# Patient Record
Sex: MALE | Race: BLACK OR AFRICAN AMERICAN | NOT HISPANIC OR LATINO | Employment: OTHER | URBAN - METROPOLITAN AREA
[De-identification: names, ages, dates, MRNs, and addresses within clinical notes are randomized per-mention and may not be internally consistent; named-entity substitution may affect disease eponyms.]

---

## 2018-10-29 ENCOUNTER — HISTORICAL (OUTPATIENT)
Dept: ADMINISTRATIVE | Facility: HOSPITAL | Age: 63
End: 2018-10-29

## 2021-09-19 PROBLEM — I10 ESSENTIAL HYPERTENSION: Status: ACTIVE | Noted: 2021-09-19

## 2021-09-19 PROBLEM — E87.6 HYPOKALEMIA: Status: ACTIVE | Noted: 2021-09-19

## 2021-09-19 PROBLEM — A49.8 CLOSTRIDIUM DIFFICILE INFECTION: Status: ACTIVE | Noted: 2021-09-19

## 2021-09-19 PROBLEM — E78.5 HYPERLIPIDEMIA: Status: ACTIVE | Noted: 2021-09-19

## 2021-09-19 PROBLEM — D64.9 NORMOCYTIC ANEMIA: Status: ACTIVE | Noted: 2021-09-19

## 2021-09-19 PROBLEM — Z86.73 HISTORY OF CVA (CEREBROVASCULAR ACCIDENT): Status: ACTIVE | Noted: 2021-09-19

## 2021-09-20 PROBLEM — N40.0 BENIGN PROSTATIC HYPERPLASIA WITHOUT LOWER URINARY TRACT SYMPTOMS: Status: ACTIVE | Noted: 2021-09-20

## 2024-01-01 ENCOUNTER — HOSPITAL ENCOUNTER (INPATIENT)
Facility: HOSPITAL | Age: 69
LOS: 10 days | DRG: 326 | End: 2024-12-03
Attending: INTERNAL MEDICINE | Admitting: INTERNAL MEDICINE
Payer: MEDICARE

## 2024-01-01 ENCOUNTER — TELEPHONE (OUTPATIENT)
Dept: ENDOSCOPY | Facility: HOSPITAL | Age: 69
End: 2024-01-01
Payer: MEDICARE

## 2024-01-01 VITALS
RESPIRATION RATE: 19 BRPM | HEIGHT: 66 IN | BODY MASS INDEX: 27.49 KG/M2 | WEIGHT: 171.06 LBS | OXYGEN SATURATION: 44 % | DIASTOLIC BLOOD PRESSURE: 65 MMHG | SYSTOLIC BLOOD PRESSURE: 117 MMHG | TEMPERATURE: 98 F

## 2024-01-01 DIAGNOSIS — R00.1 BRADYCARDIA: ICD-10-CM

## 2024-01-01 DIAGNOSIS — R52 PAIN: ICD-10-CM

## 2024-01-01 DIAGNOSIS — Z71.89 ADVANCE CARE PLANNING: ICD-10-CM

## 2024-01-01 DIAGNOSIS — I50.9 HEART FAILURE: ICD-10-CM

## 2024-01-01 DIAGNOSIS — R07.9 CHEST PAIN: ICD-10-CM

## 2024-01-01 DIAGNOSIS — Z51.5 PALLIATIVE CARE ENCOUNTER: ICD-10-CM

## 2024-01-01 DIAGNOSIS — R19.5 LOOSE STOOLS: ICD-10-CM

## 2024-01-01 DIAGNOSIS — Z71.89 GOALS OF CARE, COUNSELING/DISCUSSION: ICD-10-CM

## 2024-01-01 DIAGNOSIS — K22.3 ESOPHAGEAL PERFORATION: Primary | ICD-10-CM

## 2024-01-01 DIAGNOSIS — J96.01 ACUTE HYPOXEMIC RESPIRATORY FAILURE: ICD-10-CM

## 2024-01-01 DIAGNOSIS — R79.89 ELEVATED TROPONIN: ICD-10-CM

## 2024-01-01 LAB
ABO + RH BLD: NORMAL
ALBUMIN SERPL BCP-MCNC: 1 G/DL (ref 3.5–5.2)
ALBUMIN SERPL BCP-MCNC: 1.1 G/DL (ref 3.5–5.2)
ALBUMIN SERPL BCP-MCNC: 1.3 G/DL (ref 3.5–5.2)
ALBUMIN SERPL BCP-MCNC: 1.5 G/DL (ref 3.5–5.2)
ALBUMIN SERPL BCP-MCNC: 1.5 G/DL (ref 3.5–5.2)
ALBUMIN SERPL BCP-MCNC: 1.6 G/DL (ref 3.5–5.2)
ALBUMIN SERPL BCP-MCNC: 1.6 G/DL (ref 3.5–5.2)
ALBUMIN SERPL BCP-MCNC: 1.7 G/DL (ref 3.5–5.2)
ALBUMIN SERPL BCP-MCNC: 1.9 G/DL (ref 3.5–5.2)
ALBUMIN SERPL BCP-MCNC: 2.1 G/DL (ref 3.5–5.2)
ALLENS TEST: ABNORMAL
ALP SERPL-CCNC: 137 U/L (ref 40–150)
ALP SERPL-CCNC: 145 U/L (ref 40–150)
ALP SERPL-CCNC: 146 U/L (ref 40–150)
ALP SERPL-CCNC: 148 U/L (ref 40–150)
ALP SERPL-CCNC: 149 U/L (ref 40–150)
ALP SERPL-CCNC: 153 U/L (ref 40–150)
ALP SERPL-CCNC: 156 U/L (ref 40–150)
ALP SERPL-CCNC: 162 U/L (ref 40–150)
ALP SERPL-CCNC: 169 U/L (ref 40–150)
ALP SERPL-CCNC: 204 U/L (ref 40–150)
ALT SERPL W/O P-5'-P-CCNC: 20 U/L (ref 10–44)
ALT SERPL W/O P-5'-P-CCNC: 23 U/L (ref 10–44)
ALT SERPL W/O P-5'-P-CCNC: 26 U/L (ref 10–44)
ALT SERPL W/O P-5'-P-CCNC: 28 U/L (ref 10–44)
ALT SERPL W/O P-5'-P-CCNC: 28 U/L (ref 10–44)
ALT SERPL W/O P-5'-P-CCNC: 29 U/L (ref 10–44)
ALT SERPL W/O P-5'-P-CCNC: 29 U/L (ref 10–44)
ALT SERPL W/O P-5'-P-CCNC: 37 U/L (ref 10–44)
ALT SERPL W/O P-5'-P-CCNC: 46 U/L (ref 10–44)
ALT SERPL W/O P-5'-P-CCNC: 55 U/L (ref 10–44)
ALT SERPL W/O P-5'-P-CCNC: 66 U/L (ref 10–44)
ANION GAP SERPL CALC-SCNC: 10 MMOL/L (ref 8–16)
ANION GAP SERPL CALC-SCNC: 11 MMOL/L (ref 8–16)
ANION GAP SERPL CALC-SCNC: 12 MMOL/L (ref 8–16)
ANION GAP SERPL CALC-SCNC: 13 MMOL/L (ref 8–16)
ANION GAP SERPL CALC-SCNC: 13 MMOL/L (ref 8–16)
ANION GAP SERPL CALC-SCNC: 14 MMOL/L (ref 8–16)
ANION GAP SERPL CALC-SCNC: 15 MMOL/L (ref 8–16)
ANION GAP SERPL CALC-SCNC: 18 MMOL/L (ref 8–16)
ANION GAP SERPL CALC-SCNC: 5 MMOL/L (ref 8–16)
ANION GAP SERPL CALC-SCNC: 7 MMOL/L (ref 8–16)
ANION GAP SERPL CALC-SCNC: 8 MMOL/L (ref 8–16)
ANION GAP SERPL CALC-SCNC: 9 MMOL/L (ref 8–16)
ANION GAP SERPL CALC-SCNC: 9 MMOL/L (ref 8–16)
ANISOCYTOSIS BLD QL SMEAR: ABNORMAL
ANISOCYTOSIS BLD QL SMEAR: SLIGHT
APPEARANCE FLD: NORMAL
APTT PPP: 51.5 SEC (ref 21–32)
ASCENDING AORTA: 3.31 CM
AST SERPL-CCNC: 106 U/L (ref 10–40)
AST SERPL-CCNC: 146 U/L (ref 10–40)
AST SERPL-CCNC: 171 U/L (ref 10–40)
AST SERPL-CCNC: 60 U/L (ref 10–40)
AST SERPL-CCNC: 61 U/L (ref 10–40)
AST SERPL-CCNC: 61 U/L (ref 10–40)
AST SERPL-CCNC: 62 U/L (ref 10–40)
AST SERPL-CCNC: 68 U/L (ref 10–40)
AST SERPL-CCNC: 71 U/L (ref 10–40)
AST SERPL-CCNC: 71 U/L (ref 10–40)
AST SERPL-CCNC: 79 U/L (ref 10–40)
AST SERPL-CCNC: 80 U/L (ref 10–40)
AST SERPL-CCNC: 82 U/L (ref 10–40)
AV AREA BY CONTINUOUS VTI: 2 CM2
AV INDEX (PROSTH): 0.63
AV LVOT MEAN GRADIENT: 2 MMHG
AV LVOT PEAK GRADIENT: 3 MMHG
AV MEAN GRADIENT: 5.9 MMHG
AV PEAK GRADIENT: 7.8 MMHG
AV VALVE AREA BY VELOCITY RATIO: 1.8 CM²
AV VALVE AREA: 2 CM2
AV VELOCITY RATIO: 0.57
BACTERIA BLD CULT: NORMAL
BACTERIA BLD CULT: NORMAL
BACTERIA SPEC ANAEROBE CULT: NORMAL
BASO STIPL BLD QL SMEAR: ABNORMAL
BASOPHILS # BLD AUTO: 0.02 K/UL (ref 0–0.2)
BASOPHILS # BLD AUTO: 0.03 K/UL (ref 0–0.2)
BASOPHILS # BLD AUTO: 0.05 K/UL (ref 0–0.2)
BASOPHILS # BLD AUTO: 0.05 K/UL (ref 0–0.2)
BASOPHILS # BLD AUTO: 0.06 K/UL (ref 0–0.2)
BASOPHILS # BLD AUTO: 0.06 K/UL (ref 0–0.2)
BASOPHILS # BLD AUTO: 0.07 K/UL (ref 0–0.2)
BASOPHILS # BLD AUTO: 0.08 K/UL (ref 0–0.2)
BASOPHILS # BLD AUTO: 0.08 K/UL (ref 0–0.2)
BASOPHILS # BLD AUTO: 0.09 K/UL (ref 0–0.2)
BASOPHILS # BLD AUTO: 0.13 K/UL (ref 0–0.2)
BASOPHILS # BLD AUTO: 0.13 K/UL (ref 0–0.2)
BASOPHILS # BLD AUTO: 0.15 K/UL (ref 0–0.2)
BASOPHILS # BLD AUTO: ABNORMAL K/UL (ref 0–0.2)
BASOPHILS NFR BLD: 0 % (ref 0–1.9)
BASOPHILS NFR BLD: 0.1 % (ref 0–1.9)
BASOPHILS NFR BLD: 0.1 % (ref 0–1.9)
BASOPHILS NFR BLD: 0.2 % (ref 0–1.9)
BASOPHILS NFR BLD: 0.3 % (ref 0–1.9)
BASOPHILS NFR BLD: 0.4 % (ref 0–1.9)
BASOPHILS NFR BLD: 0.4 % (ref 0–1.9)
BASOPHILS NFR BLD: 0.5 % (ref 0–1.9)
BASOPHILS NFR BLD: 0.6 % (ref 0–1.9)
BILIRUB SERPL-MCNC: 1.8 MG/DL (ref 0.1–1)
BILIRUB SERPL-MCNC: 1.9 MG/DL (ref 0.1–1)
BILIRUB SERPL-MCNC: 1.9 MG/DL (ref 0.1–1)
BILIRUB SERPL-MCNC: 2.2 MG/DL (ref 0.1–1)
BILIRUB SERPL-MCNC: 2.3 MG/DL (ref 0.1–1)
BILIRUB SERPL-MCNC: 2.5 MG/DL (ref 0.1–1)
BILIRUB SERPL-MCNC: 2.6 MG/DL (ref 0.1–1)
BILIRUB SERPL-MCNC: 2.6 MG/DL (ref 0.1–1)
BILIRUB SERPL-MCNC: 2.7 MG/DL (ref 0.1–1)
BILIRUB SERPL-MCNC: 2.8 MG/DL (ref 0.1–1)
BILIRUB SERPL-MCNC: 3.3 MG/DL (ref 0.1–1)
BLD GP AB SCN CELLS X3 SERPL QL: NORMAL
BLD PROD TYP BPU: NORMAL
BLD PROD TYP BPU: NORMAL
BLOOD UNIT EXPIRATION DATE: NORMAL
BLOOD UNIT EXPIRATION DATE: NORMAL
BLOOD UNIT TYPE CODE: 6200
BLOOD UNIT TYPE CODE: 6200
BLOOD UNIT TYPE: NORMAL
BLOOD UNIT TYPE: NORMAL
BNP SERPL-MCNC: 1484 PG/ML (ref 0–99)
BODY FLD TYPE: NORMAL
BODY FLUID COMMENTS: NORMAL
BSA FOR ECHO PROCEDURE: 1.9 M2
BUN SERPL-MCNC: 21 MG/DL (ref 8–23)
BUN SERPL-MCNC: 26 MG/DL (ref 8–23)
BUN SERPL-MCNC: 27 MG/DL (ref 8–23)
BUN SERPL-MCNC: 31 MG/DL (ref 8–23)
BUN SERPL-MCNC: 32 MG/DL (ref 8–23)
BUN SERPL-MCNC: 39 MG/DL (ref 8–23)
BUN SERPL-MCNC: 46 MG/DL (ref 8–23)
BUN SERPL-MCNC: 59 MG/DL (ref 8–23)
BUN SERPL-MCNC: 61 MG/DL (ref 8–23)
BUN SERPL-MCNC: 61 MG/DL (ref 8–23)
BUN SERPL-MCNC: 62 MG/DL (ref 8–23)
BUN SERPL-MCNC: 63 MG/DL (ref 8–23)
BUN SERPL-MCNC: 64 MG/DL (ref 8–23)
BUN SERPL-MCNC: 65 MG/DL (ref 8–23)
BUN SERPL-MCNC: 66 MG/DL (ref 8–23)
BUN SERPL-MCNC: 70 MG/DL (ref 8–23)
BUN SERPL-MCNC: 76 MG/DL (ref 8–23)
BUN SERPL-MCNC: 78 MG/DL (ref 8–23)
BUN SERPL-MCNC: 79 MG/DL (ref 8–23)
BUN SERPL-MCNC: 80 MG/DL (ref 8–23)
BUN SERPL-MCNC: 80 MG/DL (ref 8–23)
BUN SERPL-MCNC: 82 MG/DL (ref 8–23)
BUN SERPL-MCNC: 83 MG/DL (ref 8–23)
BURR CELLS BLD QL SMEAR: ABNORMAL
CA-I BLDV-SCNC: 1.15 MMOL/L (ref 1.06–1.42)
CALCIUM SERPL-MCNC: 7.7 MG/DL (ref 8.7–10.5)
CALCIUM SERPL-MCNC: 7.8 MG/DL (ref 8.7–10.5)
CALCIUM SERPL-MCNC: 7.9 MG/DL (ref 8.7–10.5)
CALCIUM SERPL-MCNC: 8.1 MG/DL (ref 8.7–10.5)
CALCIUM SERPL-MCNC: 8.4 MG/DL (ref 8.7–10.5)
CALCIUM SERPL-MCNC: 8.5 MG/DL (ref 8.7–10.5)
CALCIUM SERPL-MCNC: 8.5 MG/DL (ref 8.7–10.5)
CALCIUM SERPL-MCNC: 8.6 MG/DL (ref 8.7–10.5)
CALCIUM SERPL-MCNC: 8.7 MG/DL (ref 8.7–10.5)
CALCIUM SERPL-MCNC: 8.7 MG/DL (ref 8.7–10.5)
CALCIUM SERPL-MCNC: 8.8 MG/DL (ref 8.7–10.5)
CALCIUM SERPL-MCNC: 8.9 MG/DL (ref 8.7–10.5)
CALCIUM SERPL-MCNC: 9.1 MG/DL (ref 8.7–10.5)
CALCIUM SERPL-MCNC: 9.2 MG/DL (ref 8.7–10.5)
CHLORIDE SERPL-SCNC: 101 MMOL/L (ref 95–110)
CHLORIDE SERPL-SCNC: 102 MMOL/L (ref 95–110)
CHLORIDE SERPL-SCNC: 102 MMOL/L (ref 95–110)
CHLORIDE SERPL-SCNC: 103 MMOL/L (ref 95–110)
CHLORIDE SERPL-SCNC: 104 MMOL/L (ref 95–110)
CHLORIDE SERPL-SCNC: 104 MMOL/L (ref 95–110)
CHLORIDE SERPL-SCNC: 105 MMOL/L (ref 95–110)
CHLORIDE SERPL-SCNC: 106 MMOL/L (ref 95–110)
CHLORIDE SERPL-SCNC: 108 MMOL/L (ref 95–110)
CHLORIDE SERPL-SCNC: 113 MMOL/L (ref 95–110)
CHLORIDE SERPL-SCNC: 113 MMOL/L (ref 95–110)
CHLORIDE SERPL-SCNC: 115 MMOL/L (ref 95–110)
CHLORIDE SERPL-SCNC: 115 MMOL/L (ref 95–110)
CK SERPL-CCNC: 44 U/L (ref 20–200)
CO2 SERPL-SCNC: 20 MMOL/L (ref 23–29)
CO2 SERPL-SCNC: 20 MMOL/L (ref 23–29)
CO2 SERPL-SCNC: 21 MMOL/L (ref 23–29)
CO2 SERPL-SCNC: 21 MMOL/L (ref 23–29)
CO2 SERPL-SCNC: 22 MMOL/L (ref 23–29)
CO2 SERPL-SCNC: 23 MMOL/L (ref 23–29)
CO2 SERPL-SCNC: 23 MMOL/L (ref 23–29)
CO2 SERPL-SCNC: 24 MMOL/L (ref 23–29)
CO2 SERPL-SCNC: 25 MMOL/L (ref 23–29)
CO2 SERPL-SCNC: 26 MMOL/L (ref 23–29)
CO2 SERPL-SCNC: 29 MMOL/L (ref 23–29)
CO2 SERPL-SCNC: 30 MMOL/L (ref 23–29)
CO2 SERPL-SCNC: 30 MMOL/L (ref 23–29)
CO2 SERPL-SCNC: 34 MMOL/L (ref 23–29)
CODING SYSTEM: NORMAL
CODING SYSTEM: NORMAL
COLOR FLD: NORMAL
CREAT SERPL-MCNC: 1.5 MG/DL (ref 0.5–1.4)
CREAT SERPL-MCNC: 1.6 MG/DL (ref 0.5–1.4)
CREAT SERPL-MCNC: 1.7 MG/DL (ref 0.5–1.4)
CREAT SERPL-MCNC: 1.7 MG/DL (ref 0.5–1.4)
CREAT SERPL-MCNC: 1.8 MG/DL (ref 0.5–1.4)
CREAT SERPL-MCNC: 1.9 MG/DL (ref 0.5–1.4)
CREAT SERPL-MCNC: 2 MG/DL (ref 0.5–1.4)
CREAT SERPL-MCNC: 2 MG/DL (ref 0.5–1.4)
CREAT SERPL-MCNC: 2.1 MG/DL (ref 0.5–1.4)
CREAT SERPL-MCNC: 2.2 MG/DL (ref 0.5–1.4)
CREAT SERPL-MCNC: 2.3 MG/DL (ref 0.5–1.4)
CREAT SERPL-MCNC: 2.4 MG/DL (ref 0.5–1.4)
CREAT SERPL-MCNC: 2.4 MG/DL (ref 0.5–1.4)
CREAT SERPL-MCNC: 2.5 MG/DL (ref 0.5–1.4)
CROSSMATCH INTERPRETATION: NORMAL
CROSSMATCH INTERPRETATION: NORMAL
CV ECHO LV RWT: 0.48 CM
DACRYOCYTES BLD QL SMEAR: ABNORMAL
DELSYS: ABNORMAL
DIFFERENTIAL METHOD BLD: ABNORMAL
DISPENSE STATUS: NORMAL
DISPENSE STATUS: NORMAL
DOHLE BOD BLD QL SMEAR: PRESENT
DOP CALC AO PEAK VEL: 1.4 M/S
DOP CALC AO VTI: 21 CM
DOP CALC LVOT AREA: 3.1 CM2
DOP CALC LVOT DIAMETER: 2 CM
DOP CALC LVOT PEAK VEL: 0.8 M/S
DOP CALC LVOT STROKE VOLUME: 41.8 CM3
DOP CALCLVOT PEAK VEL VTI: 13.3 CM
E WAVE DECELERATION TIME: 190.68 MS
E WAVE DECELERATION TIME: 190.83 MS
E/A RATIO: 0.87
E/E' RATIO: 11.29 M/S
ECHO EF ESTIMATED: 51 %
ECHO LV POSTERIOR WALL: 1 CM (ref 0.6–1.1)
EJECTION FRACTION: 53 %
EOSINOPHIL # BLD AUTO: 0 K/UL (ref 0–0.5)
EOSINOPHIL # BLD AUTO: 0.1 K/UL (ref 0–0.5)
EOSINOPHIL # BLD AUTO: 0.2 K/UL (ref 0–0.5)
EOSINOPHIL # BLD AUTO: 0.2 K/UL (ref 0–0.5)
EOSINOPHIL # BLD AUTO: 0.3 K/UL (ref 0–0.5)
EOSINOPHIL # BLD AUTO: 0.3 K/UL (ref 0–0.5)
EOSINOPHIL # BLD AUTO: 0.4 K/UL (ref 0–0.5)
EOSINOPHIL # BLD AUTO: 0.4 K/UL (ref 0–0.5)
EOSINOPHIL # BLD AUTO: ABNORMAL K/UL (ref 0–0.5)
EOSINOPHIL NFR BLD: 0 % (ref 0–8)
EOSINOPHIL NFR BLD: 0.1 % (ref 0–8)
EOSINOPHIL NFR BLD: 0.2 % (ref 0–8)
EOSINOPHIL NFR BLD: 0.4 % (ref 0–8)
EOSINOPHIL NFR BLD: 0.5 % (ref 0–8)
EOSINOPHIL NFR BLD: 0.7 % (ref 0–8)
EOSINOPHIL NFR BLD: 0.9 % (ref 0–8)
EOSINOPHIL NFR BLD: 0.9 % (ref 0–8)
EOSINOPHIL NFR BLD: 1 % (ref 0–8)
EOSINOPHIL NFR BLD: 1.3 % (ref 0–8)
EOSINOPHIL NFR BLD: 1.4 % (ref 0–8)
EOSINOPHIL NFR BLD: 2 % (ref 0–8)
ERYTHROCYTE [DISTWIDTH] IN BLOOD BY AUTOMATED COUNT: 19.9 % (ref 11.5–14.5)
ERYTHROCYTE [DISTWIDTH] IN BLOOD BY AUTOMATED COUNT: 20.9 % (ref 11.5–14.5)
ERYTHROCYTE [DISTWIDTH] IN BLOOD BY AUTOMATED COUNT: 21 % (ref 11.5–14.5)
ERYTHROCYTE [DISTWIDTH] IN BLOOD BY AUTOMATED COUNT: 21.2 % (ref 11.5–14.5)
ERYTHROCYTE [DISTWIDTH] IN BLOOD BY AUTOMATED COUNT: 21.3 % (ref 11.5–14.5)
ERYTHROCYTE [DISTWIDTH] IN BLOOD BY AUTOMATED COUNT: 21.5 % (ref 11.5–14.5)
ERYTHROCYTE [DISTWIDTH] IN BLOOD BY AUTOMATED COUNT: 21.6 % (ref 11.5–14.5)
ERYTHROCYTE [DISTWIDTH] IN BLOOD BY AUTOMATED COUNT: 21.7 % (ref 11.5–14.5)
ERYTHROCYTE [DISTWIDTH] IN BLOOD BY AUTOMATED COUNT: 21.9 % (ref 11.5–14.5)
ERYTHROCYTE [DISTWIDTH] IN BLOOD BY AUTOMATED COUNT: 22 % (ref 11.5–14.5)
ERYTHROCYTE [DISTWIDTH] IN BLOOD BY AUTOMATED COUNT: 22.3 % (ref 11.5–14.5)
ERYTHROCYTE [DISTWIDTH] IN BLOOD BY AUTOMATED COUNT: 22.3 % (ref 11.5–14.5)
ERYTHROCYTE [DISTWIDTH] IN BLOOD BY AUTOMATED COUNT: 22.7 % (ref 11.5–14.5)
ERYTHROCYTE [DISTWIDTH] IN BLOOD BY AUTOMATED COUNT: 22.8 % (ref 11.5–14.5)
ERYTHROCYTE [SEDIMENTATION RATE] IN BLOOD BY WESTERGREN METHOD: 18 MM/H
ERYTHROCYTE [SEDIMENTATION RATE] IN BLOOD BY WESTERGREN METHOD: 18 MM/H
ERYTHROCYTE [SEDIMENTATION RATE] IN BLOOD BY WESTERGREN METHOD: 22 MM/H
ERYTHROCYTE [SEDIMENTATION RATE] IN BLOOD BY WESTERGREN METHOD: 32 MM/H
EST. GFR  (NO RACE VARIABLE): 27.1 ML/MIN/1.73 M^2
EST. GFR  (NO RACE VARIABLE): 28.5 ML/MIN/1.73 M^2
EST. GFR  (NO RACE VARIABLE): 28.5 ML/MIN/1.73 M^2
EST. GFR  (NO RACE VARIABLE): 30 ML/MIN/1.73 M^2
EST. GFR  (NO RACE VARIABLE): 31.6 ML/MIN/1.73 M^2
EST. GFR  (NO RACE VARIABLE): 33.4 ML/MIN/1.73 M^2
EST. GFR  (NO RACE VARIABLE): 35.5 ML/MIN/1.73 M^2
EST. GFR  (NO RACE VARIABLE): 35.5 ML/MIN/1.73 M^2
EST. GFR  (NO RACE VARIABLE): 37.7 ML/MIN/1.73 M^2
EST. GFR  (NO RACE VARIABLE): 40.2 ML/MIN/1.73 M^2
EST. GFR  (NO RACE VARIABLE): 43.1 ML/MIN/1.73 M^2
EST. GFR  (NO RACE VARIABLE): 43.1 ML/MIN/1.73 M^2
EST. GFR  (NO RACE VARIABLE): 46.4 ML/MIN/1.73 M^2
EST. GFR  (NO RACE VARIABLE): 50.1 ML/MIN/1.73 M^2
FIO2: 30
FIO2: 40
FIO2: 70
FIO2: 70
FIO2: 80
FRACTIONAL SHORTENING: 26.2 % (ref 28–44)
FUNGUS SPEC CULT: ABNORMAL
GIANT PLATELETS BLD QL SMEAR: PRESENT
GLUCOSE SERPL-MCNC: 100 MG/DL (ref 70–110)
GLUCOSE SERPL-MCNC: 108 MG/DL (ref 70–110)
GLUCOSE SERPL-MCNC: 109 MG/DL (ref 70–110)
GLUCOSE SERPL-MCNC: 110 MG/DL (ref 70–110)
GLUCOSE SERPL-MCNC: 111 MG/DL (ref 70–110)
GLUCOSE SERPL-MCNC: 112 MG/DL (ref 70–110)
GLUCOSE SERPL-MCNC: 119 MG/DL (ref 70–110)
GLUCOSE SERPL-MCNC: 119 MG/DL (ref 70–110)
GLUCOSE SERPL-MCNC: 126 MG/DL (ref 70–110)
GLUCOSE SERPL-MCNC: 127 MG/DL (ref 70–110)
GLUCOSE SERPL-MCNC: 128 MG/DL (ref 70–110)
GLUCOSE SERPL-MCNC: 128 MG/DL (ref 70–110)
GLUCOSE SERPL-MCNC: 130 MG/DL (ref 70–110)
GLUCOSE SERPL-MCNC: 134 MG/DL (ref 70–110)
GLUCOSE SERPL-MCNC: 135 MG/DL (ref 70–110)
GLUCOSE SERPL-MCNC: 148 MG/DL (ref 70–110)
GLUCOSE SERPL-MCNC: 148 MG/DL (ref 70–110)
GLUCOSE SERPL-MCNC: 149 MG/DL (ref 70–110)
GLUCOSE SERPL-MCNC: 149 MG/DL (ref 70–110)
GLUCOSE SERPL-MCNC: 156 MG/DL (ref 70–110)
GLUCOSE SERPL-MCNC: 174 MG/DL (ref 70–110)
GLUCOSE SERPL-MCNC: 181 MG/DL (ref 70–110)
GLUCOSE SERPL-MCNC: 187 MG/DL (ref 70–110)
GLUCOSE SERPL-MCNC: 192 MG/DL (ref 70–110)
GLUCOSE SERPL-MCNC: 81 MG/DL (ref 70–110)
GLUCOSE SERPL-MCNC: 83 MG/DL (ref 70–110)
GLUCOSE SERPL-MCNC: 84 MG/DL (ref 70–110)
GLUCOSE SERPL-MCNC: 84 MG/DL (ref 70–110)
GRAM STN SPEC: NORMAL
GRAM STN SPEC: NORMAL
HCO3 UR-SCNC: 25.3 MMOL/L (ref 24–28)
HCO3 UR-SCNC: 26.3 MMOL/L (ref 24–28)
HCO3 UR-SCNC: 27 MMOL/L (ref 24–28)
HCO3 UR-SCNC: 27.1 MMOL/L (ref 24–28)
HCO3 UR-SCNC: 27.5 MMOL/L (ref 24–28)
HCO3 UR-SCNC: 27.7 MMOL/L (ref 24–28)
HCO3 UR-SCNC: 28.4 MMOL/L (ref 24–28)
HCO3 UR-SCNC: 30.4 MMOL/L (ref 24–28)
HCO3 UR-SCNC: 35.5 MMOL/L (ref 24–28)
HCO3 UR-SCNC: 35.9 MMOL/L (ref 24–28)
HCO3 UR-SCNC: 36.3 MMOL/L (ref 24–28)
HCO3 UR-SCNC: 38.2 MMOL/L (ref 24–28)
HCT VFR BLD AUTO: 19.7 % (ref 40–54)
HCT VFR BLD AUTO: 20.4 % (ref 40–54)
HCT VFR BLD AUTO: 21.4 % (ref 40–54)
HCT VFR BLD AUTO: 23 % (ref 40–54)
HCT VFR BLD AUTO: 23.6 % (ref 40–54)
HCT VFR BLD AUTO: 23.9 % (ref 40–54)
HCT VFR BLD AUTO: 24 % (ref 40–54)
HCT VFR BLD AUTO: 24.3 % (ref 40–54)
HCT VFR BLD AUTO: 24.4 % (ref 40–54)
HCT VFR BLD AUTO: 24.7 % (ref 40–54)
HCT VFR BLD AUTO: 25.5 % (ref 40–54)
HCT VFR BLD AUTO: 25.6 % (ref 40–54)
HCT VFR BLD AUTO: 26.4 % (ref 40–54)
HCT VFR BLD AUTO: 26.5 % (ref 40–54)
HCT VFR BLD CALC: 20 %PCV (ref 36–54)
HCT VFR BLD CALC: 23 %PCV (ref 36–54)
HCT VFR BLD CALC: 24 %PCV (ref 36–54)
HCT VFR BLD CALC: 25 %PCV (ref 36–54)
HGB BLD-MCNC: 6.3 G/DL (ref 14–18)
HGB BLD-MCNC: 6.8 G/DL (ref 14–18)
HGB BLD-MCNC: 7 G/DL (ref 14–18)
HGB BLD-MCNC: 7.4 G/DL (ref 14–18)
HGB BLD-MCNC: 7.5 G/DL (ref 14–18)
HGB BLD-MCNC: 7.7 G/DL (ref 14–18)
HGB BLD-MCNC: 7.7 G/DL (ref 14–18)
HGB BLD-MCNC: 7.8 G/DL (ref 14–18)
HGB BLD-MCNC: 7.8 G/DL (ref 14–18)
HGB BLD-MCNC: 8 G/DL (ref 14–18)
HGB BLD-MCNC: 8.2 G/DL (ref 14–18)
HGB BLD-MCNC: 8.3 G/DL (ref 14–18)
HGB BLD-MCNC: 8.3 G/DL (ref 14–18)
HGB BLD-MCNC: 8.4 G/DL (ref 14–18)
HYPOCHROMIA BLD QL SMEAR: ABNORMAL
IMM GRANULOCYTES # BLD AUTO: 0.18 K/UL (ref 0–0.04)
IMM GRANULOCYTES # BLD AUTO: 0.19 K/UL (ref 0–0.04)
IMM GRANULOCYTES # BLD AUTO: 0.19 K/UL (ref 0–0.04)
IMM GRANULOCYTES # BLD AUTO: 0.21 K/UL (ref 0–0.04)
IMM GRANULOCYTES # BLD AUTO: 0.23 K/UL (ref 0–0.04)
IMM GRANULOCYTES # BLD AUTO: 0.31 K/UL (ref 0–0.04)
IMM GRANULOCYTES # BLD AUTO: 0.31 K/UL (ref 0–0.04)
IMM GRANULOCYTES # BLD AUTO: 0.37 K/UL (ref 0–0.04)
IMM GRANULOCYTES # BLD AUTO: 0.42 K/UL (ref 0–0.04)
IMM GRANULOCYTES # BLD AUTO: 0.46 K/UL (ref 0–0.04)
IMM GRANULOCYTES # BLD AUTO: 0.49 K/UL (ref 0–0.04)
IMM GRANULOCYTES # BLD AUTO: 0.88 K/UL (ref 0–0.04)
IMM GRANULOCYTES # BLD AUTO: 0.95 K/UL (ref 0–0.04)
IMM GRANULOCYTES # BLD AUTO: ABNORMAL K/UL (ref 0–0.04)
IMM GRANULOCYTES NFR BLD AUTO: 0.9 % (ref 0–0.5)
IMM GRANULOCYTES NFR BLD AUTO: 1 % (ref 0–0.5)
IMM GRANULOCYTES NFR BLD AUTO: 1 % (ref 0–0.5)
IMM GRANULOCYTES NFR BLD AUTO: 1.1 % (ref 0–0.5)
IMM GRANULOCYTES NFR BLD AUTO: 1.2 % (ref 0–0.5)
IMM GRANULOCYTES NFR BLD AUTO: 1.4 % (ref 0–0.5)
IMM GRANULOCYTES NFR BLD AUTO: 1.4 % (ref 0–0.5)
IMM GRANULOCYTES NFR BLD AUTO: 1.8 % (ref 0–0.5)
IMM GRANULOCYTES NFR BLD AUTO: 1.9 % (ref 0–0.5)
IMM GRANULOCYTES NFR BLD AUTO: 2.1 % (ref 0–0.5)
IMM GRANULOCYTES NFR BLD AUTO: 2.3 % (ref 0–0.5)
IMM GRANULOCYTES NFR BLD AUTO: 2.7 % (ref 0–0.5)
IMM GRANULOCYTES NFR BLD AUTO: 3.5 % (ref 0–0.5)
IMM GRANULOCYTES NFR BLD AUTO: ABNORMAL % (ref 0–0.5)
INR PPP: 1.2 (ref 0.8–1.2)
INR PPP: 1.4 (ref 0.8–1.2)
INTERVENTRICULAR SEPTUM: 1 CM (ref 0.6–1.1)
IP: 20
IP: 25
IP: 26
IP: 30
IT: 0.7
IVC DIAMETER: 1.1 CM
LA MAJOR: 5.16 CM
LA MINOR: 4.79 CM
LA WIDTH: 4.26 CM
LACTATE SERPL-SCNC: 0.8 MMOL/L (ref 0.5–2.2)
LACTATE SERPL-SCNC: 0.9 MMOL/L (ref 0.5–2.2)
LACTATE SERPL-SCNC: 1 MMOL/L (ref 0.5–2.2)
LEFT ATRIUM SIZE: 2.75 CM
LEFT ATRIUM VOLUME INDEX MOD: 17.6 ML/M2
LEFT ATRIUM VOLUME INDEX: 26.5 ML/M2
LEFT ATRIUM VOLUME MOD: 33 ML
LEFT ATRIUM VOLUME: 49.47 CM3
LEFT INTERNAL DIMENSION IN SYSTOLE: 3.1 CM (ref 2.1–4)
LEFT VENTRICLE DIASTOLIC VOLUME INDEX: 41.98 ML/M2
LEFT VENTRICLE DIASTOLIC VOLUME: 78.5 ML
LEFT VENTRICLE MASS INDEX: 73.4 G/M2
LEFT VENTRICLE SYSTOLIC VOLUME INDEX: 20.6 ML/M2
LEFT VENTRICLE SYSTOLIC VOLUME: 38.56 ML
LEFT VENTRICULAR INTERNAL DIMENSION IN DIASTOLE: 4.2 CM (ref 3.5–6)
LEFT VENTRICULAR MASS: 137.2 G
LIPASE SERPL-CCNC: 44 U/L (ref 4–60)
LV LATERAL E/E' RATIO: 8.78 M/S
LV SEPTAL E/E' RATIO: 15.8 M/S
LYMPHOCYTES # BLD AUTO: 0.7 K/UL (ref 1–4.8)
LYMPHOCYTES # BLD AUTO: 0.7 K/UL (ref 1–4.8)
LYMPHOCYTES # BLD AUTO: 0.8 K/UL (ref 1–4.8)
LYMPHOCYTES # BLD AUTO: 0.9 K/UL (ref 1–4.8)
LYMPHOCYTES # BLD AUTO: 1 K/UL (ref 1–4.8)
LYMPHOCYTES # BLD AUTO: 1.1 K/UL (ref 1–4.8)
LYMPHOCYTES # BLD AUTO: 1.1 K/UL (ref 1–4.8)
LYMPHOCYTES # BLD AUTO: ABNORMAL K/UL (ref 1–4.8)
LYMPHOCYTES NFR BLD: 3 % (ref 18–48)
LYMPHOCYTES NFR BLD: 3 % (ref 18–48)
LYMPHOCYTES NFR BLD: 3.2 % (ref 18–48)
LYMPHOCYTES NFR BLD: 3.2 % (ref 18–48)
LYMPHOCYTES NFR BLD: 3.3 % (ref 18–48)
LYMPHOCYTES NFR BLD: 3.6 % (ref 18–48)
LYMPHOCYTES NFR BLD: 3.6 % (ref 18–48)
LYMPHOCYTES NFR BLD: 3.7 % (ref 18–48)
LYMPHOCYTES NFR BLD: 4.3 % (ref 18–48)
LYMPHOCYTES NFR BLD: 4.5 % (ref 18–48)
LYMPHOCYTES NFR BLD: 4.6 % (ref 18–48)
LYMPHOCYTES NFR BLD: 4.8 % (ref 18–48)
LYMPHOCYTES NFR BLD: 5.2 % (ref 18–48)
LYMPHOCYTES NFR BLD: 5.5 % (ref 18–48)
LYMPHOCYTES NFR FLD MANUAL: 1 %
MAGNESIUM SERPL-MCNC: 2 MG/DL (ref 1.6–2.6)
MAGNESIUM SERPL-MCNC: 2.1 MG/DL (ref 1.6–2.6)
MAGNESIUM SERPL-MCNC: 2.2 MG/DL (ref 1.6–2.6)
MAGNESIUM SERPL-MCNC: 2.3 MG/DL (ref 1.6–2.6)
MAP: 16
MAP: 18
MAP: 20
MAP: 20
MCH RBC QN AUTO: 26.1 PG (ref 27–31)
MCH RBC QN AUTO: 26.2 PG (ref 27–31)
MCH RBC QN AUTO: 26.3 PG (ref 27–31)
MCH RBC QN AUTO: 26.4 PG (ref 27–31)
MCH RBC QN AUTO: 26.5 PG (ref 27–31)
MCH RBC QN AUTO: 26.6 PG (ref 27–31)
MCH RBC QN AUTO: 26.6 PG (ref 27–31)
MCH RBC QN AUTO: 26.7 PG (ref 27–31)
MCH RBC QN AUTO: 26.7 PG (ref 27–31)
MCH RBC QN AUTO: 27 PG (ref 27–31)
MCH RBC QN AUTO: 27 PG (ref 27–31)
MCHC RBC AUTO-ENTMCNC: 31.3 G/DL (ref 32–36)
MCHC RBC AUTO-ENTMCNC: 31.4 G/DL (ref 32–36)
MCHC RBC AUTO-ENTMCNC: 31.4 G/DL (ref 32–36)
MCHC RBC AUTO-ENTMCNC: 31.6 G/DL (ref 32–36)
MCHC RBC AUTO-ENTMCNC: 31.7 G/DL (ref 32–36)
MCHC RBC AUTO-ENTMCNC: 32 G/DL (ref 32–36)
MCHC RBC AUTO-ENTMCNC: 32.1 G/DL (ref 32–36)
MCHC RBC AUTO-ENTMCNC: 32.2 G/DL (ref 32–36)
MCHC RBC AUTO-ENTMCNC: 32.2 G/DL (ref 32–36)
MCHC RBC AUTO-ENTMCNC: 32.4 G/DL (ref 32–36)
MCHC RBC AUTO-ENTMCNC: 32.6 G/DL (ref 32–36)
MCHC RBC AUTO-ENTMCNC: 32.7 G/DL (ref 32–36)
MCHC RBC AUTO-ENTMCNC: 33.3 G/DL (ref 32–36)
MCHC RBC AUTO-ENTMCNC: 33.6 G/DL (ref 32–36)
MCV RBC AUTO: 79 FL (ref 82–98)
MCV RBC AUTO: 80 FL (ref 82–98)
MCV RBC AUTO: 81 FL (ref 82–98)
MCV RBC AUTO: 81 FL (ref 82–98)
MCV RBC AUTO: 82 FL (ref 82–98)
MCV RBC AUTO: 83 FL (ref 82–98)
MCV RBC AUTO: 84 FL (ref 82–98)
MCV RBC AUTO: 84 FL (ref 82–98)
MCV RBC AUTO: 85 FL (ref 82–98)
METAMYELOCYTES NFR BLD MANUAL: 1 %
MODE: ABNORMAL
MONOCYTES # BLD AUTO: 0.7 K/UL (ref 0.3–1)
MONOCYTES # BLD AUTO: 0.9 K/UL (ref 0.3–1)
MONOCYTES # BLD AUTO: 1.1 K/UL (ref 0.3–1)
MONOCYTES # BLD AUTO: 1.3 K/UL (ref 0.3–1)
MONOCYTES # BLD AUTO: 1.7 K/UL (ref 0.3–1)
MONOCYTES # BLD AUTO: 1.8 K/UL (ref 0.3–1)
MONOCYTES # BLD AUTO: 1.9 K/UL (ref 0.3–1)
MONOCYTES # BLD AUTO: 2 K/UL (ref 0.3–1)
MONOCYTES # BLD AUTO: 2.1 K/UL (ref 0.3–1)
MONOCYTES # BLD AUTO: 2.1 K/UL (ref 0.3–1)
MONOCYTES # BLD AUTO: 2.5 K/UL (ref 0.3–1)
MONOCYTES # BLD AUTO: ABNORMAL K/UL (ref 0.3–1)
MONOCYTES NFR BLD: 10.5 % (ref 4–15)
MONOCYTES NFR BLD: 11.1 % (ref 4–15)
MONOCYTES NFR BLD: 11.9 % (ref 4–15)
MONOCYTES NFR BLD: 3.1 % (ref 4–15)
MONOCYTES NFR BLD: 4.6 % (ref 4–15)
MONOCYTES NFR BLD: 6.3 % (ref 4–15)
MONOCYTES NFR BLD: 6.4 % (ref 4–15)
MONOCYTES NFR BLD: 6.7 % (ref 4–15)
MONOCYTES NFR BLD: 7 % (ref 4–15)
MONOCYTES NFR BLD: 7 % (ref 4–15)
MONOCYTES NFR BLD: 7.6 % (ref 4–15)
MONOCYTES NFR BLD: 8.4 % (ref 4–15)
MONOCYTES NFR BLD: 8.5 % (ref 4–15)
MONOCYTES NFR BLD: 8.6 % (ref 4–15)
MONOS+MACROS NFR FLD MANUAL: 5 %
MV PEAK A VEL: 0.91 M/S
MV PEAK E VEL: 0.79 M/S
MYELOCYTES NFR BLD MANUAL: 1 %
NEUTROPHILS # BLD AUTO: 12.5 K/UL (ref 1.8–7.7)
NEUTROPHILS # BLD AUTO: 14.2 K/UL (ref 1.8–7.7)
NEUTROPHILS # BLD AUTO: 14.9 K/UL (ref 1.8–7.7)
NEUTROPHILS # BLD AUTO: 15.2 K/UL (ref 1.8–7.7)
NEUTROPHILS # BLD AUTO: 17 K/UL (ref 1.8–7.7)
NEUTROPHILS # BLD AUTO: 17.4 K/UL (ref 1.8–7.7)
NEUTROPHILS # BLD AUTO: 18.1 K/UL (ref 1.8–7.7)
NEUTROPHILS # BLD AUTO: 19.6 K/UL (ref 1.8–7.7)
NEUTROPHILS # BLD AUTO: 20 K/UL (ref 1.8–7.7)
NEUTROPHILS # BLD AUTO: 22.6 K/UL (ref 1.8–7.7)
NEUTROPHILS # BLD AUTO: 22.9 K/UL (ref 1.8–7.7)
NEUTROPHILS # BLD AUTO: 24.5 K/UL (ref 1.8–7.7)
NEUTROPHILS # BLD AUTO: 28.5 K/UL (ref 1.8–7.7)
NEUTROPHILS # BLD AUTO: ABNORMAL K/UL (ref 1.8–7.7)
NEUTROPHILS NFR BLD: 81.9 % (ref 38–73)
NEUTROPHILS NFR BLD: 82.1 % (ref 38–73)
NEUTROPHILS NFR BLD: 82.4 % (ref 38–73)
NEUTROPHILS NFR BLD: 82.7 % (ref 38–73)
NEUTROPHILS NFR BLD: 83.3 % (ref 38–73)
NEUTROPHILS NFR BLD: 83.4 % (ref 38–73)
NEUTROPHILS NFR BLD: 85 % (ref 38–73)
NEUTROPHILS NFR BLD: 86.3 % (ref 38–73)
NEUTROPHILS NFR BLD: 87 % (ref 38–73)
NEUTROPHILS NFR BLD: 87.2 % (ref 38–73)
NEUTROPHILS NFR BLD: 87.5 % (ref 38–73)
NEUTROPHILS NFR BLD: 88.2 % (ref 38–73)
NEUTROPHILS NFR BLD: 89.3 % (ref 38–73)
NEUTROPHILS NFR BLD: 91.3 % (ref 38–73)
NEUTROPHILS NFR FLD MANUAL: 94 %
NEUTS BAND NFR BLD MANUAL: 1 %
NRBC BLD-RTO: 0 /100 WBC
OHS CV RV/LV RATIO: 0.76 CM
OHS LV EJECTION FRACTION SIMPSONS BIPLANE MOD: 54 %
OHS QRS DURATION: 70 MS
OHS QRS DURATION: 80 MS
OHS QRS DURATION: 96 MS
OHS QRS DURATION: 98 MS
OHS QRS DURATION: 98 MS
OHS QTC CALCULATION: 460 MS
OHS QTC CALCULATION: 469 MS
OHS QTC CALCULATION: 469 MS
OHS QTC CALCULATION: 477 MS
OHS QTC CALCULATION: 510 MS
OVALOCYTES BLD QL SMEAR: ABNORMAL
PCO2 BLDA: 39.4 MMHG (ref 35–45)
PCO2 BLDA: 41.8 MMHG (ref 35–45)
PCO2 BLDA: 42.8 MMHG (ref 35–45)
PCO2 BLDA: 48.1 MMHG (ref 35–45)
PCO2 BLDA: 48.9 MMHG (ref 35–45)
PCO2 BLDA: 49.8 MMHG (ref 35–45)
PCO2 BLDA: 52.4 MMHG (ref 35–45)
PCO2 BLDA: 54.9 MMHG (ref 35–45)
PCO2 BLDA: 57.2 MMHG (ref 35–45)
PCO2 BLDA: 61.5 MMHG (ref 35–45)
PCO2 BLDA: 67.9 MMHG (ref 35–45)
PCO2 BLDA: 73.2 MMHG (ref 35–45)
PEEP: 10
PEEP: 5
PEEP: 8
PEEP: 8
PH SMN: 7.19 [PH] (ref 7.35–7.45)
PH SMN: 7.21 [PH] (ref 7.35–7.45)
PH SMN: 7.24 [PH] (ref 7.35–7.45)
PH SMN: 7.28 [PH] (ref 7.35–7.45)
PH SMN: 7.32 [PH] (ref 7.35–7.45)
PH SMN: 7.37 [PH] (ref 7.35–7.45)
PH SMN: 7.42 [PH] (ref 7.35–7.45)
PH SMN: 7.47 [PH] (ref 7.35–7.45)
PH SMN: 7.53 [PH] (ref 7.35–7.45)
PHOSPHATE SERPL-MCNC: 2.8 MG/DL (ref 2.7–4.5)
PHOSPHATE SERPL-MCNC: 3 MG/DL (ref 2.7–4.5)
PHOSPHATE SERPL-MCNC: 3.3 MG/DL (ref 2.7–4.5)
PHOSPHATE SERPL-MCNC: 3.5 MG/DL (ref 2.7–4.5)
PHOSPHATE SERPL-MCNC: 3.9 MG/DL (ref 2.7–4.5)
PHOSPHATE SERPL-MCNC: 4 MG/DL (ref 2.7–4.5)
PHOSPHATE SERPL-MCNC: 4.3 MG/DL (ref 2.7–4.5)
PHOSPHATE SERPL-MCNC: 5.8 MG/DL (ref 2.7–4.5)
PHOSPHATE SERPL-MCNC: 6.7 MG/DL (ref 2.7–4.5)
PHOSPHATE SERPL-MCNC: 6.8 MG/DL (ref 2.7–4.5)
PHOSPHATE SERPL-MCNC: 7.5 MG/DL (ref 2.7–4.5)
PIP: 20
PIP: 26
PIP: 34
PIP: 37
PIP: 38
PIP: 39
PISA TR MAX VEL: 2.85 M/S
PLATELET # BLD AUTO: 218 K/UL (ref 150–450)
PLATELET # BLD AUTO: 223 K/UL (ref 150–450)
PLATELET # BLD AUTO: 224 K/UL (ref 150–450)
PLATELET # BLD AUTO: 248 K/UL (ref 150–450)
PLATELET # BLD AUTO: 253 K/UL (ref 150–450)
PLATELET # BLD AUTO: 258 K/UL (ref 150–450)
PLATELET # BLD AUTO: 287 K/UL (ref 150–450)
PLATELET # BLD AUTO: 335 K/UL (ref 150–450)
PLATELET # BLD AUTO: 337 K/UL (ref 150–450)
PLATELET # BLD AUTO: 381 K/UL (ref 150–450)
PLATELET # BLD AUTO: 389 K/UL (ref 150–450)
PLATELET # BLD AUTO: 395 K/UL (ref 150–450)
PLATELET # BLD AUTO: 417 K/UL (ref 150–450)
PLATELET # BLD AUTO: 429 K/UL (ref 150–450)
PLATELET BLD QL SMEAR: ABNORMAL
PMV BLD AUTO: 10.9 FL (ref 9.2–12.9)
PMV BLD AUTO: 11 FL (ref 9.2–12.9)
PMV BLD AUTO: 11.3 FL (ref 9.2–12.9)
PMV BLD AUTO: 11.3 FL (ref 9.2–12.9)
PMV BLD AUTO: 11.5 FL (ref 9.2–12.9)
PMV BLD AUTO: 11.7 FL (ref 9.2–12.9)
PMV BLD AUTO: ABNORMAL FL (ref 9.2–12.9)
PO2 BLDA: 123 MMHG (ref 80–100)
PO2 BLDA: 140 MMHG (ref 80–100)
PO2 BLDA: 164 MMHG (ref 80–100)
PO2 BLDA: 177 MMHG (ref 80–100)
PO2 BLDA: 197 MMHG (ref 80–100)
PO2 BLDA: 42 MMHG (ref 80–100)
PO2 BLDA: 51 MMHG (ref 80–100)
PO2 BLDA: 51 MMHG (ref 80–100)
PO2 BLDA: 55 MMHG (ref 80–100)
PO2 BLDA: 65 MMHG (ref 80–100)
PO2 BLDA: 67 MMHG (ref 80–100)
PO2 BLDA: 90 MMHG (ref 80–100)
POC BE: -1 MMOL/L
POC BE: 0 MMOL/L
POC BE: 1 MMOL/L
POC BE: 12 MMOL/L
POC BE: 13 MMOL/L
POC BE: 13 MMOL/L
POC BE: 14 MMOL/L
POC BE: 2 MMOL/L
POC BE: 2 MMOL/L
POC BE: 7 MMOL/L
POC IONIZED CALCIUM: 0.99 MMOL/L (ref 1.06–1.42)
POC IONIZED CALCIUM: 1 MMOL/L (ref 1.06–1.42)
POC IONIZED CALCIUM: 1.08 MMOL/L (ref 1.06–1.42)
POC IONIZED CALCIUM: 1.11 MMOL/L (ref 1.06–1.42)
POC SATURATED O2: 100 % (ref 95–100)
POC SATURATED O2: 100 % (ref 95–100)
POC SATURATED O2: 76 % (ref 95–100)
POC SATURATED O2: 78 % (ref 95–100)
POC SATURATED O2: 82 % (ref 95–100)
POC SATURATED O2: 88 % (ref 95–100)
POC SATURATED O2: 93 % (ref 95–100)
POC SATURATED O2: 94 % (ref 95–100)
POC SATURATED O2: 96 % (ref 95–100)
POC SATURATED O2: 98 % (ref 95–100)
POC SATURATED O2: 99 % (ref 95–100)
POC SATURATED O2: 99 % (ref 95–100)
POC TCO2: 27 MMOL/L (ref 23–27)
POC TCO2: 28 MMOL/L (ref 23–27)
POC TCO2: 29 MMOL/L (ref 23–27)
POC TCO2: 30 MMOL/L (ref 23–27)
POC TCO2: 30 MMOL/L (ref 23–27)
POC TCO2: 32 MMOL/L (ref 23–27)
POC TCO2: 37 MMOL/L (ref 23–27)
POC TCO2: 37 MMOL/L (ref 23–27)
POC TCO2: 38 MMOL/L (ref 23–27)
POC TCO2: 40 MMOL/L (ref 23–27)
POCT GLUCOSE: 107 MG/DL (ref 70–110)
POCT GLUCOSE: 107 MG/DL (ref 70–110)
POCT GLUCOSE: 115 MG/DL (ref 70–110)
POCT GLUCOSE: 123 MG/DL (ref 70–110)
POCT GLUCOSE: 139 MG/DL (ref 70–110)
POCT GLUCOSE: 150 MG/DL (ref 70–110)
POCT GLUCOSE: 67 MG/DL (ref 70–110)
POCT GLUCOSE: 73 MG/DL (ref 70–110)
POCT GLUCOSE: 85 MG/DL (ref 70–110)
POCT GLUCOSE: 97 MG/DL (ref 70–110)
POCT GLUCOSE: >500 MG/DL (ref 70–110)
POIKILOCYTOSIS BLD QL SMEAR: ABNORMAL
POIKILOCYTOSIS BLD QL SMEAR: SLIGHT
POLYCHROMASIA BLD QL SMEAR: ABNORMAL
POTASSIUM BLD-SCNC: 3.2 MMOL/L (ref 3.5–5.1)
POTASSIUM BLD-SCNC: 4 MMOL/L (ref 3.5–5.1)
POTASSIUM BLD-SCNC: 4 MMOL/L (ref 3.5–5.1)
POTASSIUM BLD-SCNC: 5.8 MMOL/L (ref 3.5–5.1)
POTASSIUM SERPL-SCNC: 3.1 MMOL/L (ref 3.5–5.1)
POTASSIUM SERPL-SCNC: 3.2 MMOL/L (ref 3.5–5.1)
POTASSIUM SERPL-SCNC: 3.4 MMOL/L (ref 3.5–5.1)
POTASSIUM SERPL-SCNC: 3.4 MMOL/L (ref 3.5–5.1)
POTASSIUM SERPL-SCNC: 3.5 MMOL/L (ref 3.5–5.1)
POTASSIUM SERPL-SCNC: 3.6 MMOL/L (ref 3.5–5.1)
POTASSIUM SERPL-SCNC: 3.7 MMOL/L (ref 3.5–5.1)
POTASSIUM SERPL-SCNC: 3.8 MMOL/L (ref 3.5–5.1)
POTASSIUM SERPL-SCNC: 3.8 MMOL/L (ref 3.5–5.1)
POTASSIUM SERPL-SCNC: 4.1 MMOL/L (ref 3.5–5.1)
POTASSIUM SERPL-SCNC: 4.2 MMOL/L (ref 3.5–5.1)
POTASSIUM SERPL-SCNC: 4.4 MMOL/L (ref 3.5–5.1)
POTASSIUM SERPL-SCNC: 4.6 MMOL/L (ref 3.5–5.1)
POTASSIUM SERPL-SCNC: 4.6 MMOL/L (ref 3.5–5.1)
POTASSIUM SERPL-SCNC: 4.8 MMOL/L (ref 3.5–5.1)
POTASSIUM SERPL-SCNC: 4.8 MMOL/L (ref 3.5–5.1)
POTASSIUM SERPL-SCNC: 5.1 MMOL/L (ref 3.5–5.1)
POTASSIUM SERPL-SCNC: 5.7 MMOL/L (ref 3.5–5.1)
POTASSIUM SERPL-SCNC: 5.7 MMOL/L (ref 3.5–5.1)
POTASSIUM SERPL-SCNC: 5.8 MMOL/L (ref 3.5–5.1)
PROCALCITONIN SERPL IA-MCNC: 4.94 NG/ML
PROT SERPL-MCNC: 5.7 G/DL (ref 6–8.4)
PROT SERPL-MCNC: 5.7 G/DL (ref 6–8.4)
PROT SERPL-MCNC: 5.8 G/DL (ref 6–8.4)
PROT SERPL-MCNC: 5.8 G/DL (ref 6–8.4)
PROT SERPL-MCNC: 6.5 G/DL (ref 6–8.4)
PROT SERPL-MCNC: 6.6 G/DL (ref 6–8.4)
PROT SERPL-MCNC: 6.6 G/DL (ref 6–8.4)
PROT SERPL-MCNC: 6.7 G/DL (ref 6–8.4)
PROT SERPL-MCNC: 6.8 G/DL (ref 6–8.4)
PROT SERPL-MCNC: 6.8 G/DL (ref 6–8.4)
PROT SERPL-MCNC: 6.9 G/DL (ref 6–8.4)
PROT SERPL-MCNC: 7 G/DL (ref 6–8.4)
PROT SERPL-MCNC: 7 G/DL (ref 6–8.4)
PROTHROMBIN TIME: 12.5 SEC (ref 9–12.5)
PROTHROMBIN TIME: 14.7 SEC (ref 9–12.5)
RA MAJOR: 4.79 CM
RA PRESSURE ESTIMATED: 3 MMHG
RA WIDTH: 3.37 CM
RBC # BLD AUTO: 2.41 M/UL (ref 4.6–6.2)
RBC # BLD AUTO: 2.58 M/UL (ref 4.6–6.2)
RBC # BLD AUTO: 2.65 M/UL (ref 4.6–6.2)
RBC # BLD AUTO: 2.8 M/UL (ref 4.6–6.2)
RBC # BLD AUTO: 2.83 M/UL (ref 4.6–6.2)
RBC # BLD AUTO: 2.89 M/UL (ref 4.6–6.2)
RBC # BLD AUTO: 2.9 M/UL (ref 4.6–6.2)
RBC # BLD AUTO: 2.93 M/UL (ref 4.6–6.2)
RBC # BLD AUTO: 2.95 M/UL (ref 4.6–6.2)
RBC # BLD AUTO: 3.01 M/UL (ref 4.6–6.2)
RBC # BLD AUTO: 3.04 M/UL (ref 4.6–6.2)
RBC # BLD AUTO: 3.11 M/UL (ref 4.6–6.2)
RBC # BLD AUTO: 3.11 M/UL (ref 4.6–6.2)
RBC # BLD AUTO: 3.21 M/UL (ref 4.6–6.2)
RIGHT ATRIUM END SYSTOLIC VOLUME APICAL 4 CHAMBER INDEX BSA: 16.8 ML/M2
RIGHT VENTRICLE DIASTOLIC BASEL DIMENSION: 3.2 CM
RV TB RVSP: 6 MMHG
SAMPLE: ABNORMAL
SCHISTOCYTES BLD QL SMEAR: ABNORMAL
SCHISTOCYTES BLD QL SMEAR: PRESENT
SCHISTOCYTES BLD QL SMEAR: PRESENT
SINUS: 3.17 CM
SITE: ABNORMAL
SODIUM BLD-SCNC: 136 MMOL/L (ref 136–145)
SODIUM BLD-SCNC: 136 MMOL/L (ref 136–145)
SODIUM BLD-SCNC: 140 MMOL/L (ref 136–145)
SODIUM BLD-SCNC: 145 MMOL/L (ref 136–145)
SODIUM SERPL-SCNC: 134 MMOL/L (ref 136–145)
SODIUM SERPL-SCNC: 135 MMOL/L (ref 136–145)
SODIUM SERPL-SCNC: 136 MMOL/L (ref 136–145)
SODIUM SERPL-SCNC: 137 MMOL/L (ref 136–145)
SODIUM SERPL-SCNC: 137 MMOL/L (ref 136–145)
SODIUM SERPL-SCNC: 138 MMOL/L (ref 136–145)
SODIUM SERPL-SCNC: 138 MMOL/L (ref 136–145)
SODIUM SERPL-SCNC: 139 MMOL/L (ref 136–145)
SODIUM SERPL-SCNC: 139 MMOL/L (ref 136–145)
SODIUM SERPL-SCNC: 140 MMOL/L (ref 136–145)
SODIUM SERPL-SCNC: 142 MMOL/L (ref 136–145)
SODIUM SERPL-SCNC: 142 MMOL/L (ref 136–145)
SODIUM SERPL-SCNC: 146 MMOL/L (ref 136–145)
SODIUM SERPL-SCNC: 146 MMOL/L (ref 136–145)
SODIUM SERPL-SCNC: 147 MMOL/L (ref 136–145)
SODIUM SERPL-SCNC: 147 MMOL/L (ref 136–145)
SODIUM SERPL-SCNC: 148 MMOL/L (ref 136–145)
SODIUM SERPL-SCNC: 151 MMOL/L (ref 136–145)
SODIUM SERPL-SCNC: 152 MMOL/L (ref 136–145)
SP02: 100
SP02: 100
SP02: 97
SPECIMEN OUTDATE: NORMAL
SPHEROCYTES BLD QL SMEAR: ABNORMAL
STJ: 2.49 CM
TARGETS BLD QL SMEAR: ABNORMAL
TDI LATERAL: 0.09 M/S
TDI SEPTAL: 0.05 M/S
TDI: 0.07 M/S
TOXIC GRANULES BLD QL SMEAR: PRESENT
TR MAX PG: 32 MMHG
TRANS ERYTHROCYTES VOL PATIENT: NORMAL ML
TRANS ERYTHROCYTES VOL PATIENT: NORMAL ML
TRICUSPID ANNULAR PLANE SYSTOLIC EXCURSION: 1.7 CM
TRIGL SERPL-MCNC: 146 MG/DL (ref 30–150)
TRIGL SERPL-MCNC: 199 MG/DL (ref 30–150)
TRIGL SERPL-MCNC: 274 MG/DL (ref 30–150)
TROPONIN I SERPL DL<=0.01 NG/ML-MCNC: 0.12 NG/ML (ref 0–0.03)
TROPONIN I SERPL DL<=0.01 NG/ML-MCNC: 0.13 NG/ML (ref 0–0.03)
TROPONIN I SERPL DL<=0.01 NG/ML-MCNC: 0.17 NG/ML (ref 0–0.03)
TROPONIN I SERPL DL<=0.01 NG/ML-MCNC: 0.26 NG/ML (ref 0–0.03)
TROPONIN I SERPL DL<=0.01 NG/ML-MCNC: 0.36 NG/ML (ref 0–0.03)
TROPONIN I SERPL DL<=0.01 NG/ML-MCNC: 0.4 NG/ML (ref 0–0.03)
TROPONIN I SERPL DL<=0.01 NG/ML-MCNC: 0.43 NG/ML (ref 0–0.03)
TROPONIN I SERPL DL<=0.01 NG/ML-MCNC: 0.45 NG/ML (ref 0–0.03)
TROPONIN I SERPL DL<=0.01 NG/ML-MCNC: 0.45 NG/ML (ref 0–0.03)
TV PEAK GRADIENT: 33 MMHG
TV REST PULMONARY ARTERY PRESSURE: 35 MMHG
VANCOMYCIN SERPL-MCNC: 20.2 UG/ML
VANCOMYCIN TROUGH SERPL-MCNC: 30.7 UG/ML (ref 10–22)
VT: 329
VT: 346
VT: 398
VT: 432
WBC # BLD AUTO: 14.92 K/UL (ref 3.9–12.7)
WBC # BLD AUTO: 16.3 K/UL (ref 3.9–12.7)
WBC # BLD AUTO: 18.11 K/UL (ref 3.9–12.7)
WBC # BLD AUTO: 18.58 K/UL (ref 3.9–12.7)
WBC # BLD AUTO: 19.34 K/UL (ref 3.9–12.7)
WBC # BLD AUTO: 20.26 K/UL (ref 3.9–12.7)
WBC # BLD AUTO: 20.65 K/UL (ref 3.9–12.7)
WBC # BLD AUTO: 20.99 K/UL (ref 3.9–12.7)
WBC # BLD AUTO: 21.91 K/UL (ref 3.9–12.7)
WBC # BLD AUTO: 23.47 K/UL (ref 3.9–12.7)
WBC # BLD AUTO: 25.79 K/UL (ref 3.9–12.7)
WBC # BLD AUTO: 26.92 K/UL (ref 3.9–12.7)
WBC # BLD AUTO: 27.73 K/UL (ref 3.9–12.7)
WBC # BLD AUTO: 32.99 K/UL (ref 3.9–12.7)
WBC # FLD: NORMAL /CU MM
Z-SCORE OF LEFT VENTRICULAR DIMENSION IN END DIASTOLE: -2.16
Z-SCORE OF LEFT VENTRICULAR DIMENSION IN END SYSTOLE: -0.29

## 2024-01-01 PROCEDURE — 85730 THROMBOPLASTIN TIME PARTIAL: CPT

## 2024-01-01 PROCEDURE — 37000009 HC ANESTHESIA EA ADD 15 MINS: Performed by: INTERNAL MEDICINE

## 2024-01-01 PROCEDURE — 82803 BLOOD GASES ANY COMBINATION: CPT

## 2024-01-01 PROCEDURE — 27100171 HC OXYGEN HIGH FLOW UP TO 24 HOURS

## 2024-01-01 PROCEDURE — 25000003 PHARM REV CODE 250

## 2024-01-01 PROCEDURE — 99291 CRITICAL CARE FIRST HOUR: CPT | Mod: GC,,, | Performed by: INTERNAL MEDICINE

## 2024-01-01 PROCEDURE — 99291 CRITICAL CARE FIRST HOUR: CPT | Mod: GT,GC,, | Performed by: STUDENT IN AN ORGANIZED HEALTH CARE EDUCATION/TRAINING PROGRAM

## 2024-01-01 PROCEDURE — 84100 ASSAY OF PHOSPHORUS: CPT

## 2024-01-01 PROCEDURE — 83735 ASSAY OF MAGNESIUM: CPT

## 2024-01-01 PROCEDURE — 84295 ASSAY OF SERUM SODIUM: CPT

## 2024-01-01 PROCEDURE — 93005 ELECTROCARDIOGRAM TRACING: CPT

## 2024-01-01 PROCEDURE — 25000003 PHARM REV CODE 250: Performed by: STUDENT IN AN ORGANIZED HEALTH CARE EDUCATION/TRAINING PROGRAM

## 2024-01-01 PROCEDURE — 82550 ASSAY OF CK (CPK): CPT | Performed by: STUDENT IN AN ORGANIZED HEALTH CARE EDUCATION/TRAINING PROGRAM

## 2024-01-01 PROCEDURE — 76937 US GUIDE VASCULAR ACCESS: CPT

## 2024-01-01 PROCEDURE — 99233 SBSQ HOSP IP/OBS HIGH 50: CPT | Mod: ,,, | Performed by: STUDENT IN AN ORGANIZED HEALTH CARE EDUCATION/TRAINING PROGRAM

## 2024-01-01 PROCEDURE — 99900035 HC TECH TIME PER 15 MIN (STAT)

## 2024-01-01 PROCEDURE — 80048 BASIC METABOLIC PNL TOTAL CA: CPT | Mod: XB | Performed by: NURSE PRACTITIONER

## 2024-01-01 PROCEDURE — 84145 PROCALCITONIN (PCT): CPT | Performed by: INTERNAL MEDICINE

## 2024-01-01 PROCEDURE — 25000003 PHARM REV CODE 250: Performed by: NURSE PRACTITIONER

## 2024-01-01 PROCEDURE — 99223 1ST HOSP IP/OBS HIGH 75: CPT | Mod: ,,, | Performed by: CLINICAL NURSE SPECIALIST

## 2024-01-01 PROCEDURE — 63600175 PHARM REV CODE 636 W HCPCS

## 2024-01-01 PROCEDURE — 20000000 HC ICU ROOM

## 2024-01-01 PROCEDURE — 94761 N-INVAS EAR/PLS OXIMETRY MLT: CPT

## 2024-01-01 PROCEDURE — 80048 BASIC METABOLIC PNL TOTAL CA: CPT | Mod: 91,XB

## 2024-01-01 PROCEDURE — 87040 BLOOD CULTURE FOR BACTERIA: CPT | Mod: 59 | Performed by: STUDENT IN AN ORGANIZED HEALTH CARE EDUCATION/TRAINING PROGRAM

## 2024-01-01 PROCEDURE — 37799 UNLISTED PX VASCULAR SURGERY: CPT

## 2024-01-01 PROCEDURE — 63600175 PHARM REV CODE 636 W HCPCS: Performed by: STUDENT IN AN ORGANIZED HEALTH CARE EDUCATION/TRAINING PROGRAM

## 2024-01-01 PROCEDURE — 94003 VENT MGMT INPAT SUBQ DAY: CPT

## 2024-01-01 PROCEDURE — A4217 STERILE WATER/SALINE, 500 ML: HCPCS | Performed by: INTERNAL MEDICINE

## 2024-01-01 PROCEDURE — 63600175 PHARM REV CODE 636 W HCPCS: Performed by: NURSE PRACTITIONER

## 2024-01-01 PROCEDURE — 87040 BLOOD CULTURE FOR BACTERIA: CPT | Performed by: STUDENT IN AN ORGANIZED HEALTH CARE EDUCATION/TRAINING PROGRAM

## 2024-01-01 PROCEDURE — 86920 COMPATIBILITY TEST SPIN: CPT | Performed by: NURSE PRACTITIONER

## 2024-01-01 PROCEDURE — P9021 RED BLOOD CELLS UNIT: HCPCS

## 2024-01-01 PROCEDURE — 83690 ASSAY OF LIPASE: CPT

## 2024-01-01 PROCEDURE — 99233 SBSQ HOSP IP/OBS HIGH 50: CPT | Mod: ,,, | Performed by: CLINICAL NURSE SPECIALIST

## 2024-01-01 PROCEDURE — 80048 BASIC METABOLIC PNL TOTAL CA: CPT | Mod: 91,XB | Performed by: NURSE PRACTITIONER

## 2024-01-01 PROCEDURE — 80053 COMPREHEN METABOLIC PANEL: CPT | Mod: 91

## 2024-01-01 PROCEDURE — 87186 SC STD MICRODIL/AGAR DIL: CPT | Performed by: NURSE PRACTITIONER

## 2024-01-01 PROCEDURE — 25000003 PHARM REV CODE 250: Performed by: INTERNAL MEDICINE

## 2024-01-01 PROCEDURE — 27000221 HC OXYGEN, UP TO 24 HOURS

## 2024-01-01 PROCEDURE — 82330 ASSAY OF CALCIUM: CPT | Performed by: INTERNAL MEDICINE

## 2024-01-01 PROCEDURE — 25000003 PHARM REV CODE 250: Mod: JZ,JG | Performed by: NURSE PRACTITIONER

## 2024-01-01 PROCEDURE — 80053 COMPREHEN METABOLIC PANEL: CPT

## 2024-01-01 PROCEDURE — 36620 INSERTION CATHETER ARTERY: CPT | Mod: 59,,, | Performed by: NURSE PRACTITIONER

## 2024-01-01 PROCEDURE — 37000008 HC ANESTHESIA 1ST 15 MINUTES: Performed by: INTERNAL MEDICINE

## 2024-01-01 PROCEDURE — 94640 AIRWAY INHALATION TREATMENT: CPT

## 2024-01-01 PROCEDURE — 99900026 HC AIRWAY MAINTENANCE (STAT)

## 2024-01-01 PROCEDURE — 83605 ASSAY OF LACTIC ACID: CPT | Performed by: NURSE PRACTITIONER

## 2024-01-01 PROCEDURE — C1874 STENT, COATED/COV W/DEL SYS: HCPCS | Performed by: INTERNAL MEDICINE

## 2024-01-01 PROCEDURE — 87106 FUNGI IDENTIFICATION YEAST: CPT | Performed by: NURSE PRACTITIONER

## 2024-01-01 PROCEDURE — 99291 CRITICAL CARE FIRST HOUR: CPT | Mod: ,,,

## 2024-01-01 PROCEDURE — 0BH17EZ INSERTION OF ENDOTRACHEAL AIRWAY INTO TRACHEA, VIA NATURAL OR ARTIFICIAL OPENING: ICD-10-PCS | Performed by: INTERNAL MEDICINE

## 2024-01-01 PROCEDURE — 84132 ASSAY OF SERUM POTASSIUM: CPT

## 2024-01-01 PROCEDURE — 85025 COMPLETE CBC W/AUTO DIFF WBC: CPT | Mod: 91

## 2024-01-01 PROCEDURE — 99291 CRITICAL CARE FIRST HOUR: CPT | Mod: GT,,, | Performed by: STUDENT IN AN ORGANIZED HEALTH CARE EDUCATION/TRAINING PROGRAM

## 2024-01-01 PROCEDURE — C1769 GUIDE WIRE: HCPCS | Performed by: INTERNAL MEDICINE

## 2024-01-01 PROCEDURE — 5A1955Z RESPIRATORY VENTILATION, GREATER THAN 96 CONSECUTIVE HOURS: ICD-10-PCS | Performed by: INTERNAL MEDICINE

## 2024-01-01 PROCEDURE — 0W9930Z DRAINAGE OF RIGHT PLEURAL CAVITY WITH DRAINAGE DEVICE, PERCUTANEOUS APPROACH: ICD-10-PCS | Performed by: RADIOLOGY

## 2024-01-01 PROCEDURE — 87070 CULTURE OTHR SPECIMN AEROBIC: CPT | Performed by: NURSE PRACTITIONER

## 2024-01-01 PROCEDURE — 82330 ASSAY OF CALCIUM: CPT

## 2024-01-01 PROCEDURE — 30233N1 TRANSFUSION OF NONAUTOLOGOUS RED BLOOD CELLS INTO PERIPHERAL VEIN, PERCUTANEOUS APPROACH: ICD-10-PCS | Performed by: INTERNAL MEDICINE

## 2024-01-01 PROCEDURE — 84484 ASSAY OF TROPONIN QUANT: CPT | Mod: 91

## 2024-01-01 PROCEDURE — 63600175 PHARM REV CODE 636 W HCPCS: Performed by: INTERNAL MEDICINE

## 2024-01-01 PROCEDURE — 85025 COMPLETE CBC W/AUTO DIFF WBC: CPT

## 2024-01-01 PROCEDURE — 94002 VENT MGMT INPAT INIT DAY: CPT

## 2024-01-01 PROCEDURE — 63600175 PHARM REV CODE 636 W HCPCS: Mod: JG | Performed by: NURSE PRACTITIONER

## 2024-01-01 PROCEDURE — 87075 CULTR BACTERIA EXCEPT BLOOD: CPT | Performed by: NURSE PRACTITIONER

## 2024-01-01 PROCEDURE — 84484 ASSAY OF TROPONIN QUANT: CPT

## 2024-01-01 PROCEDURE — 99232 SBSQ HOSP IP/OBS MODERATE 35: CPT | Mod: ,,,

## 2024-01-01 PROCEDURE — 43266 EGD ENDOSCOPIC STENT PLACE: CPT | Performed by: INTERNAL MEDICINE

## 2024-01-01 PROCEDURE — 36620 INSERTION CATHETER ARTERY: CPT

## 2024-01-01 PROCEDURE — 85007 BL SMEAR W/DIFF WBC COUNT: CPT

## 2024-01-01 PROCEDURE — 99291 CRITICAL CARE FIRST HOUR: CPT | Mod: ,,, | Performed by: NURSE PRACTITIONER

## 2024-01-01 PROCEDURE — B4185 PARENTERAL SOL 10 GM LIPIDS: HCPCS | Performed by: INTERNAL MEDICINE

## 2024-01-01 PROCEDURE — 27201014 HC GRASPER DEVICE: Performed by: INTERNAL MEDICINE

## 2024-01-01 PROCEDURE — 83735 ASSAY OF MAGNESIUM: CPT | Mod: 91

## 2024-01-01 PROCEDURE — 84484 ASSAY OF TROPONIN QUANT: CPT | Mod: 91 | Performed by: INTERNAL MEDICINE

## 2024-01-01 PROCEDURE — 99233 SBSQ HOSP IP/OBS HIGH 50: CPT | Mod: ,,, | Performed by: INTERNAL MEDICINE

## 2024-01-01 PROCEDURE — 99238 HOSP IP/OBS DSCHRG MGMT 30/<: CPT | Mod: ,,, | Performed by: NURSE PRACTITIONER

## 2024-01-01 PROCEDURE — P9021 RED BLOOD CELLS UNIT: HCPCS | Performed by: NURSE PRACTITIONER

## 2024-01-01 PROCEDURE — 31500 INSERT EMERGENCY AIRWAY: CPT | Mod: ,,, | Performed by: INTERNAL MEDICINE

## 2024-01-01 PROCEDURE — 25500020 PHARM REV CODE 255: Performed by: INTERNAL MEDICINE

## 2024-01-01 PROCEDURE — 0DU38JZ SUPPLEMENT LOWER ESOPHAGUS WITH SYNTHETIC SUBSTITUTE, VIA NATURAL OR ARTIFICIAL OPENING ENDOSCOPIC: ICD-10-PCS | Performed by: INTERNAL MEDICINE

## 2024-01-01 PROCEDURE — 80202 ASSAY OF VANCOMYCIN: CPT | Performed by: INTERNAL MEDICINE

## 2024-01-01 PROCEDURE — 99292 CRITICAL CARE ADDL 30 MIN: CPT | Mod: 25,,,

## 2024-01-01 PROCEDURE — 83735 ASSAY OF MAGNESIUM: CPT | Mod: 91 | Performed by: NURSE PRACTITIONER

## 2024-01-01 PROCEDURE — 84478 ASSAY OF TRIGLYCERIDES: CPT | Performed by: INTERNAL MEDICINE

## 2024-01-01 PROCEDURE — 94799 UNLISTED PULMONARY SVC/PX: CPT

## 2024-01-01 PROCEDURE — A4217 STERILE WATER/SALINE, 500 ML: HCPCS | Performed by: NURSE PRACTITIONER

## 2024-01-01 PROCEDURE — 99291 CRITICAL CARE FIRST HOUR: CPT | Mod: 25,,,

## 2024-01-01 PROCEDURE — 83605 ASSAY OF LACTIC ACID: CPT | Performed by: INTERNAL MEDICINE

## 2024-01-01 PROCEDURE — 99291 CRITICAL CARE FIRST HOUR: CPT | Mod: ,,, | Performed by: INTERNAL MEDICINE

## 2024-01-01 PROCEDURE — 25000242 PHARM REV CODE 250 ALT 637 W/ HCPCS

## 2024-01-01 PROCEDURE — B4185 PARENTERAL SOL 10 GM LIPIDS: HCPCS

## 2024-01-01 PROCEDURE — 86920 COMPATIBILITY TEST SPIN: CPT

## 2024-01-01 PROCEDURE — 27200966 HC CLOSED SUCTION SYSTEM

## 2024-01-01 PROCEDURE — 36430 TRANSFUSION BLD/BLD COMPNT: CPT

## 2024-01-01 PROCEDURE — 85027 COMPLETE CBC AUTOMATED: CPT

## 2024-01-01 PROCEDURE — 36600 WITHDRAWAL OF ARTERIAL BLOOD: CPT

## 2024-01-01 PROCEDURE — 99223 1ST HOSP IP/OBS HIGH 75: CPT | Mod: 25,GC,, | Performed by: INTERNAL MEDICINE

## 2024-01-01 PROCEDURE — B4185 PARENTERAL SOL 10 GM LIPIDS: HCPCS | Performed by: NURSE PRACTITIONER

## 2024-01-01 PROCEDURE — 83605 ASSAY OF LACTIC ACID: CPT

## 2024-01-01 PROCEDURE — 93010 ELECTROCARDIOGRAM REPORT: CPT | Mod: 76,,, | Performed by: INTERNAL MEDICINE

## 2024-01-01 PROCEDURE — 80053 COMPREHEN METABOLIC PANEL: CPT | Mod: 91 | Performed by: NURSE PRACTITIONER

## 2024-01-01 PROCEDURE — 86850 RBC ANTIBODY SCREEN: CPT | Performed by: NURSE PRACTITIONER

## 2024-01-01 PROCEDURE — 94761 N-INVAS EAR/PLS OXIMETRY MLT: CPT | Mod: XB

## 2024-01-01 PROCEDURE — 84100 ASSAY OF PHOSPHORUS: CPT | Mod: 91

## 2024-01-01 PROCEDURE — 99497 ADVNCD CARE PLAN 30 MIN: CPT | Mod: 25,,, | Performed by: CLINICAL NURSE SPECIALIST

## 2024-01-01 PROCEDURE — 87205 SMEAR GRAM STAIN: CPT | Performed by: NURSE PRACTITIONER

## 2024-01-01 PROCEDURE — 85025 COMPLETE CBC W/AUTO DIFF WBC: CPT | Mod: 91 | Performed by: NURSE PRACTITIONER

## 2024-01-01 PROCEDURE — 63600175 PHARM REV CODE 636 W HCPCS: Mod: JZ,JG | Performed by: INTERNAL MEDICINE

## 2024-01-01 PROCEDURE — 43266 EGD ENDOSCOPIC STENT PLACE: CPT | Mod: 22,,, | Performed by: INTERNAL MEDICINE

## 2024-01-01 PROCEDURE — 25500020 PHARM REV CODE 255

## 2024-01-01 PROCEDURE — 74360 X-RAY GUIDE GI DILATION: CPT | Mod: 26,,, | Performed by: INTERNAL MEDICINE

## 2024-01-01 PROCEDURE — 93010 ELECTROCARDIOGRAM REPORT: CPT | Mod: ,,, | Performed by: INTERNAL MEDICINE

## 2024-01-01 PROCEDURE — 80048 BASIC METABOLIC PNL TOTAL CA: CPT | Mod: XB

## 2024-01-01 PROCEDURE — 89051 BODY FLUID CELL COUNT: CPT | Performed by: NURSE PRACTITIONER

## 2024-01-01 PROCEDURE — 99223 1ST HOSP IP/OBS HIGH 75: CPT | Mod: ,,, | Performed by: STUDENT IN AN ORGANIZED HEALTH CARE EDUCATION/TRAINING PROGRAM

## 2024-01-01 PROCEDURE — 74360 X-RAY GUIDE GI DILATION: CPT | Mod: TC | Performed by: INTERNAL MEDICINE

## 2024-01-01 PROCEDURE — 85610 PROTHROMBIN TIME: CPT

## 2024-01-01 PROCEDURE — 87102 FUNGUS ISOLATION CULTURE: CPT | Performed by: NURSE PRACTITIONER

## 2024-01-01 PROCEDURE — 83880 ASSAY OF NATRIURETIC PEPTIDE: CPT

## 2024-01-01 PROCEDURE — 36415 COLL VENOUS BLD VENIPUNCTURE: CPT | Performed by: NURSE PRACTITIONER

## 2024-01-01 PROCEDURE — A4217 STERILE WATER/SALINE, 500 ML: HCPCS

## 2024-01-01 PROCEDURE — 84484 ASSAY OF TROPONIN QUANT: CPT | Performed by: NURSE PRACTITIONER

## 2024-01-01 PROCEDURE — 84100 ASSAY OF PHOSPHORUS: CPT | Mod: 91 | Performed by: NURSE PRACTITIONER

## 2024-01-01 PROCEDURE — 85014 HEMATOCRIT: CPT

## 2024-01-01 PROCEDURE — 85610 PROTHROMBIN TIME: CPT | Performed by: NURSE PRACTITIONER

## 2024-01-01 RX ORDER — PROPOFOL 10 MG/ML
0-50 INJECTION, EMULSION INTRAVENOUS CONTINUOUS
Status: DISCONTINUED | OUTPATIENT
Start: 2024-01-01 | End: 2024-01-01

## 2024-01-01 RX ORDER — PROPOFOL 10 MG/ML
INJECTION, EMULSION INTRAVENOUS
Status: COMPLETED
Start: 2024-01-01 | End: 2024-01-01

## 2024-01-01 RX ORDER — GLYCOPYRROLATE 0.2 MG/ML
0.1 INJECTION INTRAMUSCULAR; INTRAVENOUS 3 TIMES DAILY PRN
Status: DISCONTINUED | OUTPATIENT
Start: 2024-01-01 | End: 2024-12-04 | Stop reason: HOSPADM

## 2024-01-01 RX ORDER — POTASSIUM CHLORIDE 29.8 MG/ML
40 INJECTION INTRAVENOUS ONCE
Status: COMPLETED | OUTPATIENT
Start: 2024-01-01 | End: 2024-01-01

## 2024-01-01 RX ORDER — CALCIUM GLUCONATE 20 MG/ML
1 INJECTION, SOLUTION INTRAVENOUS ONCE
Status: COMPLETED | OUTPATIENT
Start: 2024-01-01 | End: 2024-01-01

## 2024-01-01 RX ORDER — FENTANYL CITRATE 50 UG/ML
INJECTION, SOLUTION INTRAMUSCULAR; INTRAVENOUS
Status: COMPLETED
Start: 2024-01-01 | End: 2024-01-01

## 2024-01-01 RX ORDER — DEXMEDETOMIDINE HYDROCHLORIDE 4 UG/ML
0-1.4 INJECTION, SOLUTION INTRAVENOUS CONTINUOUS
Status: DISCONTINUED | OUTPATIENT
Start: 2024-01-01 | End: 2024-01-01

## 2024-01-01 RX ORDER — DEXTROSE MONOHYDRATE 50 MG/ML
INJECTION, SOLUTION INTRAVENOUS CONTINUOUS
Status: DISCONTINUED | OUTPATIENT
Start: 2024-01-01 | End: 2024-01-01

## 2024-01-01 RX ORDER — THIAMINE HCL 100 MG
100 TABLET ORAL DAILY
Status: DISCONTINUED | OUTPATIENT
Start: 2024-01-01 | End: 2024-01-01

## 2024-01-01 RX ORDER — POTASSIUM CHLORIDE 7.45 MG/ML
10 INJECTION INTRAVENOUS
Status: DISCONTINUED | OUTPATIENT
Start: 2024-01-01 | End: 2024-01-01

## 2024-01-01 RX ORDER — ETOMIDATE 2 MG/ML
INJECTION INTRAVENOUS
Status: COMPLETED
Start: 2024-01-01 | End: 2024-01-01

## 2024-01-01 RX ORDER — FENTANYL CITRATE 50 UG/ML
50 INJECTION, SOLUTION INTRAMUSCULAR; INTRAVENOUS
Status: DISCONTINUED | OUTPATIENT
Start: 2024-01-01 | End: 2024-01-01

## 2024-01-01 RX ORDER — ALBUTEROL SULFATE 2.5 MG/.5ML
10 SOLUTION RESPIRATORY (INHALATION) ONCE
Status: COMPLETED | OUTPATIENT
Start: 2024-01-01 | End: 2024-01-01

## 2024-01-01 RX ORDER — LORAZEPAM 2 MG/ML
1 INJECTION INTRAMUSCULAR EVERY 30 MIN PRN
Status: DISCONTINUED | OUTPATIENT
Start: 2024-01-01 | End: 2024-12-04 | Stop reason: HOSPADM

## 2024-01-01 RX ORDER — NOREPINEPHRINE BITARTRATE 0.02 MG/ML
INJECTION, SOLUTION INTRAVENOUS
Status: COMPLETED
Start: 2024-01-01 | End: 2024-01-01

## 2024-01-01 RX ORDER — ROCURONIUM BROMIDE 10 MG/ML
INJECTION, SOLUTION INTRAVENOUS
Status: COMPLETED
Start: 2024-01-01 | End: 2024-01-01

## 2024-01-01 RX ORDER — DEXTROSE MONOHYDRATE, SODIUM CHLORIDE, AND POTASSIUM CHLORIDE 50; 1.49; 4.5 G/1000ML; G/1000ML; G/1000ML
INJECTION, SOLUTION INTRAVENOUS CONTINUOUS
Status: DISPENSED | OUTPATIENT
Start: 2024-01-01 | End: 2024-01-01

## 2024-01-01 RX ORDER — LORAZEPAM 2 MG/ML
2 INJECTION INTRAMUSCULAR ONCE
Status: COMPLETED | OUTPATIENT
Start: 2024-01-01 | End: 2024-01-01

## 2024-01-01 RX ORDER — LINEZOLID 2 MG/ML
600 INJECTION, SOLUTION INTRAVENOUS
Status: DISCONTINUED | OUTPATIENT
Start: 2024-01-01 | End: 2024-01-01

## 2024-01-01 RX ORDER — FUROSEMIDE 10 MG/ML
80 INJECTION INTRAMUSCULAR; INTRAVENOUS EVERY 8 HOURS
Status: DISCONTINUED | OUTPATIENT
Start: 2024-01-01 | End: 2024-01-01

## 2024-01-01 RX ORDER — FUROSEMIDE 10 MG/ML
80 INJECTION INTRAMUSCULAR; INTRAVENOUS ONCE
Status: COMPLETED | OUTPATIENT
Start: 2024-01-01 | End: 2024-01-01

## 2024-01-01 RX ORDER — NOREPINEPHRINE BITARTRATE/D5W 8 MG/250ML
0-3 PLASTIC BAG, INJECTION (ML) INTRAVENOUS CONTINUOUS
Status: DISCONTINUED | OUTPATIENT
Start: 2024-01-01 | End: 2024-01-01

## 2024-01-01 RX ORDER — ACETAMINOPHEN 10 MG/ML
1000 INJECTION, SOLUTION INTRAVENOUS EVERY 8 HOURS
Status: COMPLETED | OUTPATIENT
Start: 2024-01-01 | End: 2024-01-01

## 2024-01-01 RX ORDER — PANTOPRAZOLE SODIUM 40 MG/10ML
40 INJECTION, POWDER, LYOPHILIZED, FOR SOLUTION INTRAVENOUS 2 TIMES DAILY
Status: DISCONTINUED | OUTPATIENT
Start: 2024-01-01 | End: 2024-01-01

## 2024-01-01 RX ORDER — ROCURONIUM BROMIDE 10 MG/ML
80 INJECTION, SOLUTION INTRAVENOUS ONCE
Status: COMPLETED | OUTPATIENT
Start: 2024-01-01 | End: 2024-01-01

## 2024-01-01 RX ORDER — DEXTROSE MONOHYDRATE 100 MG/ML
INJECTION, SOLUTION INTRAVENOUS CONTINUOUS PRN
Status: DISCONTINUED | OUTPATIENT
Start: 2024-01-01 | End: 2024-01-01

## 2024-01-01 RX ORDER — HEPARIN SODIUM 5000 [USP'U]/ML
5000 INJECTION, SOLUTION INTRAVENOUS; SUBCUTANEOUS EVERY 8 HOURS
Status: COMPLETED | OUTPATIENT
Start: 2024-01-01 | End: 2024-01-01

## 2024-01-01 RX ORDER — FENTANYL CITRATE-0.9 % NACL/PF 10 MCG/ML
0-250 PLASTIC BAG, INJECTION (ML) INTRAVENOUS CONTINUOUS
Status: DISCONTINUED | OUTPATIENT
Start: 2024-01-01 | End: 2024-01-01

## 2024-01-01 RX ORDER — LORAZEPAM 2 MG/ML
0.5 INJECTION INTRAMUSCULAR EVERY 30 MIN PRN
Status: DISCONTINUED | OUTPATIENT
Start: 2024-01-01 | End: 2024-01-01

## 2024-01-01 RX ORDER — PANTOPRAZOLE SODIUM 40 MG/10ML
40 INJECTION, POWDER, LYOPHILIZED, FOR SOLUTION INTRAVENOUS DAILY
Status: DISCONTINUED | OUTPATIENT
Start: 2024-01-01 | End: 2024-01-01

## 2024-01-01 RX ORDER — INSULIN ASPART 100 [IU]/ML
0-5 INJECTION, SOLUTION INTRAVENOUS; SUBCUTANEOUS EVERY 4 HOURS PRN
Status: DISCONTINUED | OUTPATIENT
Start: 2024-01-01 | End: 2024-01-01

## 2024-01-01 RX ORDER — NOREPINEPHRINE BITARTRATE 0.02 MG/ML
0-3 INJECTION, SOLUTION INTRAVENOUS CONTINUOUS
Status: DISCONTINUED | OUTPATIENT
Start: 2024-01-01 | End: 2024-01-01

## 2024-01-01 RX ORDER — ETOMIDATE 2 MG/ML
10 INJECTION INTRAVENOUS ONCE
Status: COMPLETED | OUTPATIENT
Start: 2024-01-01 | End: 2024-01-01

## 2024-01-01 RX ORDER — SODIUM CHLORIDE 0.9 % (FLUSH) 0.9 %
10 SYRINGE (ML) INJECTION
Status: DISCONTINUED | OUTPATIENT
Start: 2024-01-01 | End: 2024-01-01

## 2024-01-01 RX ORDER — POTASSIUM CHLORIDE 14.9 MG/ML
20 INJECTION INTRAVENOUS ONCE
Status: COMPLETED | OUTPATIENT
Start: 2024-01-01 | End: 2024-01-01

## 2024-01-01 RX ORDER — DEXMEDETOMIDINE HYDROCHLORIDE 4 UG/ML
INJECTION, SOLUTION INTRAVENOUS
Status: COMPLETED
Start: 2024-01-01 | End: 2024-01-01

## 2024-01-01 RX ORDER — HYDROMORPHONE HYDROCHLORIDE 1 MG/ML
0.5 INJECTION, SOLUTION INTRAMUSCULAR; INTRAVENOUS; SUBCUTANEOUS EVERY 6 HOURS PRN
Status: DISCONTINUED | OUTPATIENT
Start: 2024-01-01 | End: 2024-12-04 | Stop reason: HOSPADM

## 2024-01-01 RX ORDER — MORPHINE SULFATE 1 MG/ML
0-10 INJECTION, SOLUTION INTRAVENOUS CONTINUOUS
Status: DISCONTINUED | OUTPATIENT
Start: 2024-01-01 | End: 2024-12-04 | Stop reason: HOSPADM

## 2024-01-01 RX ORDER — ONDANSETRON HYDROCHLORIDE 2 MG/ML
4 INJECTION, SOLUTION INTRAVENOUS ONCE AS NEEDED
Status: COMPLETED | OUTPATIENT
Start: 2024-01-01 | End: 2024-01-01

## 2024-01-01 RX ORDER — GLUCAGON 1 MG
1 KIT INJECTION
Status: DISCONTINUED | OUTPATIENT
Start: 2024-01-01 | End: 2024-01-01

## 2024-01-01 RX ORDER — HEPARIN SODIUM 5000 [USP'U]/ML
5000 INJECTION, SOLUTION INTRAVENOUS; SUBCUTANEOUS EVERY 8 HOURS
Status: DISCONTINUED | OUTPATIENT
Start: 2024-01-01 | End: 2024-01-01

## 2024-01-01 RX ORDER — MUPIROCIN 20 MG/G
OINTMENT TOPICAL 2 TIMES DAILY
Status: DISCONTINUED | OUTPATIENT
Start: 2024-01-01 | End: 2024-01-01

## 2024-01-01 RX ORDER — HYDROMORPHONE HYDROCHLORIDE 1 MG/ML
0.5 INJECTION, SOLUTION INTRAMUSCULAR; INTRAVENOUS; SUBCUTANEOUS ONCE
Status: COMPLETED | OUTPATIENT
Start: 2024-01-01 | End: 2024-01-01

## 2024-01-01 RX ORDER — NOREPINEPHRINE BITARTRATE 0.02 MG/ML
0-.2 INJECTION, SOLUTION INTRAVENOUS CONTINUOUS
Status: DISPENSED | OUTPATIENT
Start: 2024-01-01 | End: 2024-01-01

## 2024-01-01 RX ORDER — HYDROCODONE BITARTRATE AND ACETAMINOPHEN 500; 5 MG/1; MG/1
TABLET ORAL
Status: DISCONTINUED | OUTPATIENT
Start: 2024-01-01 | End: 2024-01-01

## 2024-01-01 RX ORDER — FUROSEMIDE 10 MG/ML
40 INJECTION INTRAMUSCULAR; INTRAVENOUS ONCE
Status: COMPLETED | OUTPATIENT
Start: 2024-01-01 | End: 2024-01-01

## 2024-01-01 RX ORDER — PHENYLEPHRINE HCL IN 0.9% NACL 1 MG/10 ML
SYRINGE (ML) INTRAVENOUS
Status: COMPLETED
Start: 2024-01-01 | End: 2024-01-01

## 2024-01-01 RX ORDER — FUROSEMIDE 10 MG/ML
80 INJECTION INTRAMUSCULAR; INTRAVENOUS EVERY 12 HOURS
Status: DISCONTINUED | OUTPATIENT
Start: 2024-01-01 | End: 2024-01-01

## 2024-01-01 RX ORDER — IPRATROPIUM BROMIDE AND ALBUTEROL SULFATE 2.5; .5 MG/3ML; MG/3ML
3 SOLUTION RESPIRATORY (INHALATION) ONCE
Status: COMPLETED | OUTPATIENT
Start: 2024-01-01 | End: 2024-01-01

## 2024-01-01 RX ORDER — EPINEPHRINE 1 MG/ML
INJECTION, SOLUTION, CONCENTRATE INTRAVENOUS
Status: DISCONTINUED
Start: 2024-01-01 | End: 2024-01-01 | Stop reason: WASHOUT

## 2024-01-01 RX ORDER — NOREPINEPHRINE BIT/0.9 % NACL 32MG/250ML
0-3 PLASTIC BAG, INJECTION (ML) INTRAVENOUS CONTINUOUS
Status: DISCONTINUED | OUTPATIENT
Start: 2024-01-01 | End: 2024-01-01

## 2024-01-01 RX ORDER — ACETAMINOPHEN 650 MG/1
650 SUPPOSITORY RECTAL EVERY 6 HOURS PRN
Status: DISCONTINUED | OUTPATIENT
Start: 2024-01-01 | End: 2024-01-01

## 2024-01-01 RX ADMIN — POTASSIUM CHLORIDE 40 MEQ: 29.8 INJECTION, SOLUTION INTRAVENOUS at 03:12

## 2024-01-01 RX ADMIN — Medication 4 MG: at 02:11

## 2024-01-01 RX ADMIN — MEROPENEM 2 G: 2 INJECTION, POWDER, FOR SOLUTION INTRAVENOUS at 11:11

## 2024-01-01 RX ADMIN — PROPOFOL 35 MCG/KG/MIN: 10 INJECTION, EMULSION INTRAVENOUS at 11:11

## 2024-01-01 RX ADMIN — VANCOMYCIN HYDROCHLORIDE 1000 MG: 1 INJECTION, POWDER, LYOPHILIZED, FOR SOLUTION INTRAVENOUS at 08:11

## 2024-01-01 RX ADMIN — ACETAMINOPHEN 1000 MG: 10 INJECTION, SOLUTION INTRAVENOUS at 09:11

## 2024-01-01 RX ADMIN — PROPOFOL 50 MCG/KG/MIN: 10 INJECTION, EMULSION INTRAVENOUS at 08:12

## 2024-01-01 RX ADMIN — VANCOMYCIN HYDROCHLORIDE 500 MG: 500 INJECTION, POWDER, LYOPHILIZED, FOR SOLUTION INTRAVENOUS at 01:11

## 2024-01-01 RX ADMIN — HEPARIN SODIUM 5000 UNITS: 5000 INJECTION INTRAVENOUS; SUBCUTANEOUS at 05:12

## 2024-01-01 RX ADMIN — HYDROMORPHONE HYDROCHLORIDE 0.5 MG: 1 INJECTION, SOLUTION INTRAMUSCULAR; INTRAVENOUS; SUBCUTANEOUS at 05:11

## 2024-01-01 RX ADMIN — LINEZOLID 600 MG: 600 INJECTION, SOLUTION INTRAVENOUS at 02:12

## 2024-01-01 RX ADMIN — PANTOPRAZOLE SODIUM 40 MG: 40 INJECTION, POWDER, LYOPHILIZED, FOR SOLUTION INTRAVENOUS at 08:11

## 2024-01-01 RX ADMIN — HYDROMORPHONE HYDROCHLORIDE 0.5 MG: 1 INJECTION, SOLUTION INTRAMUSCULAR; INTRAVENOUS; SUBCUTANEOUS at 01:11

## 2024-01-01 RX ADMIN — Medication 150 MCG/HR: at 12:12

## 2024-01-01 RX ADMIN — POTASSIUM CHLORIDE 20 MEQ: 200 INJECTION, SOLUTION INTRAVENOUS at 01:11

## 2024-01-01 RX ADMIN — THIAMINE HYDROCHLORIDE 500 MG: 100 INJECTION, SOLUTION INTRAMUSCULAR; INTRAVENOUS at 08:11

## 2024-01-01 RX ADMIN — SODIUM CHLORIDE 2 G: 9 INJECTION, SOLUTION INTRAVENOUS at 09:11

## 2024-01-01 RX ADMIN — FENTANYL CITRATE 50 MCG: 50 INJECTION INTRAMUSCULAR; INTRAVENOUS at 08:11

## 2024-01-01 RX ADMIN — SODIUM ZIRCONIUM CYCLOSILICATE 10 G: 5 POWDER, FOR SUSPENSION ORAL at 11:12

## 2024-01-01 RX ADMIN — MAGNESIUM SULFATE HEPTAHYDRATE: 500 INJECTION, SOLUTION INTRAMUSCULAR; INTRAVENOUS at 09:11

## 2024-01-01 RX ADMIN — SODIUM CHLORIDE 2 G: 9 INJECTION, SOLUTION INTRAVENOUS at 08:11

## 2024-01-01 RX ADMIN — PANTOPRAZOLE SODIUM 40 MG: 40 INJECTION, POWDER, LYOPHILIZED, FOR SOLUTION INTRAVENOUS at 08:12

## 2024-01-01 RX ADMIN — DAPTOMYCIN 620 MG: 350 INJECTION, POWDER, LYOPHILIZED, FOR SOLUTION INTRAVENOUS at 01:11

## 2024-01-01 RX ADMIN — HYDROCORTISONE SODIUM SUCCINATE 100 MG: 100 INJECTION, POWDER, FOR SOLUTION INTRAMUSCULAR; INTRAVENOUS at 01:12

## 2024-01-01 RX ADMIN — LORAZEPAM 1 MG: 2 INJECTION INTRAMUSCULAR; INTRAVENOUS at 05:12

## 2024-01-01 RX ADMIN — HEPARIN SODIUM 5000 UNITS: 5000 INJECTION INTRAVENOUS; SUBCUTANEOUS at 05:11

## 2024-01-01 RX ADMIN — HEPARIN SODIUM 5000 UNITS: 5000 INJECTION INTRAVENOUS; SUBCUTANEOUS at 09:11

## 2024-01-01 RX ADMIN — VASOPRESSIN 0.04 UNITS/MIN: 20 INJECTION INTRAVENOUS at 07:12

## 2024-01-01 RX ADMIN — NOREPINEPHRINE BITARTRATE 0.15 MCG/KG/MIN: 8 INJECTION, SOLUTION INTRAVENOUS at 10:11

## 2024-01-01 RX ADMIN — PANTOPRAZOLE SODIUM 40 MG: 40 INJECTION, POWDER, LYOPHILIZED, FOR SOLUTION INTRAVENOUS at 09:11

## 2024-01-01 RX ADMIN — HEPARIN SODIUM 5000 UNITS: 5000 INJECTION INTRAVENOUS; SUBCUTANEOUS at 01:12

## 2024-01-01 RX ADMIN — SODIUM CHLORIDE 2 G: 9 INJECTION, SOLUTION INTRAVENOUS at 09:12

## 2024-01-01 RX ADMIN — PROPOFOL 50 MCG/KG/MIN: 10 INJECTION, EMULSION INTRAVENOUS at 01:11

## 2024-01-01 RX ADMIN — Medication 0.08 MCG/KG/MIN: at 08:11

## 2024-01-01 RX ADMIN — IOHEXOL 15 ML: 350 INJECTION, SOLUTION INTRAVENOUS at 01:11

## 2024-01-01 RX ADMIN — GLYCOPYRROLATE 0.2 MG: 0.2 INJECTION, SOLUTION INTRAMUSCULAR; INTRAVENOUS at 05:12

## 2024-01-01 RX ADMIN — DEXTROSE MONOHYDRATE: 5 INJECTION, SOLUTION INTRAVENOUS at 05:11

## 2024-01-01 RX ADMIN — ETOMIDATE 10 MG: 2 INJECTION, SOLUTION INTRAVENOUS at 03:11

## 2024-01-01 RX ADMIN — HYDROMORPHONE HYDROCHLORIDE 0.5 MG: 1 INJECTION, SOLUTION INTRAMUSCULAR; INTRAVENOUS; SUBCUTANEOUS at 07:11

## 2024-01-01 RX ADMIN — PROPOFOL 50 MCG/KG/MIN: 10 INJECTION, EMULSION INTRAVENOUS at 12:12

## 2024-01-01 RX ADMIN — IOHEXOL 100 ML: 350 INJECTION, SOLUTION INTRAVENOUS at 05:11

## 2024-01-01 RX ADMIN — MICAFUNGIN SODIUM 100 MG: 100 INJECTION, POWDER, LYOPHILIZED, FOR SOLUTION INTRAVENOUS at 09:12

## 2024-01-01 RX ADMIN — DEXTROSE MONOHYDRATE: 5 INJECTION, SOLUTION INTRAVENOUS at 07:11

## 2024-01-01 RX ADMIN — PROPOFOL 50 MCG/KG/MIN: 10 INJECTION, EMULSION INTRAVENOUS at 11:11

## 2024-01-01 RX ADMIN — PROPOFOL 50 MCG/KG/MIN: 10 INJECTION, EMULSION INTRAVENOUS at 06:11

## 2024-01-01 RX ADMIN — FUROSEMIDE 80 MG: 10 INJECTION, SOLUTION INTRAVENOUS at 05:12

## 2024-01-01 RX ADMIN — LORAZEPAM 2 MG: 2 INJECTION INTRAMUSCULAR; INTRAVENOUS at 06:12

## 2024-01-01 RX ADMIN — MUPIROCIN: 20 OINTMENT TOPICAL at 08:11

## 2024-01-01 RX ADMIN — VANCOMYCIN HYDROCHLORIDE 1000 MG: 1 INJECTION, POWDER, LYOPHILIZED, FOR SOLUTION INTRAVENOUS at 09:11

## 2024-01-01 RX ADMIN — SODIUM ZIRCONIUM CYCLOSILICATE 10 G: 5 POWDER, FOR SUSPENSION ORAL at 09:12

## 2024-01-01 RX ADMIN — VASOPRESSIN 0.04 UNITS/MIN: 20 INJECTION INTRAVENOUS at 09:12

## 2024-01-01 RX ADMIN — HYDROCORTISONE SODIUM SUCCINATE 100 MG: 100 INJECTION, POWDER, FOR SOLUTION INTRAMUSCULAR; INTRAVENOUS at 09:12

## 2024-01-01 RX ADMIN — HYDROMORPHONE HYDROCHLORIDE 0.5 MG: 1 INJECTION, SOLUTION INTRAMUSCULAR; INTRAVENOUS; SUBCUTANEOUS at 03:11

## 2024-01-01 RX ADMIN — FENTANYL CITRATE 50 MCG: 50 INJECTION INTRAMUSCULAR; INTRAVENOUS at 01:11

## 2024-01-01 RX ADMIN — PROPOFOL 50 MCG/KG/MIN: 10 INJECTION, EMULSION INTRAVENOUS at 05:11

## 2024-01-01 RX ADMIN — VASOPRESSIN 0.04 UNITS/MIN: 20 INJECTION INTRAVENOUS at 01:12

## 2024-01-01 RX ADMIN — FOLIC ACID 1 MG: 5 INJECTION, SOLUTION INTRAMUSCULAR; INTRAVENOUS; SUBCUTANEOUS at 10:11

## 2024-01-01 RX ADMIN — HYDROMORPHONE HYDROCHLORIDE 0.5 MG: 1 INJECTION, SOLUTION INTRAMUSCULAR; INTRAVENOUS; SUBCUTANEOUS at 11:11

## 2024-01-01 RX ADMIN — SODIUM CHLORIDE 2 G: 9 INJECTION, SOLUTION INTRAVENOUS at 10:11

## 2024-01-01 RX ADMIN — IPRATROPIUM BROMIDE AND ALBUTEROL SULFATE 3 ML: .5; 3 SOLUTION RESPIRATORY (INHALATION) at 10:11

## 2024-01-01 RX ADMIN — THIAMINE HYDROCHLORIDE 500 MG: 100 INJECTION, SOLUTION INTRAMUSCULAR; INTRAVENOUS at 03:11

## 2024-01-01 RX ADMIN — I.V. FAT EMULSION 250 ML: 20 EMULSION INTRAVENOUS at 09:11

## 2024-01-01 RX ADMIN — HEPARIN SODIUM 5000 UNITS: 5000 INJECTION INTRAVENOUS; SUBCUTANEOUS at 01:11

## 2024-01-01 RX ADMIN — MAGNESIUM SULFATE HEPTAHYDRATE: 500 INJECTION, SOLUTION INTRAMUSCULAR; INTRAVENOUS at 10:12

## 2024-01-01 RX ADMIN — PROPOFOL 10 MCG/KG/MIN: 10 INJECTION, EMULSION INTRAVENOUS at 03:12

## 2024-01-01 RX ADMIN — PROPOFOL 50 MCG/KG/MIN: 10 INJECTION, EMULSION INTRAVENOUS at 09:11

## 2024-01-01 RX ADMIN — ETOMIDATE 10 MG: 2 INJECTION INTRAVENOUS at 03:11

## 2024-01-01 RX ADMIN — MUPIROCIN: 20 OINTMENT TOPICAL at 09:12

## 2024-01-01 RX ADMIN — DEXMEDETOMIDINE HYDROCHLORIDE 0.6 MCG/KG/HR: 4 INJECTION, SOLUTION INTRAVENOUS at 09:11

## 2024-01-01 RX ADMIN — VASOPRESSIN 0.04 UNITS/MIN: 20 INJECTION INTRAVENOUS at 12:12

## 2024-01-01 RX ADMIN — FUROSEMIDE 80 MG: 10 INJECTION, SOLUTION INTRAVENOUS at 09:11

## 2024-01-01 RX ADMIN — NOREPINEPHRINE BITARTRATE 0.08 MCG/KG/MIN: 8 INJECTION, SOLUTION INTRAVENOUS at 09:11

## 2024-01-01 RX ADMIN — I.V. FAT EMULSION 250 ML: 20 EMULSION INTRAVENOUS at 10:11

## 2024-01-01 RX ADMIN — DEXTROSE, SODIUM CHLORIDE, AND POTASSIUM CHLORIDE: 5; .45; .15 INJECTION INTRAVENOUS at 05:11

## 2024-01-01 RX ADMIN — POTASSIUM CHLORIDE 40 MEQ: 29.8 INJECTION, SOLUTION INTRAVENOUS at 06:11

## 2024-01-01 RX ADMIN — THIAMINE HYDROCHLORIDE 500 MG: 100 INJECTION, SOLUTION INTRAMUSCULAR; INTRAVENOUS at 09:11

## 2024-01-01 RX ADMIN — POTASSIUM CHLORIDE 40 MEQ: 29.8 INJECTION, SOLUTION INTRAVENOUS at 05:11

## 2024-01-01 RX ADMIN — PANTOPRAZOLE SODIUM 40 MG: 40 INJECTION, POWDER, LYOPHILIZED, FOR SOLUTION INTRAVENOUS at 09:12

## 2024-01-01 RX ADMIN — HYDROMORPHONE HYDROCHLORIDE 0.5 MG: 1 INJECTION, SOLUTION INTRAMUSCULAR; INTRAVENOUS; SUBCUTANEOUS at 09:11

## 2024-01-01 RX ADMIN — DEXMEDETOMIDINE HYDROCHLORIDE 0.2 MCG/KG/HR: 4 INJECTION, SOLUTION INTRAVENOUS at 02:11

## 2024-01-01 RX ADMIN — SODIUM ZIRCONIUM CYCLOSILICATE 10 G: 5 POWDER, FOR SUSPENSION ORAL at 03:12

## 2024-01-01 RX ADMIN — LINEZOLID 600 MG: 600 INJECTION, SOLUTION INTRAVENOUS at 01:12

## 2024-01-01 RX ADMIN — Medication 200 MCG/HR: at 03:12

## 2024-01-01 RX ADMIN — THIAMINE HYDROCHLORIDE 500 MG: 100 INJECTION, SOLUTION INTRAMUSCULAR; INTRAVENOUS at 02:11

## 2024-01-01 RX ADMIN — DAPTOMYCIN 620 MG: 350 INJECTION, POWDER, LYOPHILIZED, FOR SOLUTION INTRAVENOUS at 12:11

## 2024-01-01 RX ADMIN — POTASSIUM CHLORIDE 40 MEQ: 29.8 INJECTION, SOLUTION INTRAVENOUS at 04:12

## 2024-01-01 RX ADMIN — MICAFUNGIN SODIUM 100 MG: 100 INJECTION, POWDER, LYOPHILIZED, FOR SOLUTION INTRAVENOUS at 09:11

## 2024-01-01 RX ADMIN — PROPOFOL 50 MCG/KG/MIN: 10 INJECTION, EMULSION INTRAVENOUS at 04:12

## 2024-01-01 RX ADMIN — ACETAMINOPHEN 650 MG: 650 SUPPOSITORY RECTAL at 06:12

## 2024-01-01 RX ADMIN — SODIUM CHLORIDE 2 G: 9 INJECTION, SOLUTION INTRAVENOUS at 08:12

## 2024-01-01 RX ADMIN — PROPOFOL 15 MCG/KG/MIN: 10 INJECTION, EMULSION INTRAVENOUS at 02:12

## 2024-01-01 RX ADMIN — POTASSIUM CHLORIDE 40 MEQ: 29.8 INJECTION, SOLUTION INTRAVENOUS at 09:11

## 2024-01-01 RX ADMIN — ALBUTEROL SULFATE 10 MG: 2.5 SOLUTION RESPIRATORY (INHALATION) at 11:12

## 2024-01-01 RX ADMIN — Medication 0.1 MCG/KG/MIN: at 11:11

## 2024-01-01 RX ADMIN — MUPIROCIN: 20 OINTMENT TOPICAL at 08:12

## 2024-01-01 RX ADMIN — FENTANYL CITRATE 50 MCG: 50 INJECTION INTRAMUSCULAR; INTRAVENOUS at 10:11

## 2024-01-01 RX ADMIN — MICAFUNGIN SODIUM 100 MG: 100 INJECTION, POWDER, LYOPHILIZED, FOR SOLUTION INTRAVENOUS at 08:11

## 2024-01-01 RX ADMIN — NOREPINEPHRINE BITARTRATE 0.12 MCG/KG/MIN: 8 INJECTION, SOLUTION INTRAVENOUS at 12:11

## 2024-01-01 RX ADMIN — CALCIUM GLUCONATE 1 G: 20 INJECTION, SOLUTION INTRAVENOUS at 03:12

## 2024-01-01 RX ADMIN — MUPIROCIN: 20 OINTMENT TOPICAL at 09:11

## 2024-01-01 RX ADMIN — Medication 250 MCG/HR: at 02:12

## 2024-01-01 RX ADMIN — PROPOFOL 15 MCG/KG/MIN: 10 INJECTION, EMULSION INTRAVENOUS at 01:12

## 2024-01-01 RX ADMIN — PROPOFOL 35 MCG/KG/MIN: 10 INJECTION, EMULSION INTRAVENOUS at 06:11

## 2024-01-01 RX ADMIN — ONDANSETRON 4 MG: 2 INJECTION INTRAMUSCULAR; INTRAVENOUS at 07:11

## 2024-01-01 RX ADMIN — IOHEXOL 15 ML: 350 INJECTION, SOLUTION INTRAVENOUS at 02:11

## 2024-01-01 RX ADMIN — PROPOFOL 50 MCG/KG/MIN: 10 INJECTION, EMULSION INTRAVENOUS at 03:12

## 2024-01-01 RX ADMIN — ACETAMINOPHEN 1000 MG: 10 INJECTION, SOLUTION INTRAVENOUS at 05:11

## 2024-01-01 RX ADMIN — Medication 250 MCG/HR: at 11:12

## 2024-01-01 RX ADMIN — Medication 250 MCG/HR: at 06:11

## 2024-01-01 RX ADMIN — LORAZEPAM 2 MG: 2 INJECTION INTRAMUSCULAR; INTRAVENOUS at 05:12

## 2024-01-01 RX ADMIN — MICAFUNGIN SODIUM 100 MG: 100 INJECTION, POWDER, LYOPHILIZED, FOR SOLUTION INTRAVENOUS at 11:11

## 2024-01-01 RX ADMIN — Medication 2 MG/HR: at 05:12

## 2024-01-01 RX ADMIN — NOREPINEPHRINE BITARTRATE 0.62 MCG/KG/MIN: 1 INJECTION, SOLUTION, CONCENTRATE INTRAVENOUS at 05:12

## 2024-01-01 RX ADMIN — CALCIUM GLUCONATE 1 G: 20 INJECTION, SOLUTION INTRAVENOUS at 05:12

## 2024-01-01 RX ADMIN — HYDROCORTISONE SODIUM SUCCINATE 100 MG: 100 INJECTION, POWDER, FOR SOLUTION INTRAMUSCULAR; INTRAVENOUS at 05:12

## 2024-01-01 RX ADMIN — VASOPRESSIN 0.04 UNITS/MIN: 20 INJECTION INTRAVENOUS at 03:12

## 2024-01-01 RX ADMIN — POTASSIUM CHLORIDE 20 MEQ: 200 INJECTION, SOLUTION INTRAVENOUS at 05:12

## 2024-01-01 RX ADMIN — FUROSEMIDE 80 MG: 10 INJECTION, SOLUTION INTRAMUSCULAR; INTRAVENOUS at 12:12

## 2024-01-01 RX ADMIN — PROPOFOL 50 MCG/KG/MIN: 10 INJECTION, EMULSION INTRAVENOUS at 10:11

## 2024-01-01 RX ADMIN — HEPARIN SODIUM 5000 UNITS: 5000 INJECTION INTRAVENOUS; SUBCUTANEOUS at 09:12

## 2024-01-01 RX ADMIN — NOREPINEPHRINE BITARTRATE 0.2 MCG/KG/MIN: 8 INJECTION, SOLUTION INTRAVENOUS at 02:12

## 2024-01-01 RX ADMIN — FUROSEMIDE 80 MG: 10 INJECTION, SOLUTION INTRAVENOUS at 05:11

## 2024-01-01 RX ADMIN — VASOPRESSIN 0.04 UNITS/MIN: 20 INJECTION INTRAVENOUS at 04:12

## 2024-01-01 RX ADMIN — Medication 1 MG: at 03:11

## 2024-01-01 RX ADMIN — PROPOFOL 20 MCG/KG/MIN: 10 INJECTION, EMULSION INTRAVENOUS at 10:12

## 2024-01-01 RX ADMIN — Medication 0.08 MCG/KG/MIN: at 12:11

## 2024-01-01 RX ADMIN — FUROSEMIDE 80 MG: 10 INJECTION, SOLUTION INTRAVENOUS at 11:11

## 2024-01-01 RX ADMIN — IOHEXOL 15 ML: 350 INJECTION, SOLUTION INTRAVENOUS at 05:11

## 2024-01-01 RX ADMIN — PROPOFOL 20 MCG/KG/MIN: 10 INJECTION, EMULSION INTRAVENOUS at 06:11

## 2024-01-01 RX ADMIN — NOREPINEPHRINE BITARTRATE 0.32 MCG/KG/MIN: 1 INJECTION, SOLUTION, CONCENTRATE INTRAVENOUS at 03:12

## 2024-01-01 RX ADMIN — DEXTROSE MONOHYDRATE: 5 INJECTION, SOLUTION INTRAVENOUS at 10:11

## 2024-01-01 RX ADMIN — HEPARIN SODIUM 5000 UNITS: 5000 INJECTION INTRAVENOUS; SUBCUTANEOUS at 02:12

## 2024-01-01 RX ADMIN — IOHEXOL 75 ML: 350 INJECTION, SOLUTION INTRAVENOUS at 01:11

## 2024-01-01 RX ADMIN — FOLIC ACID 1 MG: 5 INJECTION, SOLUTION INTRAMUSCULAR; INTRAVENOUS; SUBCUTANEOUS at 08:11

## 2024-01-01 RX ADMIN — DAPTOMYCIN 620 MG: 350 INJECTION, POWDER, LYOPHILIZED, FOR SOLUTION INTRAVENOUS at 01:12

## 2024-01-01 RX ADMIN — ACETAMINOPHEN 1000 MG: 10 INJECTION, SOLUTION INTRAVENOUS at 03:11

## 2024-01-01 RX ADMIN — ROCURONIUM BROMIDE 80 MG: 10 INJECTION, SOLUTION INTRAVENOUS at 03:11

## 2024-01-01 RX ADMIN — Medication 0.12 MCG/KG/MIN: at 09:11

## 2024-01-01 RX ADMIN — NOREPINEPHRINE BITARTRATE 0.04 MCG/KG/MIN: 8 INJECTION, SOLUTION INTRAVENOUS at 03:12

## 2024-01-01 RX ADMIN — DEXTROSE MONOHYDRATE: 100 INJECTION, SOLUTION INTRAVENOUS at 08:12

## 2024-01-01 RX ADMIN — MICAFUNGIN SODIUM 100 MG: 100 INJECTION, POWDER, LYOPHILIZED, FOR SOLUTION INTRAVENOUS at 10:11

## 2024-01-01 RX ADMIN — FENTANYL CITRATE 50 MCG: 50 INJECTION INTRAMUSCULAR; INTRAVENOUS at 06:11

## 2024-01-01 RX ADMIN — FUROSEMIDE 40 MG: 10 INJECTION, SOLUTION INTRAVENOUS at 03:11

## 2024-01-01 RX ADMIN — Medication 250 MCG/HR: at 01:12

## 2024-01-01 RX ADMIN — DEXTROSE MONOHYDRATE: 5 INJECTION, SOLUTION INTRAVENOUS at 09:11

## 2024-01-01 RX ADMIN — PROPOFOL 1000 MG: 10 INJECTION, EMULSION INTRAVENOUS at 03:11

## 2024-01-01 RX ADMIN — Medication 50 MCG/HR: at 07:11

## 2024-01-01 RX ADMIN — PROPOFOL 50 MCG/KG/MIN: 10 INJECTION, EMULSION INTRAVENOUS at 02:11

## 2024-01-01 RX ADMIN — FOLIC ACID 1 MG: 5 INJECTION, SOLUTION INTRAMUSCULAR; INTRAVENOUS; SUBCUTANEOUS at 09:11

## 2024-01-01 RX ADMIN — PROPOFOL 50 MCG/KG/MIN: 10 INJECTION, EMULSION INTRAVENOUS at 11:12

## 2024-01-01 RX ADMIN — NOREPINEPHRINE BITARTRATE 0.17 MCG/KG/MIN: 8 INJECTION, SOLUTION INTRAVENOUS at 06:11

## 2024-01-01 RX ADMIN — Medication 100 MG: at 08:11

## 2024-01-01 RX ADMIN — VASOPRESSIN 0.04 UNITS/MIN: 20 INJECTION INTRAVENOUS at 02:12

## 2024-01-01 RX ADMIN — MICAFUNGIN SODIUM 100 MG: 100 INJECTION, POWDER, LYOPHILIZED, FOR SOLUTION INTRAVENOUS at 10:12

## 2024-01-01 RX ADMIN — HYDROMORPHONE HYDROCHLORIDE 0.5 MG: 1 INJECTION, SOLUTION INTRAMUSCULAR; INTRAVENOUS; SUBCUTANEOUS at 08:11

## 2024-01-01 RX ADMIN — HEPARIN SODIUM 5000 UNITS: 5000 INJECTION INTRAVENOUS; SUBCUTANEOUS at 03:11

## 2024-01-01 RX ADMIN — DEXTROSE, SODIUM CHLORIDE, AND POTASSIUM CHLORIDE: 5; .45; .15 INJECTION INTRAVENOUS at 03:11

## 2024-01-01 RX ADMIN — FENTANYL CITRATE 50 MCG: 50 INJECTION INTRAMUSCULAR; INTRAVENOUS at 04:11

## 2024-01-01 RX ADMIN — Medication 200 MCG/HR: at 02:12

## 2024-01-01 RX ADMIN — PROPOFOL 50 MCG/KG/MIN: 10 INJECTION, EMULSION INTRAVENOUS at 08:11

## 2024-01-01 RX ADMIN — VANCOMYCIN HYDROCHLORIDE 1500 MG: 1.5 INJECTION, POWDER, LYOPHILIZED, FOR SOLUTION INTRAVENOUS at 08:11

## 2024-01-01 RX ADMIN — HYDROMORPHONE HYDROCHLORIDE 0.5 MG: 1 INJECTION, SOLUTION INTRAMUSCULAR; INTRAVENOUS; SUBCUTANEOUS at 06:11

## 2024-11-23 PROBLEM — Z98.890 HISTORY OF HERNIA REPAIR: Status: RESOLVED | Noted: 2024-11-23 | Resolved: 2024-11-23

## 2024-11-23 PROBLEM — Z98.890 HISTORY OF HERNIA REPAIR: Status: ACTIVE | Noted: 2024-11-23

## 2024-11-23 PROBLEM — Z87.19 HISTORY OF HERNIA REPAIR: Status: ACTIVE | Noted: 2024-11-23

## 2024-11-23 PROBLEM — A41.9 SEPSIS WITHOUT ACUTE ORGAN DYSFUNCTION: Status: ACTIVE | Noted: 2024-11-23

## 2024-11-23 PROBLEM — J90 LOCULATED PLEURAL EFFUSION: Status: ACTIVE | Noted: 2024-11-23

## 2024-11-23 PROBLEM — Z87.19 HISTORY OF HERNIA REPAIR: Status: RESOLVED | Noted: 2024-11-23 | Resolved: 2024-11-23

## 2024-11-23 PROBLEM — K22.3 ESOPHAGEAL PERFORATION: Status: ACTIVE | Noted: 2024-11-23

## 2024-11-23 PROBLEM — F10.90 ALCOHOL USE DISORDER: Status: ACTIVE | Noted: 2024-11-23

## 2024-11-23 NOTE — ASSESSMENT & PLAN NOTE
Per chart review, drinks 2-3 bottles of wine and 3-4 beers per day. Last drink around 11/8. Treated with Ativan at OSH.     - Follow up liver ultrasound  - Follow up repeat CT  - Monitor for s/s of withdrawal  - Seizure precautions  - CIWA  - Continue folic acid and thiamine

## 2024-11-23 NOTE — HPI
68 yo male with alcohol use, CVA, prior cdiff 2021 and HTN admitted for HLOC for esophageal perforation. At OSH, pt was found to have pneumoperitoneum/pneumomediastinum on imaging c/f perforated viscus and loculated pleural effusion s/p robotic lap on 11/10, found to have gangrenous thoracic esophagus/hiatal hernia, esophageal ulcer perforation that was repaired with omental patch (op note significant large intraabdominal/mediastinal pus with food stuff/pills removed from mediastinum, s/p EGD with reported no evidence of intra-esophageal leak. Prior hospital course notable for shock, respiratry failure s/p intubation now extubated. Pt is currently on vancomycin, meropenem, and micafungin. CTS and GI consulted.

## 2024-11-23 NOTE — ASSESSMENT & PLAN NOTE
CVA in 2012. No residual deficits.     - Holding home plavix/ASA while determining pending surgical/endoscopic interventions   - Neuro checks

## 2024-11-23 NOTE — ASSESSMENT & PLAN NOTE
History of HTN, had stopped taking home medications about a month prior to admission at OSH.    - Holding home meds; resume as appropriate  - Maintain SBP <180

## 2024-11-23 NOTE — SUBJECTIVE & OBJECTIVE
Past Medical History:   Diagnosis Date    Arthritis     Gout     Hyperlipemia     Hypertension     Stroke 2012       Past Surgical History:   Procedure Laterality Date    HAND SURGERY      SKIN GRAFT         Review of patient's allergies indicates:  No Known Allergies    Family History       Problem Relation (Age of Onset)    Hypertension Father    No Known Problems Mother          Tobacco Use    Smoking status: Never    Smokeless tobacco: Never   Substance and Sexual Activity    Alcohol use: Yes    Drug use: Yes     Types: Marijuana    Sexual activity: Yes      Review of Systems   Unable to perform ROS: Other (Confused)     Objective:     Vital Signs (Most Recent):    Vital Signs (24h Range):  Temp:  [99.3 °F (37.4 °C)] 99.3 °F (37.4 °C)  Pulse:  [94] 94  Resp:  [28] 28  SpO2:  [94 %] 94 %  BP: (124)/(82) 124/82      There is no height or weight on file to calculate BMI.    No intake or output data in the 24 hours ending 11/23/24 0501       Physical Exam  Vitals and nursing note reviewed.   Constitutional:       General: He is awake.      Appearance: He is ill-appearing.   HENT:      Mouth/Throat:      Mouth: Mucous membranes are dry.      Pharynx: Oropharynx is clear.   Eyes:      Extraocular Movements: Extraocular movements intact.      Pupils: Pupils are equal, round, and reactive to light.   Cardiovascular:      Rate and Rhythm: Normal rate and regular rhythm.      Pulses: Normal pulses.      Heart sounds: Normal heart sounds.   Pulmonary:      Breath sounds: Examination of the right-lower field reveals rales. Examination of the left-lower field reveals rales. Rales present.      Comments: Bilateral chest tubes  Abdominal:      Palpations: Abdomen is soft.      Comments: Bilateral abd KENNY drains   Genitourinary:     Comments: Johansen  Musculoskeletal:         General: Normal range of motion.   Skin:     General: Skin is warm and dry.      Capillary Refill: Capillary refill takes less than 2 seconds.      Findings:  "Bruising present.   Neurological:      General: No focal deficit present.      Mental Status: He is alert and easily aroused. He is disoriented.      GCS: GCS eye subscore is 4. GCS verbal subscore is 4. GCS motor subscore is 6.   Psychiatric:         Attention and Perception: Attention normal.         Behavior: Behavior is cooperative.            Vents:     Lines/Drains/Airways       None                 Significant Labs:    CBC/Anemia Profile:  No results for input(s): "WBC", "HGB", "HCT", "PLT", "MCV", "RDW", "IRON", "FERRITIN", "RETIC", "FOLATE", "[id number]", "OCCULTBLOOD" in the last 48 hours.     Chemistries:  No results for input(s): "NA", "K", "CL", "CO2", "BUN", "CREATININE", "CALCIUM", "ALBUMIN", "PROT", "BILITOT", "ALKPHOS", "ALT", "AST", "GLUCOSE", "MG", "PHOS" in the last 48 hours.    All pertinent labs within the past 24 hours have been reviewed.    Significant Imaging: I have reviewed all pertinent imaging results/findings within the past 24 hours.  "

## 2024-11-23 NOTE — HPI
Mr. Hernández is a 69yoM with a PMHx of alcohol use disorder, chronic back pain 2/2 to spinal stenosis, HLD, HTN, and CVA (on plavix) with no residual deficits who presented to OSH on 11/10/24 with epigastric pain and melena; found to have an esophageal perforation. The patient was initally hypotensive, anemic to 5.7, hyperbilirubinemia, lactic acidosis to 9.9. CTAP showed a pneumoperitoneum and pneumomediastinum consistent with a perforated viscus, hiatal hernia with surrounding external gas, and loculated pleural effusions. He was transfused and started on octreotide gtt, protonix, ativan, doxy, zosyn, hannah, vanc, precedex, with AC held. For the loculated pleural effusions, bilateral chest tubes were placed. Diagnostic laparoscopy showed hiatal hernia with gangrene in the lower thoracic esophagus and a perforated esophageal ulcer in the lower thoracic area, and mediastinal fluid pus, food, and pills. He underwent surgical repair on 11/10/24. Additionally, he was found to have HFrEF (EF = 20%).The patient was later extubated but then developed AMS. They repeated imaging with extraluminal air adjacent to the GE junction with increased fluid in the lower posterior mediastium concerning for recurrent esophageal perforation.   On 11/23/24, the patient was transferred to [facility] for AES and CTS consults s/t concern for perforated esophagus and loculated pleural effusions    Per chart review from OSH the patient stopped taking his medications a month before presentation because he thought they were making him weak. Other ROS positive for paleness for 3 weeks prior to presentation. The patient provided supplemental history and reported that the patient was not eating or drinking except EtOH, drinks 2-3 bottles of wine and 2-4 beers per day.

## 2024-11-23 NOTE — H&P
[facility] - Medical ICU  Critical Care Medicine  History & Physical    Patient Name: Jose Hernández Sr.  MRN: [id number]  Admission Date: 11/23/2024  Hospital Length of Stay: 0 days  Code Status: Full Code  Attending Physician: Alma Rosa Breaux MD   Primary Care Provider: Tong Cantrell MD   Principal Problem: Esophageal perforation    Subjective:     HPI:  Mr. Hernández is a 69yoM with a PMHx of alcohol use disorder, chronic back pain 2/2 to spinal stenosis, HLD, HTN, and CVA (plavix) with no residual deficits who presented to OSH on 11/10 for epigastric pain and melena found to have an esophageal perforation. The patient was transferred for AES ans CTS evaluation for worsening esophageal perforation. Per chart review from OSH the patient stopped taking his medications a month before presentation because he thought they were making him weak. Other ROS positive for paleness for 3 weeks prior to presentation. The patient provided supplemental history and reported that the patient was not eating or drinking except EtOH, drinks 2-3 bottles of wine and 2-4 beers per day.     At the OSH the patient was initally hypotensive, anemic to 5.7, hyperbilirubinemia, lactic acidosis to 9.9. CTAP showed a pneumoperitoneum and pneumomediastinum consistent with a perforated viscus, hiatal hernia with surrounding external gas, and loculated pleural effusions. He was transfused and started on octreotide gtt, protonix, ativan, doxy, zosyn, hannah, vanc, precedex, with AC held. For the loculated pleural effusions, bilateral chest tubes were placed. Diagnostic laparoscopy showed hiatal hernia with gangrene in the lower thoracic esophagus and a perforated esophageal ulcer in the lower thoracic area, and mediastinal fluid pus, food, and pills. He underwent surgical repair on 11/10/24. The patient was then intubated and was found to have HFrEF (EF = 20%). The patient was later extubated but then developed AMS. They repeated imaging with  extraluminal air adjacent to the GE junction with increased fluid in the lower posterior mediastium concerning for recurrent esophageal perforation.     Admitted to the MICU for AES and CTS consults s/t concern for perforated esophagus and loculated pleural effusions.    Hospital/ICU Course:  No notes on file     Past Medical History:   Diagnosis Date    Arthritis     Gout     Hyperlipemia     Hypertension     Stroke 2012       Past Surgical History:   Procedure Laterality Date    HAND SURGERY      SKIN GRAFT         Review of patient's allergies indicates:  No Known Allergies    Family History       Problem Relation (Age of Onset)    Hypertension Father    No Known Problems Mother          Tobacco Use    Smoking status: Never    Smokeless tobacco: Never   Substance and Sexual Activity    Alcohol use: Yes    Drug use: Yes     Types: Marijuana    Sexual activity: Yes      Review of Systems   Unable to perform ROS: Other (Confused)     Objective:     Vital Signs (Most Recent):    Vital Signs (24h Range):  Temp:  [99.3 °F (37.4 °C)] 99.3 °F (37.4 °C)  Pulse:  [94] 94  Resp:  [28] 28  SpO2:  [94 %] 94 %  BP: (124)/(82) 124/82      There is no height or weight on file to calculate BMI.    No intake or output data in the 24 hours ending 11/23/24 0507       Physical Exam  Vitals and nursing note reviewed.   Constitutional:       General: He is awake.      Appearance: He is ill-appearing.   HENT:      Mouth/Throat:      Mouth: Mucous membranes are dry.      Pharynx: Oropharynx is clear.   Eyes:      Extraocular Movements: Extraocular movements intact.      Pupils: Pupils are equal, round, and reactive to light.   Cardiovascular:      Rate and Rhythm: Normal rate and regular rhythm.      Pulses: Normal pulses.      Heart sounds: Normal heart sounds.   Pulmonary:      Breath sounds: Examination of the right-lower field reveals rales. Examination of the left-lower field reveals rales. Rales present.      Comments: Bilateral  "chest tubes  Abdominal:      Palpations: Abdomen is soft.      Comments: Bilateral abd KENNY drains   Genitourinary:     Comments: Johansen  Musculoskeletal:         General: Normal range of motion.   Skin:     General: Skin is warm and dry.      Capillary Refill: Capillary refill takes less than 2 seconds.      Findings: Bruising present.   Neurological:      General: No focal deficit present.      Mental Status: He is alert and easily aroused. He is disoriented.      GCS: GCS eye subscore is 4. GCS verbal subscore is 4. GCS motor subscore is 6.   Psychiatric:         Attention and Perception: Attention normal.         Behavior: Behavior is cooperative.            Vents:     Lines/Drains/Airways       None                 Significant Labs:    CBC/Anemia Profile:  No results for input(s): "WBC", "HGB", "HCT", "PLT", "MCV", "RDW", "IRON", "FERRITIN", "RETIC", "FOLATE", "[id number]", "OCCULTBLOOD" in the last 48 hours.     Chemistries:  No results for input(s): "NA", "K", "CL", "CO2", "BUN", "CREATININE", "CALCIUM", "ALBUMIN", "PROT", "BILITOT", "ALKPHOS", "ALT", "AST", "GLUCOSE", "MG", "PHOS" in the last 48 hours.    All pertinent labs within the past 24 hours have been reviewed.    Significant Imaging: I have reviewed all pertinent imaging results/findings within the past 24 hours.  Assessment/Plan:     Neuro  History of CVA (cerebrovascular accident)  CVA in 2012. No residual deficits.     - Holding home plavix/ASA while determining pending surgical/endoscopic interventions   - Neuro checks    Psychiatric  Alcohol use disorder  Per chart review, drinks 2-3 bottles of wine and 3-4 beers per day. Last drink around 11/8. Treated with Ativan at OSH.     - Follow up liver ultrasound  - Follow up repeat CT  - Monitor for s/s of withdrawal   - Seizure precautions  - CIWA  - Continue folic acid and thiamine    Pulmonary  Loculated pleural effusion  CT scans concerning for loculated pleural effusions; bilateral large-bore chest " tubes inserted prior to transfer. Draining to suction.    - CTS consulted; appreciate recs  - Follow up repeat CT scan  - Maintain spO2 >90%    Cardiac/Vascular  Hyperlipidemia  Takes statin at home; holding in the setting of previous transaminitis.    - Daily CMP  - Resume medications as appropriate    Essential hypertension  History of HTN, had stopped taking home medications about a month prior to admission at OSH.    - Holding home meds; resume as appropriate  - Maintain SBP <180    ID  Sepsis without acute organ dysfunction  CT concerning for for multifocal PNA. Covered with Kade/Apple/Vanc.    - ID consulted; appreciate recs  - Continue BSA; Kade/Apple/Vanc  - Daily CBC    GI  * Esophageal perforation  Mr. Hernández is a 69yoM with PMHx significant for alcohol use disorder, chronic back pain 2/2 to spinal stenosis, HLD, HTN, and CVA (plavix) with no residual deficits who presented to OSH on 11/10 for epigastric pain and melena found to have an esophageal perforation.     CT abdomen showed evidence of pneumomediastinum, pneumoperitoneum all concerning for perforated viscus. Patient received 3 units of packed red blood cells along with Protonix and Zosyn. General surgery consulted. Patient taken to surgery. Robotic assisted laparoscopy performed. 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 Sicard). General surgery performed EGD immediately following laparoscopy. He reports no evidence of intra-esophageal leakage. Bilateral KENNY drains placed.    Pt. Transferred to [facility] for HLOC and AES/CTS consults.    - CTS consulted; appreciate recs  - AES consulted; appreciate recs  - Follow up repeat CT scan  - Continue protonix BID  - Monitor for bleeding; trend CBCs  - Transfuse for hgb <7  - Monitor KENNY drains        Critical Care Daily Checklist:    A: Awake: RASS Goal/Actual Goal:    Actual:     B: Spontaneous Breathing Trial Performed?      C: SAT & SBT Coordinated?  N/A                      D: Delirium: CAM-ICU     E: Early Mobility Performed? No   F: Feeding Goal:    Status:     Current Diet Order   Procedures    Diet NPO      AS: Analgesia/Sedation PRN   T: Thromboembolic Prophylaxis Holding in the setting of possible GI bleed/interventions   H: HOB > 300 Yes   U: Stress Ulcer Prophylaxis (if needed) PPI   G: Glucose Control 140-180   B: Bowel Function Stool Occurrence: 1   I: Indwelling Catheter (Lines & Douglass) Necessity R. IJ TLC, KENNY drain x2, chest tube x2, douglass, NG tube, PIV   D: De-escalation of Antimicrobials/Pharmacotherapies N/A - continue wilda/hannah/vanc    Plan for the day/ETD AES/CTS/ID consults    Code Status:  Family/Goals of Care: Full Code     Critical Care Time: 70 minutes  Critical secondary to esophageal perforation, loculated pleural effusions requiring chest tubes, PNA.    Discussed case with CCM fellow. Full attestation from CCM attending to follow.    Critical care was time spent personally by me on the following activities: development of treatment plan with patient or surrogate and bedside caregivers, discussions with consultants, evaluation of patient's response to treatment, examination of patient, ordering and performing treatments and interventions, ordering and review of laboratory studies, ordering and review of radiographic studies, pulse oximetry, re-evaluation of patient's condition. This critical care time did not overlap with that of any other provider or involve time for any procedures.     Lavern Moreno, [facility]-BC  Critical Care Medicine  [facility] - Medical ICU

## 2024-11-23 NOTE — ASSESSMENT & PLAN NOTE
Mr. Hernández is a 69yoM with PMHx significant for alcohol use disorder, chronic back pain 2/2 to spinal stenosis, HLD, HTN, and CVA (plavix) with no residual deficits who presented to OSH on 11/10 for epigastric pain and melena found to have an esophageal perforation.     CT abdomen showed evidence of pneumomediastinum, pneumoperitoneum all concerning for perforated viscus. Patient received 3 units of packed red blood cells along with Protonix and Zosyn. General surgery consulted. Patient taken to surgery. Robotic assisted laparoscopy performed. 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 Sicard). General surgery performed EGD immediately following laparoscopy. He reports no evidence of intra-esophageal leakage. Bilateral KENNY drains placed.    Pt. Transferred to [facility] for HLOC and AES/CTS consults.    - CTS consulted; appreciate recs  - AES consulted; appreciate recs  - Continue protonix BID  - Monitor for bleeding; trend CBCs  - Transfuse for hgb <7  - Monitor KENNY drains

## 2024-11-23 NOTE — PROVIDER TRANSFER
Outside Transfer Acceptance Note / Regional Referral Center    Referring facility: [facility]   Referring provider: SALTY GRIMES  Accepting facility: [facility]  Accepting provider: MICHELINE KILLIAN AKACHI  Admitting provider: Yaima  Reason for transfer: Higher Level of Care   Transfer diagnosis: loculated pleural fluid collections, with numerous pockets of air and recurrent esophageal or GE junction perforation  Transfer specialty requested: Cardiothoracic Surgery  Transfer specialty notified: Yes  Transfer level: NUMBER 1-5: 2  Bed type requested: ICU  Isolation status: No active isolations   Admission class or status: IP- Inpatient      Narrative     November 10, 2024  Patient is a 69-year-old male with a past medical history of CHF (EF 20%), chronic NSAID usage, alcohol dependence (3 bottles of wine and 4 cans of beer daily), spinal stenosis, gout, dyslipidemia, hypertension, CVA that was admitted to [facility] on 11/10/2024 because of epigastric pain.  There was associated nausea vomiting diarrhea and black tarry stools.  Patient septic on presentation.  Blood pressure 70s/40s, pulse 110s, lactic 9.9.  Patient anemic with a hemoglobin of 5.7 on presentation.  CT abdomen showed evidence of pneumomediastinum, pneumoperitoneum all concerning for perforated viscus.  There is also evidence of bibasilar pneumonia.  On presentation, patient received 3 units of packed red blood cells along with Protonix and Zosyn.  General surgery consulted.  Patient taken to surgery.  Robotic assisted laparoscopy performed.  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 Sicard).  General surgery performed EGD immediately following laparoscopy.  He reports no evidence of intra-esophageal leakage.  Unfortunately, following procedure, patient went into acute hemorrhagic shock, acute hypoxic  respiratory failure secondary to bibasilar consolidation so patient was admitted to the ICU.  He remained intubated because of respiratory failure. Patient started on IV fluids, pressors and antibiotics.    November 16, 2024  -   Repeat CT abdomen and pelvis on November 16, 2024 shows evidence of:   1. Free air superior to the lesser curvature of the stomach  2. Large right pleural fluid s/p bilateral thoracostomy tube placement  3.  Bilateral consolidation concerning for pneumonia    November 22, 2024-  CT thorax shows  1.  Bilateral pulmonary infiltrates concerning for pneumonia   2.  Right chest wall subcutaneous emphysema  3.  Extraluminal air adjacent to the GE junction worrisome for esophageal perforation vs. GE junction perforation  4.  Esophageal distention with gas  5.  Mediastinal fluid and gas present and increasing in size    Ultrasound abdomen shows cholelithiasis.  White count 24.  Hemoglobin 8.  ABG shows no significant abnormalities.  Patient started on meropenem and vancomycin. Repeat lab work shows elevated ESR and CRP.  CMP significant for potassium low 3.0 and creatinine 1.67 concerning for ATN.    Dr. Barajas, Intensivist at [facility] states that the patient is now extubated.  Patient not tolerating NG tube feedings presumably because of esophageal pathology. Patient is vitally stable not on pressors.  Dr. Barajas discussed case with Dr. Goldberg, CT surgery who did not recommend surgery given patient's low ejection fraction.  Instead, recommends transfer to higher level care for IR and GI consultation.  Case dw Dr. Balbuena, GI, who agrees to consult once patient arrives at [facility]. Dr. Greer, ICU also agrees to consult.  I agreed to accept patient to [facility].         Objective     Vitals:    Recent Labs: All pertinent labs within the past 24 hours have been reviewed.  Recent imaging: see chart   Airway:     Vent settings:         IV access:    Infusions: see chart  Allergies: Review of  patient's allergies indicates:  No Known Allergies   NPO: No    Anticoagulation:   Anticoagulants       None             Instructions      [facility]-  Admit to Hospital Medicine  Upon patient arrival to floor, please send SecureChat to [facility] or call extension [phone] (if no answer, do NOT leave a callback number after the beep, rather please send a SecureChat to [facility]), for Hospital Medicine admit team assignment and for additional admit orders for the patient.  Do not page the attending physician associated with the patient on arrival (this physician may not be on duty at the time of arrival).  Rather, always send a SecureChat to [facility] or call [phone] to reach the triage physician for orders and team assignment.

## 2024-11-23 NOTE — PROGRESS NOTES
Pharmacokinetic Initial Assessment: IV Vancomycin    Assessment/Plan:    Initiate intravenous vancomycin with loading dose of 1500 mg once followed by a maintenance dose of vancomycin 1000mg IV every 24 hours  Desired empiric serum trough concentration is 10 to 20 mcg/mL  Draw vancomycin trough level 60 min prior to third dose on 11/25/24 at approximately 0700  Pharmacy will continue to follow and monitor vancomycin.      Please contact pharmacy at extension  [phone] with any questions regarding this assessment.     Thank you for the consult,   Nathan Easton, PharmD       Patient brief summary:  Jose Hernández Sr. is a 69 y.o. male initiated on antimicrobial therapy with IV Vancomycin for treatment of suspected intra-abdominal infection    Drug Allergies:   Review of patient's allergies indicates:  No Known Allergies    Actual Body Weight:   78kg    Renal Function:   CrCl cannot be calculated (Patient's most recent lab result is older than the maximum 7 days allowed.).,     Dialysis Method (if applicable):  N/A

## 2024-11-23 NOTE — ASSESSMENT & PLAN NOTE
CT concerning for for multifocal PNA. Covered with Kade/Apple/Vanc.    - ID consulted; appreciate recs  - Continue BSA; Kade/Apple/Vanc  - Daily CBC

## 2024-11-23 NOTE — HPI
Mr. Hernández is a 69 yoM with PMH significant for chronic alcohol abuse and NSAID use who suffered a perforated esophageal ulcer and underwent promary repair with bilateral chest tube placement and bilateral intraabdominal drain placement on 11/10 at an [facility]. He was transferred here for concern of persistent leak and potential AES, IR, and CTS intervention. Of note, his EF was found to be 20% on [facility] echo. He was transferred to the MICU.

## 2024-11-23 NOTE — SUBJECTIVE & OBJECTIVE
Past Medical History:   Diagnosis Date    Arthritis     Gout     Hyperlipemia     Hypertension     Stroke 2012       Past Surgical History:   Procedure Laterality Date    HAND SURGERY      SKIN GRAFT         Review of patient's allergies indicates:  No Known Allergies    Medications:  Medications Prior to Admission   Medication Sig    acetaminophen (TYLENOL) 650 MG TbSR Take 1 tablet by mouth once daily.    allopurinoL (ZYLOPRIM) 100 MG tablet Take 100 mg by mouth once daily.    amLODIPine (NORVASC) 10 MG tablet Take 1 tablet by mouth once daily.    aspirin (ECOTRIN) 81 MG EC tablet Take 1 tablet (81 mg total) by mouth once daily.    atorvastatin (LIPITOR) 80 MG tablet Take 1 tablet (80 mg total) by mouth once daily.    cholecalciferol, vitamin D3, 125 mcg (5,000 unit) Tab Take 1 tablet by mouth once daily.    clopidogreL (PLAVIX) 75 mg tablet Take 1 capsule orally once a day.    cyanocobalamin 500 MCG tablet Take 1 tablet by mouth 2 (two) times daily.    diclofenac sodium (VOLTAREN) 1 % Gel Apply 2 g topically once daily.    DULoxetine (CYMBALTA) 30 MG capsule Take 30 mg by mouth once daily.    gabapentin (NEURONTIN) 800 MG tablet Take 1 tablet (800 mg total) by mouth once daily.    multivitamin capsule Take 1 capsule by mouth once daily.    pantoprazole (PROTONIX) 40 MG tablet Take 40 mg by mouth.    phenazopyridine HCl (URISTAT ORAL) Take by mouth.    pumpkin seed extract/soy germ (AZO BLADDER CONTROL ORAL) Take by mouth.    tamsulosin (FLOMAX) 0.4 mg Cap Take 1 capsule (0.4 mg total) by mouth once daily.    tiZANidine (ZANAFLEX) 4 MG tablet Take 4 mg by mouth.    traZODone (DESYREL) 50 MG tablet Take 1 tablet (50 mg total) by mouth every evening.    zolpidem (AMBIEN) 10 mg Tab Take 1 tablet (10 mg total) by mouth once daily.     Antibiotics (From admission, onward)      Start     Stop Route Frequency Ordered    11/23/24 0900  meropenem 2 g in 0.9% NaCl 100 mL IVPB (MB+)         -- IV Every 12 hours (non-standard  "times) 11/23/24 0547    11/23/24 0800  vancomycin 1,500 mg in 0.9% NaCl 250 mL IVPB (admixture device)         -- IV Once 11/23/24 0631    11/23/24 0800  vancomycin (VANCOCIN) 1,000 mg in 0.9% NaCl 250 mL IVPB (admixture device)         -- IV Every 24 hours (non-standard times) 11/23/24 0631    11/23/24 0521  vancomycin - pharmacy to dose  (vancomycin IVPB (PEDS and ADULTS))        Placed in "And" Linked Group    -- IV pharmacy to manage frequency 11/23/24 0521          Antifungals (From admission, onward)      Start     Stop Route Frequency Ordered    11/24/24 0930  micafungin 100 mg in 0.9% NaCl 100 mL IVPB (MB+)         -- IV Every 24 hours (non-standard times) 11/23/24 0547          Antivirals (From admission, onward)      None             Immunization History   Administered Date(s) Administered    COVID-19 Vaccine 10/01/2021    Hepatitis B, Adult 10/11/2005    Pneumococcal Polysaccharide - 23 Valent 09/29/2021    Td (ADULT) 09/29/2021       Family History       Problem Relation (Age of Onset)    Hypertension Father    No Known Problems Mother          Social History     Socioeconomic History    Marital status:     Number of children: 3   Tobacco Use    Smoking status: Never    Smokeless tobacco: Never   Substance and Sexual Activity    Alcohol use: Yes    Drug use: Yes     Types: Marijuana    Sexual activity: Yes     Social Drivers of Health     Financial Resource Strain: Medium Risk (6/29/2023)    Received from [employer] [facility] and Its Subsidiaries and Affiliates    Overall Financial Resource Strain (CARDIA)     Difficulty of Paying Living Expenses: Somewhat hard   Food Insecurity: Food Insecurity Present (6/29/2023)    Received from [employer] [facility] and Its Subsidiaries and Affiliates    Hunger Vital Sign     Worried About Running Out of Food in the Last Year: Sometimes true     Ran Out of Food in the Last Year: Sometimes true "   Transportation Needs: No Transportation Needs (6/29/2023)    Received from [facility] and [facility]iliates    PRAPARE - Transportation     Lack of Transportation (Medical): No     Lack of Transportation (Non-Medical): No   Physical Activity: Inactive (6/29/2023)    [facility]    Exercise Vital Sign     Days of Exercise per Week: 0 days     Minutes of Exercise per Session: 0 min   Stress: No Stress Concern Present (6/29/2023)    Received from [facility]    [employer] [place] [employer] - Occupational Stress Questionnaire     Feeling of Stress : Only a little   Housing Stability: Low Risk  (6/29/2023)    Received from [facility] and [facility]iliates    Housing Stability Vital Sign     Unable to Pay for Housing in the Last Year: No     Number of Places Lived in the Last Year: 1     Unstable Housing in the Last Year: No     Review of Systems   Unable to perform ROS: Acuity of condition     Objective:     Vital Signs (Most Recent):  Temp: 99.4 °F (37.4 °C) (11/23/24 0701)  Pulse: 94 (11/23/24 0800)  Resp: (!) 28 (11/23/24 0800)  BP: 125/84 (11/23/24 0800)  SpO2: 95 % (11/23/24 0800) Vital Signs (24h Range):  Temp:  [98.4 °F (36.9 °C)-99.4 °F (37.4 °C)] 99.4 °F (37.4 °C)  Pulse:  [89-94] 94  Resp:  [22-35] 28  SpO2:  [93 %-95 %] 95 %  BP: (124-143)/(82-91) 125/84     Weight: 78 kg (171 lb 15.3 oz)  Body mass index is 27.75 kg/m².    Estimated Creatinine Clearance: 45.7 mL/min (A) (based on SCr of 1.5 mg/dL (H)).     Physical Exam  Constitutional:       General: He is not in acute distress.     Appearance: He is ill-appearing.   HENT:      Nose:      Comments: NGT  Pulmonary:      Effort: Pulmonary effort is normal. No respiratory distress.       Comments: Brant chest tubes  Abdominal:      Palpations: Abdomen is soft.      Tenderness: There is no abdominal tenderness.      Comments: Brant abdominal drains - ss fluid   Genitourinary:     Comments: douglass  Musculoskeletal:      Right lower leg: No edema.      Left lower leg: No edema.   Skin:     General: Skin is warm and dry.      Comments: RIJ  R chest abrasions     Neurological:      Mental Status: He is disoriented.          Significant Labs:   Microbiology Results (last 7 days)       ** No results found for the last 168 hours. **            Significant Imaging: I have reviewed all pertinent imaging results/findings within the past 24 hours.

## 2024-11-23 NOTE — CONSULTS
[facility] - Medical ICU  Thoracic Surgery  Consult Note    Patient Name: Jose Hernández Sr.  MRN: [id number]  Code Status: Full Code  Admission Date: 11/23/2024  Hospital Length of Stay: 0 days  Consult Requesting Physician: Saeid  Consulting Physician: Ashlyn  Primary Care Provider: Tong Cantrell MD    Inpatient consult to Cardiothoracic Surgery  Consult performed by: Ellis Barrientos MD  Consult ordered by: Lavern Moreno, AGACNP-BC        Subjective:     Reason for Consult: Esophageal perforation    History of Present Illness: Mr. Hernández is a 69 yoM with PMH significant for chronic alcohol abuse and NSAID use who suffered a perforated esophageal ulcer and underwent promary repair with bilateral chest tube placement and bilateral intraabdominal drain placement on 11/10 at an [facility]. He was transferred here for concern of persistent leak and potential AES, IR, and CTS intervention. Of note, his EF was found to be 20% on [facility] echo. He was transferred to the MICU.     No current facility-administered medications on file prior to encounter.     Current Outpatient Medications on File Prior to Encounter   Medication Sig    acetaminophen (TYLENOL) 650 MG TbSR Take 1 tablet by mouth once daily.    allopurinoL (ZYLOPRIM) 100 MG tablet Take 100 mg by mouth once daily.    amLODIPine (NORVASC) 10 MG tablet Take 1 tablet by mouth once daily.    aspirin (ECOTRIN) 81 MG EC tablet Take 1 tablet (81 mg total) by mouth once daily.    atorvastatin (LIPITOR) 80 MG tablet Take 1 tablet (80 mg total) by mouth once daily.    cholecalciferol, vitamin D3, 125 mcg (5,000 unit) Tab Take 1 tablet by mouth once daily.    clopidogreL (PLAVIX) 75 mg tablet Take 1 capsule orally once a day.    cyanocobalamin 500 MCG tablet Take 1 tablet by mouth 2 (two) times daily.    diclofenac sodium (VOLTAREN) 1 % Gel Apply 2 g topically once daily.    DULoxetine (CYMBALTA) 30 MG capsule Take 30 mg by mouth once daily.    gabapentin (NEURONTIN) 800 MG  tablet Take 1 tablet (800 mg total) by mouth once daily.    multivitamin capsule Take 1 capsule by mouth once daily.    pantoprazole (PROTONIX) 40 MG tablet Take 40 mg by mouth.    phenazopyridine HCl (URISTAT ORAL) Take by mouth.    pumpkin seed extract/soy germ (AZO BLADDER CONTROL ORAL) Take by mouth.    tamsulosin (FLOMAX) 0.4 mg Cap Take 1 capsule (0.4 mg total) by mouth once daily.    tiZANidine (ZANAFLEX) 4 MG tablet Take 4 mg by mouth.    traZODone (DESYREL) 50 MG tablet Take 1 tablet (50 mg total) by mouth every evening.    zolpidem (AMBIEN) 10 mg Tab Take 1 tablet (10 mg total) by mouth once daily.       Review of patient's allergies indicates:  No Known Allergies    Past Medical History:   Diagnosis Date    Arthritis     Gout     Hyperlipemia     Hypertension     Stroke 2012     Past Surgical History:   Procedure Laterality Date    HAND SURGERY      SKIN GRAFT       Family History       Problem Relation (Age of Onset)    Hypertension Father    No Known Problems Mother          Tobacco Use    Smoking status: Never    Smokeless tobacco: Never   Substance and Sexual Activity    Alcohol use: Yes    Drug use: Yes     Types: Marijuana    Sexual activity: Yes     Review of Systems   Unable to perform ROS: Mental status change     Objective:     Vital Signs (Most Recent):  Temp: 99.4 °F (37.4 °C) (11/23/24 0701)  Pulse: 89 (11/23/24 1026)  Resp: (!) 33 (11/23/24 0900)  BP: (!) 147/92 (11/23/24 0900)  SpO2: 95 % (11/23/24 0900) Vital Signs (24h Range):  Temp:  [98.4 °F (36.9 °C)-99.4 °F (37.4 °C)] 99.4 °F (37.4 °C)  Pulse:  [89-94] 89  Resp:  [22-35] 33  SpO2:  [93 %-95 %] 95 %  BP: (124-147)/(82-92) 147/92     Weight: 78 kg (171 lb 15.3 oz)  Body mass index is 27.75 kg/m².  ECOG Performance Status Grade: 2 - Ambulates, capable of self care only    Intake/Output - Last 3 Shifts         11/21 0700 11/22 0659 11/22 0700 11/23 0659 11/23 0700 11/24 0659    IV Piggyback   73.4    Total Intake(mL/kg)   73.4 (0.9)     Urine (mL/kg/hr)  120 170 (0.6)    Drains  60     Stool  0     Total Output  180 170    Net  -180 -96.6           Stool Occurrence  1 x             SpO2: 95 %        Physical Exam  Vitals reviewed.   Constitutional:       Appearance: He is ill-appearing.   Cardiovascular:      Rate and Rhythm: Normal rate and regular rhythm.   Pulmonary:      Effort: Pulmonary effort is normal. No respiratory distress.      Comments: 5 L NC. Bilateral chest tubes. No air leak. Minimal tidaling with stripping.   Abdominal:      Palpations: Abdomen is soft.      Tenderness: There is no abdominal tenderness.      Comments: Port site incision healing well. Bilateral KENNY drains with small volume serous output.   Skin:     General: Skin is warm.   Neurological:      General: No focal deficit present.      Mental Status: He is disoriented.            Significant Labs:  CBC:   Recent Labs   Lab 11/23/24  0530   WBC 19.34*   RBC 3.21*   HGB 8.4*   HCT 26.5*      MCV 83   MCH 26.2*   MCHC 31.7*     CMP:   Recent Labs   Lab 11/23/24  0530   *   CALCIUM 8.6*   ALBUMIN 2.1*   PROT 5.7*   *   K 3.4*   CO2 25   *   BUN 31*   CREATININE 1.5*   ALKPHOS 137   ALT 26   AST 71*   BILITOT 2.8*       Significant Diagnostics:  I have reviewed all pertinent imaging results/findings within the past 24 hours.    VTE Risk Mitigation (From admission, onward)      None            Assessment/Plan:     * Esophageal perforation  69 yoM s/p primary repair [facility] perforation at [facility]10. Transferred for concern of ongoing leak. No access to outside imaging at this time. Overall clinically stable in the MICU. Not on pressors. Minimal respiratory support. Case discussed with Dr. Goldberg and reviewed with family. We will need to obtain imaging to rule out ongoing esophageal leak, and potential un drained pleural fluid. Patient is quite frail with poor cardiac function and would not tolerate surgical intervention well, therefore  minimally invasive procedures would be preferable.    - CT with oral contrast to evaluate for esophageal leak  - AES consulted, appreciate their help if stent is needed  - If concern for un drained pleural fluid, recommend IR drain placement  - Care per MICU  - Thoracic surgery will follow        Thank you for your consult. I will follow-up with patient. Please contact us if you have any additional questions.     Ellis Barrientos MD  Thoracic Surgery  [facility] - Medical ICU

## 2024-11-23 NOTE — ASSESSMENT & PLAN NOTE
CT scans concerning for loculated pleural effusions; bilateral large-bore chest tubes inserted prior to transfer. Draining to suction.    - CTS consulted; appreciate recs  - Follow up repeat CT scan  - Maintain spO2 >90%

## 2024-11-23 NOTE — SUBJECTIVE & OBJECTIVE
No current facility-administered medications on file prior to encounter.     Current Outpatient Medications on File Prior to Encounter   Medication Sig    acetaminophen (TYLENOL) 650 MG TbSR Take 1 tablet by mouth once daily.    allopurinoL (ZYLOPRIM) 100 MG tablet Take 100 mg by mouth once daily.    amLODIPine (NORVASC) 10 MG tablet Take 1 tablet by mouth once daily.    aspirin (ECOTRIN) 81 MG EC tablet Take 1 tablet (81 mg total) by mouth once daily.    atorvastatin (LIPITOR) 80 MG tablet Take 1 tablet (80 mg total) by mouth once daily.    cholecalciferol, vitamin D3, 125 mcg (5,000 unit) Tab Take 1 tablet by mouth once daily.    clopidogreL (PLAVIX) 75 mg tablet Take 1 capsule orally once a day.    cyanocobalamin 500 MCG tablet Take 1 tablet by mouth 2 (two) times daily.    diclofenac sodium (VOLTAREN) 1 % Gel Apply 2 g topically once daily.    DULoxetine (CYMBALTA) 30 MG capsule Take 30 mg by mouth once daily.    gabapentin (NEURONTIN) 800 MG tablet Take 1 tablet (800 mg total) by mouth once daily.    multivitamin capsule Take 1 capsule by mouth once daily.    pantoprazole (PROTONIX) 40 MG tablet Take 40 mg by mouth.    phenazopyridine HCl (URISTAT ORAL) Take by mouth.    pumpkin seed extract/soy germ (AZO BLADDER CONTROL ORAL) Take by mouth.    tamsulosin (FLOMAX) 0.4 mg Cap Take 1 capsule (0.4 mg total) by mouth once daily.    tiZANidine (ZANAFLEX) 4 MG tablet Take 4 mg by mouth.    traZODone (DESYREL) 50 MG tablet Take 1 tablet (50 mg total) by mouth every evening.    zolpidem (AMBIEN) 10 mg Tab Take 1 tablet (10 mg total) by mouth once daily.       Review of patient's allergies indicates:  No Known Allergies    Past Medical History:   Diagnosis Date    Arthritis     Gout     Hyperlipemia     Hypertension     Stroke 2012     Past Surgical History:   Procedure Laterality Date    HAND SURGERY      SKIN GRAFT       Family History       Problem Relation (Age of Onset)    Hypertension Father    No Known Problems  Mother          Tobacco Use    Smoking status: Never    Smokeless tobacco: Never   Substance and Sexual Activity    Alcohol use: Yes    Drug use: Yes     Types: Marijuana    Sexual activity: Yes     Review of Systems   Unable to perform ROS: Mental status change     Objective:     Vital Signs (Most Recent):  Temp: 99.4 °F (37.4 °C) (11/23/24 0701)  Pulse: 89 (11/23/24 1026)  Resp: (!) 33 (11/23/24 0900)  BP: (!) 147/92 (11/23/24 0900)  SpO2: 95 % (11/23/24 0900) Vital Signs (24h Range):  Temp:  [98.4 °F (36.9 °C)-99.4 °F (37.4 °C)] 99.4 °F (37.4 °C)  Pulse:  [89-94] 89  Resp:  [22-35] 33  SpO2:  [93 %-95 %] 95 %  BP: (124-147)/(82-92) 147/92     Weight: 78 kg (171 lb 15.3 oz)  Body mass index is 27.75 kg/m².  ECOG Performance Status Grade: 2 - Ambulates, capable of self care only    Intake/Output - Last 3 Shifts         11/21 0700 11/22 0659 11/22 0700 11/23 0659 11/23 0700 11/24 0659    IV Piggyback   73.4    Total Intake(mL/kg)   73.4 (0.9)    Urine (mL/kg/hr)  120 170 (0.6)    Drains  60     Stool  0     Total Output  180 170    Net  -180 -96.6           Stool Occurrence  1 x             SpO2: 95 %        Physical Exam  Vitals reviewed.   Constitutional:       Appearance: He is ill-appearing.   Cardiovascular:      Rate and Rhythm: Normal rate and regular rhythm.   Pulmonary:      Effort: Pulmonary effort is normal. No respiratory distress.      Comments: 5 L NC. Bilateral chest tubes. No air leak. Minimal tidaling with stripping.   Abdominal:      Palpations: Abdomen is soft.      Tenderness: There is no abdominal tenderness.      Comments: Port site incision healing well. Bilateral KENNY drains with small volume serous output.   Skin:     General: Skin is warm.   Neurological:      General: No focal deficit present.      Mental Status: He is disoriented.            Significant Labs:  CBC:   Recent Labs   Lab 11/23/24  0530   WBC 19.34*   RBC 3.21*   HGB 8.4*   HCT 26.5*      MCV 83   MCH 26.2*   MCHC  31.7*     CMP:   Recent Labs   Lab 11/23/24  0530   *   CALCIUM 8.6*   ALBUMIN 2.1*   PROT 5.7*   *   K 3.4*   CO2 25   *   BUN 31*   CREATININE 1.5*   ALKPHOS 137   ALT 26   AST 71*   BILITOT 2.8*       Significant Diagnostics:  I have reviewed all pertinent imaging results/findings within the past 24 hours.    VTE Risk Mitigation (From admission, onward)      None

## 2024-11-23 NOTE — CONSULTS
[facility] Consult Note    Attending: Alma Rosa Breaux MD   Admit Date: 11/23/2024  Today's Date: 11/23/2024    SUBJECTIVE:     HPI:  Jose Hernández Sr. is a 70 yo M w/ CHF (EF 20%), chronic NSAID use, alcohol dependence, spinal stenosis, CVA who was admitted to outside hospital on 11/10/2024 with epigastric pain, nausea, vomiting, diarrhea, melena.  He was found to be anemic with a hemoglobin of 5.7, and vitals with blood pressure 70s over 40s, pulse 110 and a lactic acid of 9.9.  CT abdomen showed evidence of pneumomediastinum, pneumoperitoneum with concern for perforated viscus.  Patient was taken to surgery and had a robotic assisted laparotomy performed with bilateral chest tube placement and bilateral intra-abdominal drain placement on 11/10.  They noted gangrene of the lower thoracic esophagus and perforation of an esophageal ulcer which were repaired.  General surgery also did an EGD right after the laparoscopy and found no evidence of intra esophageal leakage.  However patient then went into acute hemorrhagic shock, acute hypoxic respiratory failure and admitted to the ICU.  He was intubated and on pressors and antibiotics but has since been extubated and off pressors and not tolerating NG tube feeds.  Discussion with CT surgery resulted in [facility].  AES consulted for possible stent placement if necessary.    Patient overall reports minimal abdominal pain, nausea, vomiting, diarrhea, constipation, melena, hematochezia, hematemesis.  He has bilateral chest tubes in bilateral abdominal drains in place where he reports some mild tenderness around the sites.  Otherwise also denies any fevers, chills but does appear disoriented and lethargic.    Review of patient's allergies indicates:  No Known Allergies    Past Medical History:   Diagnosis Date    Arthritis     Gout     Hyperlipemia     Hypertension     Stroke 2012     Past Surgical  History:   Procedure Laterality Date    HAND SURGERY      SKIN GRAFT       Family History   Problem Relation Name Age of Onset    No Known Problems Mother      Hypertension Father       Social History     Tobacco Use    Smoking status: Never    Smokeless tobacco: Never   Substance Use Topics    Alcohol use: Yes    Drug use: Yes     Types: Marijuana       All home medications reviewed.      OBJECTIVE:     Vital Signs Trends/History Reviewed  Vitals:    11/23/24 0701 11/23/24 0800 11/23/24 0900 11/23/24 1026   BP: 130/87 125/84 (!) 147/92    BP Location: Left arm Left arm Left arm    Patient Position: Lying Lying Lying    Pulse: 91 94 91 89   Resp: (!) 35 (!) 28 (!) 33    Temp: 99.4 °F (37.4 °C)      TempSrc: Axillary      SpO2: (!) 94% 95% 95%    Weight:       Height:           Physical Exam  Constitutional:       Appearance: He is ill-appearing. He is not toxic-appearing.      Comments: Appears lethargic   HENT:      Head: Normocephalic and atraumatic.   Eyes:      General: No scleral icterus.  Pulmonary:      Effort: Pulmonary effort is normal. No respiratory distress.   Abdominal:      General: There is no distension.      Tenderness: There is no abdominal tenderness. There is no guarding or rebound.   Skin:     General: Skin is warm and dry.      Coloration: Skin is not jaundiced.   Neurological:      Mental Status: He is disoriented.       Laboratory: All labs results within last 24 hours have been reviewed.     Imaging: All imaging results within the last 24 hours have been reviewed.       Infusions:        Scheduled Medications:    folic acid (FOLVITE) IVPB  1 mg Intravenous Daily    meropenem IV (PEDS and ADULTS)  2 g Intravenous Q12H    [START ON 11/24/2024] micafungin  100 mg Intravenous Q24H    pantoprazole  40 mg Intravenous BID    thiamine (B-1) 500 mg in D5W 100 mL IVPB  500 mg Intravenous TID    Followed by    [START ON 11/26/2024] thiamine  100 mg Per NG tube Daily    [START ON 11/24/2024] vancomycin  (VANCOCIN) IV (PEDS and ADULTS)  1,000 mg Intravenous Q24H       PRN Medications:     Current Facility-Administered Medications:     HYDROmorphone, 0.5 mg, Intravenous, Q6H PRN    ondansetron, 4 mg, Intravenous, Once PRN    sodium chloride 0.9%, 10 mL, Intravenous, PRN    Pharmacy to dose Vancomycin consult, , , Once **AND** vancomycin - pharmacy to dose, , Intravenous, pharmacy to manage frequency    ASSESSMENT:      68 yo M w/ CHF (EF 20%), chronic NSAID use, alcohol dependence, spinal stenosis, CVA who was admitted to outside hospital on 11/10/2024 with epigastric pain, nausea, vomiting, diarrhea, melena and found to have esophageal perforation status post surgery on 11/10.  At the outside hospital patient's stay was complicated by acute hypoxic respiratory failure acute hemorrhagic shock and was admitted to the ICU intubated, on pressors, and broad-spectrum antibiotics.  Since then has been extubated and off pressors.  Now transferred to [facility] [place] evaluation with concerns of consistent perforation.     RECOMMENDATIONS:      - recommend discussing case with CT surgery  - please obtain CT with oral contrast to assess status of esophageal perforation  - AES may be able to place a stent but this would be a temporizing measure and not a permanent solution if patient continues to have an esophageal perforation.  Therefore surgery is the more optimal treatment if possible.    Thank you for allowing us to participate in the care of this patient. Please call with questions.    Discussed with Dr. Sree Montes MD , PGY-IV  Gastroenterology Fellow  [facility]

## 2024-11-23 NOTE — ASSESSMENT & PLAN NOTE
69 yoM s/p primary repair of esophageal perforation at [facility] on 11/10. Transferred for concern of ongoing leak. No access to outside imaging at this time. Overall clinically stable in the MICU. Not on pressors. Minimal respiratory support. Case discussed with Dr. Goldberg and reviewed with family. We will need to obtain imaging to rule out ongoing esophageal leak, and potential un drained pleural fluid. Patient is quite frail with poor cardiac function and would not tolerate surgical intervention well, therefore minimally invasive procedures would be preferable.    - CT with oral contrast to evaluate for esophageal leak  - AES consulted, appreciate their help if stent is needed  - If concern for un drained pleural fluid, recommend IR drain placement  - Care per MICU  - Thoracic surgery will follow

## 2024-11-23 NOTE — CONSULTS
[facility] - Medical ICU  Infectious Disease  Consult Note    Patient Name: Jose Hernández Sr.  MRN: [id number]  Admission Date: 11/23/2024  Hospital Length of Stay: 0 days  Attending Physician: Alma Rosa Breaux MD  Primary Care Provider: Tong Cantrell MD     Isolation Status: No active isolations    Patient information was obtained from relative(s), past medical records, and primary team.      Inpatient consult to Infectious Diseases  Consult performed by: Penelope Earl MD  Consult ordered by: Kori Reid MD      Inpatient consult to Infectious Diseases  Consult performed by: Penelope Earl MD  Consult ordered by: Kori Reid MD        Assessment/Plan:     GI  * Esophageal perforation  68 yo male with alcohol use, CVA, prior cdiff 2021 and HTN admitted for HLOC for esophageal perforation. At OSH, pt was found to have pneumoperitoneum/pneumomediastinum on imaging c/f perforated viscus and loculated pleural effusion s/p robotic lap on 11/10, found to have gangrenous thoracic esophagus/hiatal hernia, esophageal ulcer perforation that was repaired with omental patch (op note significant large intraabdominal/mediastinal pus with food stuff/pills removed from mediastinum, s/p EGD with reported no evidence of intra-esophageal leak, no OR cx sent. Prior hospital course notable for shock, respiratry failure s/p intubation now extubated. CTS/GI consulted. Repeat CT pending. Labs notable for leukocytosis and cr 1.5 - unclear baseline.     Recommendations:  -agree with repeat CT  -continue empiric vancomycin pharm to dose, meropenem, and micafungin  -blood cx x2 ordered  -follow up CTS/GI recs        Thank you for your consult. I will follow-up with patient. Please contact us if you have any additional questions. Above d/w primary team.       Penelope Earl MD  Infectious Disease  [facility] - Medical ICU      75 minutes of total time spent on the encounter, which includes face to face time and  non-face to face time preparing to see the patient (eg, review of tests), obtaining and/or reviewing separately obtained history, documenting clinical information in the electronic or other health record, independently interpreting results (not separately reported) and communicating results to the patient/family/caregiver, or care coordination (not separately reported).       Subjective:     Principal Problem: Esophageal perforation    HPI: 70 yo male with alcohol use, CVA, prior cdiff 2021 and HTN admitted for HLOC for esophageal perforation. At OSH, pt was found to have pneumoperitoneum/pneumomediastinum on imaging c/f perforated viscus and loculated pleural effusion s/p robotic lap on 11/10, found to have gangrenous thoracic esophagus/hiatal hernia, esophageal ulcer perforation that was repaired with omental patch (op note significant large intraabdominal/mediastinal pus with food stuff/pills removed from mediastinum, s/p EGD with reported no evidence of intra-esophageal leak. Prior hospital course notable for shock, respiratry failure s/p intubation now extubated. Pt is currently on vancomycin, meropenem, and micafungin. CTS and GI consulted.            Past Medical History:   Diagnosis Date    Arthritis     Gout     Hyperlipemia     Hypertension     Stroke 2012       Past Surgical History:   Procedure Laterality Date    HAND SURGERY      SKIN GRAFT         Review of patient's allergies indicates:  No Known Allergies    Medications:  Medications Prior to Admission   Medication Sig    acetaminophen (TYLENOL) 650 MG TbSR Take 1 tablet by mouth once daily.    allopurinoL (ZYLOPRIM) 100 MG tablet Take 100 mg by mouth once daily.    amLODIPine (NORVASC) 10 MG tablet Take 1 tablet by mouth once daily.    aspirin (ECOTRIN) 81 MG EC tablet Take 1 tablet (81 mg total) by mouth once daily.    atorvastatin (LIPITOR) 80 MG tablet Take 1 tablet (80 mg total) by mouth once daily.    cholecalciferol, vitamin D3, 125 mcg (5,000  "unit) Tab Take 1 tablet by mouth once daily.    clopidogreL (PLAVIX) 75 mg tablet Take 1 capsule orally once a day.    cyanocobalamin 500 MCG tablet Take 1 tablet by mouth 2 (two) times daily.    diclofenac sodium (VOLTAREN) 1 % Gel Apply 2 g topically once daily.    DULoxetine (CYMBALTA) 30 MG capsule Take 30 mg by mouth once daily.    gabapentin (NEURONTIN) 800 MG tablet Take 1 tablet (800 mg total) by mouth once daily.    multivitamin capsule Take 1 capsule by mouth once daily.    pantoprazole (PROTONIX) 40 MG tablet Take 40 mg by mouth.    phenazopyridine HCl (URISTAT ORAL) Take by mouth.    pumpkin seed extract/soy germ (AZO BLADDER CONTROL ORAL) Take by mouth.    tamsulosin (FLOMAX) 0.4 mg Cap Take 1 capsule (0.4 mg total) by mouth once daily.    tiZANidine (ZANAFLEX) 4 MG tablet Take 4 mg by mouth.    traZODone (DESYREL) 50 MG tablet Take 1 tablet (50 mg total) by mouth every evening.    zolpidem (AMBIEN) 10 mg Tab Take 1 tablet (10 mg total) by mouth once daily.     Antibiotics (From admission, onward)      Start     Stop Route Frequency Ordered    11/23/24 0900  meropenem 2 g in 0.9% NaCl 100 mL IVPB (MB+)         -- IV Every 12 hours (non-standard times) 11/23/24 0547    11/23/24 0800  vancomycin 1,500 mg in 0.9% NaCl 250 mL IVPB (admixture device)         -- IV Once 11/23/24 0631    11/23/24 0800  vancomycin (VANCOCIN) 1,000 mg in 0.9% NaCl 250 mL IVPB (admixture device)         -- IV Every 24 hours (non-standard times) 11/23/24 0631    11/23/24 0521  vancomycin - pharmacy to dose  (vancomycin IVPB (PEDS and ADULTS))        Placed in "And" Linked Group    -- IV pharmacy to manage frequency 11/23/24 0521          Antifungals (From admission, onward)      Start     Stop Route Frequency Ordered    11/24/24 0930  micafungin 100 mg in 0.9% NaCl 100 mL IVPB (MB+)         -- IV Every 24 hours (non-standard times) 11/23/24 0547          Antivirals (From admission, onward)      None             Immunization " History   Administered Date(s) Administered    COVID-19 Vaccine 10/01/2021    Hepatitis B, Adult 10/11/2005    Pneumococcal Polysaccharide - 23 Valent 09/29/2021    Td (ADULT) 09/29/2021       Family History       Problem Relation (Age of Onset)    Hypertension Father    No Known Problems Mother          Social History     Socioeconomic History    Marital status:     Number of children: 3   Tobacco Use    Smoking status: Never    Smokeless tobacco: Never   Substance and Sexual Activity    Alcohol use: Yes    Drug use: Yes     Types: Marijuana    Sexual activity: Yes     Social Drivers of Health     Financial Resource Strain: Medium Risk (6/29/2023)    Received from [facility] Subsidiaries and Affiliates    Overall Financial Resource Strain (CARDIA)     Difficulty of Paying Living Expenses: Somewhat hard   Food Insecurity: Food Insecurity Present (6/29/2023)    Received from [facility] Subsidiaries and Affiliates    Hunger Vital Sign     Worried About Running Out of Food in the Last Year: Sometimes true     Ran Out of Food in the Last Year: Sometimes true   Transportation Needs: No Transportation Needs (6/29/2023)    Received from [facility] Its Subsidiaries and Affiliates    PRAPARE - Transportation     Lack of Transportation (Medical): No     Lack of Transportation (Non-Medical): No   Physical Activity: Inactive (6/29/2023)    [facility]    Exercise Vital Sign     Days of Exercise per Week: 0 days     Minutes of Exercise per Session: 0 min   Stress: No Stress Concern Present (6/29/2023)    Received from [facility] Subsidiaries and Affiliates    [employer] [place] [employer] - Occupational Stress Questionnaire     Feeling of  Stress : Only a little   Housing Stability: Low Risk  (6/29/2023)    Received from [facility] and Its Subsidiaries and Affiliates    Housing Stability Vital Sign     Unable to Pay for Housing in the Last Year: No     Number of Places Lived in the Last Year: 1     Unstable Housing in the Last Year: No     Review of Systems   Unable to perform ROS: Acuity of condition     Objective:     Vital Signs (Most Recent):  Temp: 99.4 °F (37.4 °C) (11/23/24 0701)  Pulse: 94 (11/23/24 0800)  Resp: (!) 28 (11/23/24 0800)  BP: 125/84 (11/23/24 0800)  SpO2: 95 % (11/23/24 0800) Vital Signs (24h Range):  Temp:  [98.4 °F (36.9 °C)-99.4 °F (37.4 °C)] 99.4 °F (37.4 °C)  Pulse:  [89-94] 94  Resp:  [22-35] 28  SpO2:  [93 %-95 %] 95 %  BP: (124-143)/(82-91) 125/84     Weight: 78 kg (171 lb 15.3 oz)  Body mass index is 27.75 kg/m².    Estimated Creatinine Clearance: 45.7 mL/min (A) (based on SCr of 1.5 mg/dL (H)).     Physical Exam  Constitutional:       General: He is not in acute distress.     Appearance: He is ill-appearing.   HENT:      Nose:      Comments: NGT  Pulmonary:      Effort: Pulmonary effort is normal. No respiratory distress.      Comments: Brant chest tubes  Abdominal:      Palpations: Abdomen is soft.      Tenderness: There is no abdominal tenderness.      Comments: Brant abdominal drains - ss fluid   Genitourinary:     Comments: douglass  Musculoskeletal:      Right lower leg: No edema.      Left lower leg: No edema.   Skin:     General: Skin is warm and dry.      Comments: RIJ  R chest abrasions     Neurological:      Mental Status: He is disoriented.          Significant Labs:   Microbiology Results (last 7 days)       ** No results found for the last 168 hours. **            Significant Imaging: I have reviewed all pertinent imaging results/findings within the past 24 hours.

## 2024-11-23 NOTE — ASSESSMENT & PLAN NOTE
Takes statin at home; holding in the setting of previous transaminitis.    - Daily CMP  - Resume medications as appropriate

## 2024-11-23 NOTE — ASSESSMENT & PLAN NOTE
68 yo male with alcohol use, CVA, prior cdiff 2021 and HTN admitted for HLOC for esophageal perforation. At OSH, pt was found to have pneumoperitoneum/pneumomediastinum on imaging c/f perforated viscus and loculated pleural effusion s/p robotic lap on 11/10, found to have gangrenous thoracic esophagus/hiatal hernia, esophageal ulcer perforation that was repaired with omental patch (op note significant large intraabdominal/mediastinal pus with food stuff/pills removed from mediastinum, s/p EGD with reported no evidence of intra-esophageal leak, no OR cx sent. Prior hospital course notable for shock, respiratry failure s/p intubation now extubated. CTS/GI consulted. Repeat CT pending. Labs notable for leukocytosis and cr 1.5 - unclear baseline.     Recommendations:  -agree with repeat CT  -continue empiric vancomycin pharm to dose, meropenem, and micafungin  -blood cx x2 ordered  -follow up CTS/GI recs

## 2024-11-24 PROBLEM — I50.21 ACUTE SYSTOLIC HEART FAILURE: Status: ACTIVE | Noted: 2024-11-24

## 2024-11-24 PROBLEM — N17.9 AKI (ACUTE KIDNEY INJURY): Status: ACTIVE | Noted: 2024-11-24

## 2024-11-24 NOTE — SUBJECTIVE & OBJECTIVE
Interval History: CT overnight with esophageal leak.  Remains NPO.  Hemodynamically stable.  Plan for AES to perform endoscopy with Stent.  Requested a PEG as well.       Objective:     Vital Signs (Most Recent):  Temp: 98.4 °F (36.9 °C) (11/24/24 1100)  Pulse: 96 (11/24/24 1400)  Resp: (!) 35 (11/24/24 1400)  BP: 120/78 (11/24/24 1400)  SpO2: (!) 93 % (11/24/24 1400) Vital Signs (24h Range):  Temp:  [98.2 °F (36.8 °C)-98.5 °F (36.9 °C)] 98.4 °F (36.9 °C)  Pulse:  [87-96] 96  Resp:  [21-35] 35  SpO2:  [93 %-99 %] 93 %  BP: (105-146)/(67-94) 120/78     Weight: 78 kg (171 lb 15.3 oz)  Body mass index is 27.75 kg/m².      Intake/Output Summary (Last 24 hours) at 11/24/2024 1452  Last data filed at 11/24/2024 1400  Gross per 24 hour   Intake 1356.19 ml   Output 1815 ml   Net -458.81 ml        Physical Exam  Constitutional:       Appearance: He is ill-appearing.   HENT:      Head:      Comments: NGT with old dried blood    Eyes:      Pupils: Pupils are equal, round, and reactive to light.   Cardiovascular:      Rate and Rhythm: Normal rate and regular rhythm.   Pulmonary:      Effort: Pulmonary effort is normal.      Comments: Bilateral chest tubes without much drainage.      Musculoskeletal:         General: No swelling.      Cervical back: Normal range of motion and neck supple.   Skin:     General: Skin is warm and dry.   Neurological:      General: No focal deficit present.      Mental Status: He is alert.   Psychiatric:         Mood and Affect: Mood normal.           Review of Systems    Vents:       Lines/Drains/Airways       Central Venous Catheter Line  Duration             Percutaneous Central Line - Triple Lumen    Internal Jugular Right -- days              Drain  Duration                  Chest Tube   Left Midaxillary -- days         Chest Tube   Right Midaxillary -- days         Closed/Suction Drain   Right Abdomen Bulb -- days         Closed/Suction Drain  Left Abdomen Bulb -- days         NG/OG Tube    Arabella torres Right nostril -- days         Urethral Catheter 11/23/24 1313 Temperature probe 1 day              Peripheral Intravenous Line  Duration                  Peripheral IV - Single Lumen 11/23/24 0901 18 G 1 3/4 in No Left Antecubital 1 day                    Significant Labs:    CBC/Anemia Profile:  Recent Labs   Lab 11/23/24  0530 11/24/24  0311   WBC 19.34* 20.99*   HGB 8.4* 8.0*   HCT 26.5* 25.5*    381   MCV 83 85   RDW 20.9* 21.3*        Chemistries:  Recent Labs   Lab 11/23/24  0530 11/24/24  0311   * 147*   K 3.4* 3.7   * 113*   CO2 25 24   BUN 31* 27*   CREATININE 1.5* 1.6*   CALCIUM 8.6* 8.9   ALBUMIN 2.1* 1.9*   PROT 5.7* 5.8*   BILITOT 2.8* 3.3*   ALKPHOS 137 156*   ALT 26 55*   AST 71* 146*   MG 2.1 2.1   PHOS 3.0 3.9       Creatinine and sodium rising.      Significant Imaging:  I have reviewed all pertinent imaging results/findings within the past 24 hours.

## 2024-11-24 NOTE — ASSESSMENT & PLAN NOTE
68 yo male with alcohol use, CVA, prior cdiff 2021 and HTN admitted for HLOC for esophageal perforation. At OSH, pt was found to have pneumoperitoneum/pneumomediastinum on imaging c/f perforated viscus and loculated pleural effusion s/p robotic lap on 11/10, found to have gangrenous thoracic esophagus/hiatal hernia, esophageal ulcer perforation that was repaired with omental patch (op note significant large intraabdominal/mediastinal pus with food stuff/pills removed from mediastinum, s/p EGD with reported no evidence of intra-esophageal leak, no OR cx sent. Prior hospital course notable for shock, respiratry failure s/p intubation now extubated. Repeat CT c/w esophageal leak. Labs notable for persistent leukocytosis. Pending potential stent placement tomorrow. Blood cx in process.     Recommendations:  -continue empiric vancomycin pharm to dose, meropenem 2g q8hr iv, and micafungin 100 mcg iv daily  -follow up CTS/GI recs  -follow up cx data

## 2024-11-24 NOTE — ASSESSMENT & PLAN NOTE
Bilateral chest tube placed for [place].  Afebrile.  Not draining.  Appreciate thoracic surgery recs.

## 2024-11-24 NOTE — HPI
Patient had esophageal perforation with food particles including ibuprofen tablets in the pleural space on initial presentation.   Patient had esophageal rupture repair on  11/10 with bilateral chest tubes and KENNY drain for pneumoperitoneum.     Transferred here for concern that anastomosis is not healing and requiring a higher level of care.  CT with oral contrast confirms esophageal leak.    Notified thoracic surgery who requested AES place stent.  Both consultants notes reviewed.  Endoscopy with stent placement scheduled for 11/25

## 2024-11-24 NOTE — TREATMENT PLAN
AES Treatment Plan    Jose Hernández Sr. is a 69 y.o. male admitted to hospital 11/23/2024 (Hospital Day: 2) due to Esophageal perforation.     Interval History  CT with oral contrast done on 11/23 showing persistent leak.  Determined to be a poor surgical candidate by CT surgery.  Requesting AES to place a esophageal stent.    Objective  Temp:  [98.2 °F (36.8 °C)-98.5 °F (36.9 °C)] 98.5 °F (36.9 °C) (11/24 0701)  Pulse:  [87-96] 93 (11/24 1100)  BP: (112-146)/(67-94) 123/74 (11/24 1100)  Resp:  [21-33] 33 (11/24 1100)  SpO2:  [94 %-99 %] 94 % (11/24 1100)    Laboratory    Recent Labs   Lab 11/23/24  0530 11/24/24  0311   HGB 8.4* 8.0*         Assessment and Plan  70 yo M w/ CHF (EF 20%), chronic NSAID use, alcohol dependence, spinal stenosis, CVA who was admitted to outside hospital on 11/10/2024 with epigastric pain, nausea, vomiting, diarrhea, melena and found to have esophageal perforation status post surgery on 11/10.  At the outside hospital patient's stay was complicated by acute hypoxic respiratory failure acute hemorrhagic shock and was admitted to the ICU intubated, on pressors, and broad-spectrum antibiotics.  Since then has been extubated and off pressors.  Now transferred to [facility] for further evaluation with concerns of consistent perforation.     - Tentative plan for EGD and esophageal stent placement on 11/25  - Again this would just be a temporizing measure not a permanent solution but given surgery believes he is a poor surgical candidate, we will likely proceed with esophageal stent placement tomorrow.  - keep NPO after midnight tonight  - hold anticoagulation/antiplatelets if not contraindicated  - We will continue to follow.    Thank you for involving us in the care of Jose Hernández Sr.. Please call with any additional questions, concerns or changes in the patient's clinical status.    Discussed with Dr. Sree Montes MD  Gastroenterology Fellow, PGY IV

## 2024-11-24 NOTE — ASSESSMENT & PLAN NOTE
69 yoM s/p primary repair of esophageal perforation at [facility] on 11/10. Transferred for concern of ongoing leak. No access to outside imaging at this time. Overall clinically stable in the MICU. Not on pressors. Minimal respiratory support. Case discussed with Dr. Goldberg and reviewed with family. Patient is quite frail with poor cardiac function and would not tolerate surgical intervention well, therefore minimally invasive procedures would be preferable.    - CT with oral contrast on 11/23 demonstrates persistent leak, recommend stent placement with AES  - Patient is a very poor surgical candidate  - No significant pleural fluid requiring extra drainage procedures at this time  - Care per MICU  - Thoracic surgery will follow

## 2024-11-24 NOTE — ASSESSMENT & PLAN NOTE
Status post repair with bilateral chest tubes and peritoneal drains 11/10  Transferred to [facility] 11/24 for higher level of care and concern for esophageal leak confirmed with CT overnight.  AES to place stent in am. Also requested to place PEG    NGT in place.

## 2024-11-24 NOTE — ASSESSMENT & PLAN NOTE
Reported EF of 15% at outside facility.  Hemodynamically stable.  Will repeat echo to ensure that this was not sepsis induced.  Sepsis now resolved.

## 2024-11-24 NOTE — PROGRESS NOTES
[facility] - Medical ICU  Infectious Disease  Progress Note    Patient Name: Jose Hernández Sr.  MRN: [id number]  Admission Date: 11/23/2024  Length of Stay: 1 days  Attending Physician: Alma Rosa Breaux MD  Primary Care Provider: Tong Cantrell MD    Isolation Status: No active isolations  Assessment/Plan:      GI  * Esophageal perforation  68 yo male with alcohol use, CVA, prior cdiff 2021 and HTN admitted for HLOC for esophageal perforation. At OSH, pt was found to have pneumoperitoneum/pneumomediastinum on imaging c/f perforated viscus and loculated pleural effusion s/p robotic lap on 11/10, found to have gangrenous thoracic esophagus/hiatal hernia, esophageal ulcer perforation that was repaired with omental patch (op note significant large intraabdominal/mediastinal pus with food stuff/pills removed from mediastinum, s/p EGD with reported no evidence of intra-esophageal leak, no OR cx sent. Prior hospital course notable for shock, respiratry failure s/p intubation now extubated. Repeat CT c/w esophageal leak. Labs notable for persistent leukocytosis. Pending potential stent placement tomorrow, not a surgical candidate at this time. Blood cx in process.     Recommendations:  -continue empiric vancomycin pharm to dose, meropenem 2g q8hr iv, and micafungin 100 mcg iv daily  -follow up CTS/GI recs  -follow up cx data        Thank you for your consult. I will follow-up with patient. Please contact us if you have any additional questions. Above d/w primary team.       Penelope Earl MD  Infectious Disease  [facility] - Medical ICU        50 minutes of total time spent on the encounter, which includes face to face time and non-face to face time preparing to see the patient (eg, review of tests), obtaining and/or reviewing separately obtained history, documenting clinical information in the electronic or other health record, independently interpreting results (not separately reported) and communicating results to the  patient/family/caregiver, or care coordination (not separately reported).     Subjective:     Principal Problem:Esophageal perforation    HPI: 68 yo male with alcohol use, CVA, prior cdiff 2021 and HTN admitted for HLOC for esophageal perforation. At OSH, pt was found to have pneumoperitoneum/pneumomediastinum on imaging c/f perforated viscus and loculated pleural effusion s/p robotic lap on 11/10, found to have gangrenous thoracic esophagus/hiatal hernia, esophageal ulcer perforation that was repaired with omental patch (op note significant large intraabdominal/mediastinal pus with food stuff/pills removed from mediastinum, s/p EGD with reported no evidence of intra-esophageal leak. Prior hospital course notable for shock, respiratry failure s/p intubation now extubated. Pt is currently on vancomycin, meropenem, and micafungin. CTS and GI consulted.          Interval History: No fevers documented overnight. Repeat CT c/f esophageal leak.     Review of Systems   Unable to perform ROS: Acuity of condition     Objective:     Vital Signs (Most Recent):  Temp: 98.4 °F (36.9 °C) (11/24/24 1100)  Pulse: 93 (11/24/24 1200)  Resp: (!) 26 (11/24/24 1200)  BP: 105/74 (11/24/24 1200)  SpO2: (!) 93 % (11/24/24 1200) Vital Signs (24h Range):  Temp:  [98.2 °F (36.8 °C)-98.5 °F (36.9 °C)] 98.4 °F (36.9 °C)  Pulse:  [87-96] 93  Resp:  [21-33] 26  SpO2:  [93 %-99 %] 93 %  BP: (105-146)/(67-94) 105/74     Weight: 78 kg (171 lb 15.3 oz)  Body mass index is 27.75 kg/m².    Estimated Creatinine Clearance: 42.8 mL/min (A) (based on SCr of 1.6 mg/dL (H)).     Physical Exam  Constitutional:       Appearance: He is ill-appearing.   HENT:      Nose:      Comments: NGT  Eyes:      General:         Right eye: No discharge.         Left eye: No discharge.   Cardiovascular:      Rate and Rhythm: Normal rate and regular rhythm.   Pulmonary:      Effort: Pulmonary effort is normal. No respiratory distress.      Comments: Brant chest  tubes  Abdominal:      General: There is no distension.      Palpations: Abdomen is soft.      Tenderness: There is no abdominal tenderness. There is no guarding.      Comments: Brant abdominal drains - ss fluid  R chest wounds dressed   Genitourinary:     Comments: douglass  Musculoskeletal:      Right lower leg: No edema.      Left lower leg: No edema.   Skin:     General: Skin is warm and dry.      Comments: RIMARIA G   Neurological:      Mental Status: He is disoriented.          Significant Labs:   Microbiology Results (last 7 days)       Procedure Component Value Units Date/Time    Blood culture [[id number]] Collected: 11/24/24 0840    Order Status: Sent Specimen: Blood Updated: 11/24/24 0840    Clostridium difficile EIA [[id number]]     Order Status: No result Specimen: Stool     Blood culture [[id number]]     Order Status: Sent Specimen: Blood             Significant Imaging: I have reviewed all pertinent imaging results/findings within the past 24 hours.

## 2024-11-24 NOTE — SUBJECTIVE & OBJECTIVE
Interval History: NAEON. CT yesterday with persistent esophageal leak. AVSS. Remains on minimal O2 support.     Medications:  Continuous Infusions:  Scheduled Meds:   folic acid (FOLVITE) IVPB  1 mg Intravenous Daily    meropenem IV (PEDS and ADULTS)  2 g Intravenous Q12H    micafungin  100 mg Intravenous Q24H    pantoprazole  40 mg Intravenous BID    thiamine (B-1) 500 mg in D5W 100 mL IVPB  500 mg Intravenous TID    Followed by    [START ON 11/26/2024] thiamine  100 mg Per NG tube Daily    vancomycin (VANCOCIN) IV (PEDS and ADULTS)  1,000 mg Intravenous Q24H     PRN Meds:  Current Facility-Administered Medications:     HYDROmorphone, 0.5 mg, Intravenous, Q6H PRN    iohexol, 15 mL, Oral, PRN    ondansetron, 4 mg, Intravenous, Once PRN    sodium chloride 0.9%, 10 mL, Intravenous, PRN    Pharmacy to dose Vancomycin consult, , , Once **AND** vancomycin - pharmacy to dose, , Intravenous, pharmacy to manage frequency     Review of patient's allergies indicates:  No Known Allergies  Objective:     Vital Signs (Most Recent):  Temp: 98.5 °F (36.9 °C) (11/24/24 0701)  Pulse: 92 (11/24/24 1000)  Resp: (!) 25 (11/24/24 1000)  BP: 112/70 (11/24/24 1000)  SpO2: 99 % (11/24/24 1000) Vital Signs (24h Range):  Temp:  [98.2 °F (36.8 °C)-98.8 °F (37.1 °C)] 98.5 °F (36.9 °C)  Pulse:  [87-96] 92  Resp:  [21-33] 25  SpO2:  [94 %-99 %] 99 %  BP: (112-147)/(67-94) 112/70     Intake/Output - Last 3 Shifts         11/22 0700 11/23 0659 11/23 0700 11/24 0659 11/24 0700 11/25 0659    P.O.  900     IV Piggyback  793.1 431.2    Total Intake(mL/kg)  1693.1 (21.7) 431.2 (5.5)    Urine (mL/kg/hr) 120 1534 (0.8) 445 (1.5)    Drains 60 90     Stool 0 0     Chest Tube  40     Total Output 180 1664 445    Net -180 +29.1 -13.9           Stool Occurrence 1 x 2 x             SpO2: 99 %        Physical Exam  Vitals reviewed.   Cardiovascular:      Rate and Rhythm: Normal rate and regular rhythm.   Pulmonary:      Effort: Pulmonary effort is normal. No  respiratory distress.      Comments: Bilateral chest tubes with minimal output and no air leak.   Abdominal:      Palpations: Abdomen is soft.      Tenderness: There is no abdominal tenderness.      Comments: Bilateral KENNY drains with serous output. Port site incisions healing well.   Skin:     General: Skin is warm.   Neurological:      General: No focal deficit present.      Mental Status: He is alert and oriented to person, place, and time.            Significant Labs:  CBC:   Recent Labs   Lab 11/24/24 0311   WBC 20.99*   RBC 3.01*   HGB 8.0*   HCT 25.5*      MCV 85   MCH 26.6*   MCHC 31.4*     CMP:   Recent Labs   Lab 11/24/24 0311   GLU 83   CALCIUM 8.9   ALBUMIN 1.9*   PROT 5.8*   *   K 3.7   CO2 24   *   BUN 27*   CREATININE 1.6*   ALKPHOS 156*   ALT 55*   *   BILITOT 3.3*       Significant Diagnostics:  CT: I have reviewed all pertinent results/findings within the past 24 hours    VTE Risk Mitigation (From admission, onward)      None

## 2024-11-24 NOTE — PROGRESS NOTES
[facility] - Medical ICU  Thoracic Surgery  Progress Note    Subjective:     History of Present Illness:  Mr. Hernández is a 69 yoM with PMH significant for chronic alcohol abuse and NSAID use who suffered a perforated esophageal ulcer and underwent promary repair with bilateral chest tube placement and bilateral intraabdominal drain placement on 11/10 at an [facility]. He was transferred here for concern of persistent leak and potential AES, IR, and CTS intervention. Of note, his EF was found to be 20% on [facility] echo. He was transferred to the MICU.     Post-Op Info:  * No surgery found *         Interval History: NAEON. CT yesterday with persistent esophageal leak. AVSS. Remains on minimal O2 support.     Medications:  Continuous Infusions:  Scheduled Meds:   folic acid (FOLVITE) IVPB  1 mg Intravenous Daily    meropenem IV (PEDS and ADULTS)  2 g Intravenous Q12H    micafungin  100 mg Intravenous Q24H    pantoprazole  40 mg Intravenous BID    thiamine (B-1) 500 mg in D5W 100 mL IVPB  500 mg Intravenous TID    Followed by    [START ON 11/26/2024] thiamine  100 mg Per NG tube Daily    vancomycin (VANCOCIN) IV (PEDS and ADULTS)  1,000 mg Intravenous Q24H     PRN Meds:  Current Facility-Administered Medications:     HYDROmorphone, 0.5 mg, Intravenous, Q6H PRN    iohexol, 15 mL, Oral, PRN    ondansetron, 4 mg, Intravenous, Once PRN    sodium chloride 0.9%, 10 mL, Intravenous, PRN    Pharmacy to dose Vancomycin consult, , , Once **AND** vancomycin - pharmacy to dose, , Intravenous, pharmacy to manage frequency     Review of patient's allergies indicates:  No Known Allergies  Objective:     Vital Signs (Most Recent):  Temp: 98.5 °F (36.9 °C) (11/24/24 0701)  Pulse: 92 (11/24/24 1000)  Resp: (!) 25 (11/24/24 1000)  BP: 112/70 (11/24/24 1000)  SpO2: 99 % (11/24/24 1000) Vital Signs (24h Range):  Temp:  [98.2 °F (36.8 °C)-98.8 °F (37.1 °C)] 98.5 °F (36.9 °C)  Pulse:  [87-96] 92  Resp:  [21-33] 25  SpO2:  [94 %-99 %] 99 %  BP:  (112-147)/(67-94) 112/70     Intake/Output - Last 3 Shifts         11/22 0700  11/23 0659 11/23 0700 11/24 0659 11/24 0700 11/25 0659    P.O.  900     IV Piggyback  793.1 431.2    Total Intake(mL/kg)  1693.1 (21.7) 431.2 (5.5)    Urine (mL/kg/hr) 120 1534 (0.8) 445 (1.5)    Drains 60 90     Stool 0 0     Chest Tube  40     Total Output 180 1664 445    Net -180 +29.1 -13.9           Stool Occurrence 1 x 2 x             SpO2: 99 %        Physical Exam  Vitals reviewed.   Cardiovascular:      Rate and Rhythm: Normal rate and regular rhythm.   Pulmonary:      Effort: Pulmonary effort is normal. No respiratory distress.      Comments: Bilateral chest tubes with minimal output and no air leak.   Abdominal:      Palpations: Abdomen is soft.      Tenderness: There is no abdominal tenderness.      Comments: Bilateral KENNY drains with serous output. Port site incisions healing well.   Skin:     General: Skin is warm.   Neurological:      General: No focal deficit present.      Mental Status: He is alert and oriented to person, place, and time.            Significant Labs:  CBC:   Recent Labs   Lab 11/24/24  0311   WBC 20.99*   RBC 3.01*   HGB 8.0*   HCT 25.5*      MCV 85   MCH 26.6*   MCHC 31.4*     CMP:   Recent Labs   Lab 11/24/24 0311   GLU 83   CALCIUM 8.9   ALBUMIN 1.9*   PROT 5.8*   *   K 3.7   CO2 24   *   BUN 27*   CREATININE 1.6*   ALKPHOS 156*   ALT 55*   *   BILITOT 3.3*       Significant Diagnostics:  CT: I have reviewed all pertinent results/findings within the past 24 hours    VTE Risk Mitigation (From admission, onward)      None          Assessment/Plan:     * Esophageal perforation  69 yoM s/p primary repair [facility] esophageal perforation at [facility] on [facility]10. Transferred for concern of ongoing leak. No access to outside imaging at this time. Overall clinically stable in the MICU. Not on pressors. Minimal respiratory support. Case discussed with Dr. Goldberg and reviewed with family. Patient is  quite frail with poor cardiac function and would not tolerate surgical intervention well, therefore minimally invasive procedures would be preferable.    - CT with oral contrast on 11/23 demonstrates persistent leak, recommend stent placement with AES  - Patient is a very poor surgical candidate  - No significant pleural fluid requiring extra drainage procedures at this time  - Care per MICU  - Thoracic surgery will follow        Ellis Barrientos MD  Thoracic Surgery  [facility] - Medical ICU

## 2024-11-24 NOTE — PLAN OF CARE
MICU DAILY GOALS     Family/Goals of care/Code Status   Code Status: Full Code    24H Vital Sign Range  Temp:  [98.4 °F (36.9 °C)-99.4 °F (37.4 °C)]   Pulse:  [88-96]   Resp:  [22-35]   BP: (124-147)/(82-94)   SpO2:  [93 %-97 %]      Shift Events (include procedures and significant events)   No acute events throughout shift CT chest/abd/pelvis with contrast. Johansen exchanged.    AWAKE RASS: Goal - RASS Goal: 0-->alert and calm  Actual - RASS ([place] Agitation-Sedation Scale): alert and calm    Restraint necessity: Not necessary   BREATHE SBT: Not intubated    Coordinate A & B, analgesics/sedatives Pain: managed   SAT: Not intubated   Delirium CAM-ICU: Overall CAM-ICU: Negative   Early(intubated/ Progressive (non-intubated) Mobility MOVE Screen (INTUBATED ONLY): Not intubated    Activity: Activity Management: Arm raise - L1, Rolling - L1   Feeding/Nutrition Diet order: Diet/Nutrition Received: NPO,     Thrombus DVT prophylaxis:     HOB Elevation Head of Bed (HOB) Positioning: HOB at 20-30 degrees   Ulcer Prophylaxis GI: yes   Glucose control managed Glycemic Management: blood glucose monitored   Skin Skin assessment:     Sacrum intact/not altered? Yes  Heels intact/not altered? Yes  Surgical wound? Yes    CHECK ONE!   (no altered skin or altered skin) and sub boxes:  [] No Altered Skin Integrity Present    []Prevention Measures Documented    [x] Altered Skin Integrity Present or Discovered   [x] LDA present in EPIC, daily doc completed              [] LDA added if not in EPIC (describe wound).                    When describing wound, do not stage, use descriptive words only.    [] Wound Image Taken (required on admit,                   transfer/discharge and every Tuesday)    Wound Care Consulted? Yes   Bowel Function no issues    Indwelling Catheter Necessity      Urethral Catheter 11/23/24 1313 Temperature probe-Reason for Continuing Urinary Catheterization: Critically ill in ICU and requiring hourly monitoring  of intake/output  [REMOVED]      Urethral Catheter  -Reason for Continuing Urinary Catheterization: Critically ill in ICU and requiring hourly monitoring of intake/output          De-escalation Antibiotics Yes        VS and assessment per flow sheet, patient progressing towards goals as tolerated, plan of care reviewed with  patient and family , all concerns addressed, will continue to monitor.

## 2024-11-24 NOTE — SUBJECTIVE & OBJECTIVE
Interval History: No fevers documented overnight. Repeat CT c/f esophageal leak.     Review of Systems   Unable to perform ROS: Acuity of condition     Objective:     Vital Signs (Most Recent):  Temp: 98.4 °F (36.9 °C) (11/24/24 1100)  Pulse: 93 (11/24/24 1200)  Resp: (!) 26 (11/24/24 1200)  BP: 105/74 (11/24/24 1200)  SpO2: (!) 93 % (11/24/24 1200) Vital Signs (24h Range):  Temp:  [98.2 °F (36.8 °C)-98.5 °F (36.9 °C)] 98.4 °F (36.9 °C)  Pulse:  [87-96] 93  Resp:  [21-33] 26  SpO2:  [93 %-99 %] 93 %  BP: (105-146)/(67-94) 105/74     Weight: 78 kg (171 lb 15.3 oz)  Body mass index is 27.75 kg/m².    Estimated Creatinine Clearance: 42.8 mL/min (A) (based on SCr of 1.6 mg/dL (H)).     Physical Exam  Constitutional:       Appearance: He is ill-appearing.   HENT:      Nose:      Comments: NGT  Eyes:      General:         Right eye: No discharge.         Left eye: No discharge.   Cardiovascular:      Rate and Rhythm: Normal rate and regular rhythm.   Pulmonary:      Effort: Pulmonary effort is normal. No respiratory distress.      Comments: Brant chest tubes  Abdominal:      General: There is no distension.      Palpations: Abdomen is soft.      Tenderness: There is no abdominal tenderness. There is no guarding.      Comments: Brant abdominal drains - ss fluid  R chest wounds dressed   Genitourinary:     Comments: douglass  Musculoskeletal:      Right lower leg: No edema.      Left lower leg: No edema.   Skin:     General: Skin is warm and dry.      Comments: RIJ   Neurological:      Mental Status: He is disoriented.          Significant Labs:   Microbiology Results (last 7 days)       Procedure Component Value Units Date/Time    Blood culture [[id number]] Collected: 11/24/24 0840    Order Status: Sent Specimen: Blood Updated: 11/24/24 0840    Clostridium difficile EIA [[id number]]     Order Status: No result Specimen: Stool     Blood culture [[id number]]     Order Status: Sent Specimen: Blood             Significant Imaging: I  have reviewed all pertinent imaging results/findings within the past 24 hours.

## 2024-11-24 NOTE — PROGRESS NOTES
[facility] - Medical ICU  Pulmonology  Progress Note    Patient Name: Jose Hernández Sr.  MRN: [id number]  Admission Date: 11/23/2024  Hospital Length of Stay: 1 days  Code Status: Full Code  Attending Provider: Alma Rosa Breaux MD  Primary Care Provider: Tong Cantrell MD   Principal Problem: Esophageal perforation    Subjective:     HPI:  Patient had esophageal perforation with food particles including ibuprofen tablets in the pleural space on initial presentation.   Patient had esophageal rupture repair on  11/10 with bilateral chest tubes and KENNY drain for pneumoperitoneum.     Transferred here for concern that anastomosis is not healing and requiring a higher level of care.  CT with oral contrast confirms esophageal leak.    Notified thoracic surgery who requested AES place stent.  Both consultants notes reviewed.  Endoscopy with stent placement scheduled for 11/25    Overview/Hospital Course:  No notes on file    Interval History: CT overnight with esophageal leak.  Remains NPO.  Hemodynamically stable.  Plan for AES to perform endoscopy with Stent.  Requested a PEG as well.       Objective:     Vital Signs (Most Recent):  Temp: 98.4 °F (36.9 °C) (11/24/24 1100)  Pulse: 96 (11/24/24 1400)  Resp: (!) 35 (11/24/24 1400)  BP: 120/78 (11/24/24 1400)  SpO2: (!) 93 % (11/24/24 1400) Vital Signs (24h Range):  Temp:  [98.2 °F (36.8 °C)-98.5 °F (36.9 °C)] 98.4 °F (36.9 °C)  Pulse:  [87-96] 96  Resp:  [21-35] 35  SpO2:  [93 %-99 %] 93 %  BP: (105-146)/(67-94) 120/78     Weight: 78 kg (171 lb 15.3 oz)  Body mass index is 27.75 kg/m².      Intake/Output Summary (Last 24 hours) at 11/24/2024 1452  Last data filed at 11/24/2024 1400  Gross per 24 hour   Intake 1356.19 ml   Output 1815 ml   Net -458.81 ml        Physical Exam  Constitutional:       Appearance: He is ill-appearing.   HENT:      Head:      Comments: NGT with old dried blood    Eyes:      Pupils: Pupils are equal, round, and reactive to light.   Cardiovascular:       Rate and Rhythm: Normal rate and regular rhythm.   Pulmonary:      Effort: Pulmonary effort is normal.      Comments: Bilateral chest tubes without much drainage.      Musculoskeletal:         General: No swelling.      Cervical back: Normal range of motion and neck supple.   Skin:     General: Skin is warm and dry.   Neurological:      General: No focal deficit present.      Mental Status: He is alert.   Psychiatric:         Mood and Affect: Mood normal.           Review of Systems    Vents:       Lines/Drains/Airways       Central Venous Catheter Line  Duration             Percutaneous Central Line - Triple Lumen    Internal Jugular Right -- days              Drain  Duration                  Chest Tube   Left Midaxillary -- days         Chest Tube   Right Midaxillary -- days         Closed/Suction Drain   Right Abdomen Bulb -- days         Closed/Suction Drain  Left Abdomen Bulb -- days         NG/OG Tube   Bureau sump Right nostril -- days         Urethral Catheter 11/23/24 1313 Temperature probe 1 day              Peripheral Intravenous Line  Duration                  Peripheral IV - Single Lumen 11/23/24 0901 18 G 1 3/4 in No Left Antecubital 1 day                    Significant Labs:    CBC/Anemia Profile:  Recent Labs   Lab 11/23/24  0530 11/24/24  0311   WBC 19.34* 20.99*   HGB 8.4* 8.0*   HCT 26.5* 25.5*    381   MCV 83 85   RDW 20.9* 21.3*        Chemistries:  Recent Labs   Lab 11/23/24  0530 11/24/24  0311   * 147*   K 3.4* 3.7   * 113*   CO2 25 24   BUN 31* 27*   CREATININE 1.5* 1.6*   CALCIUM 8.6* 8.9   ALBUMIN 2.1* 1.9*   PROT 5.7* 5.8*   BILITOT 2.8* 3.3*   ALKPHOS 137 156*   ALT 26 55*   AST 71* 146*   MG 2.1 2.1   PHOS 3.0 3.9       Creatinine and sodium rising.      Significant Imaging:  I have reviewed all pertinent imaging results/findings within the past 24 hours.  Assessment/Plan:     Psychiatric  Alcohol use disorder  Out of window for DT's.  Monitor.      Pulmonary  Loculated pleural effusion  Bilateral chest tube placed for Borhaaves.  Afebrile.  Not draining.  Appreciate thoracic surgery recs.       Cardiac/Vascular  Acute systolic heart failure  Reported EF of 15% at outside facility.  Hemodynamically stable.  Will repeat echo to ensure that this was not sepsis induced.  Sepsis now resolved.     GI  * Esophageal perforation  Status post repair with bilateral chest tubes and peritoneal drains 11/10  Transferred to [facility] 11/24 for higher level of care and concern for esophageal leak confirmed with CT overnight.  AES to place stent in am. Also requested to place PEG    NGT in place.         Begin DVT prophylaxis.  Heparain x 2 prior to endoscopy  On PPI.  Will recommend PT/OT after stent placement.  Remain NPO until procedure, gentle diuresis.    Critical Care Time: 30 minutes  Critical secondary to Patient has a condition that poses threat to life and bodily function: Pulmonary Embolism      Critical care was time spent personally by me on the following activities: development of treatment plan with patient or surrogate and bedside caregivers, discussions with consultants, evaluation of patient's response to treatment, examination of patient, ordering and performing treatments and interventions, ordering and review of laboratory studies, ordering and review of radiographic studies, pulse oximetry, re-evaluation of patient's condition. This critical care time did not overlap with that of any other provider or involve time for any procedures.          Alma Rosa Breaux MD  Pulmonology  [facility] - Medical ICU

## 2024-11-25 ENCOUNTER — ANESTHESIA EVENT (OUTPATIENT)
Dept: ENDOSCOPY | Facility: HOSPITAL | Age: 69
End: 2024-11-25
Payer: MEDICARE

## 2024-11-25 ENCOUNTER — ANESTHESIA (OUTPATIENT)
Dept: ENDOSCOPY | Facility: HOSPITAL | Age: 69
End: 2024-11-25
Payer: MEDICARE

## 2024-11-25 PROCEDURE — 63600175 PHARM REV CODE 636 W HCPCS: Performed by: NURSE ANESTHETIST, CERTIFIED REGISTERED

## 2024-11-25 PROCEDURE — 25000003 PHARM REV CODE 250: Performed by: NURSE ANESTHETIST, CERTIFIED REGISTERED

## 2024-11-25 RX ORDER — PHENYLEPHRINE HYDROCHLORIDE 10 MG/ML
INJECTION INTRAVENOUS
Status: DISCONTINUED | OUTPATIENT
Start: 2024-11-25 | End: 2024-11-25

## 2024-11-25 RX ORDER — ETOMIDATE 2 MG/ML
INJECTION INTRAVENOUS
Status: DISCONTINUED | OUTPATIENT
Start: 2024-11-25 | End: 2024-11-25

## 2024-11-25 RX ORDER — ONDANSETRON HYDROCHLORIDE 2 MG/ML
INJECTION, SOLUTION INTRAVENOUS
Status: DISCONTINUED | OUTPATIENT
Start: 2024-11-25 | End: 2024-11-25

## 2024-11-25 RX ORDER — SUCCINYLCHOLINE CHLORIDE 20 MG/ML
INJECTION INTRAMUSCULAR; INTRAVENOUS
Status: DISCONTINUED | OUTPATIENT
Start: 2024-11-25 | End: 2024-11-25

## 2024-11-25 RX ORDER — ROCURONIUM BROMIDE 10 MG/ML
INJECTION, SOLUTION INTRAVENOUS
Status: DISCONTINUED | OUTPATIENT
Start: 2024-11-25 | End: 2024-11-25

## 2024-11-25 RX ORDER — FENTANYL CITRATE 50 UG/ML
INJECTION, SOLUTION INTRAMUSCULAR; INTRAVENOUS
Status: DISCONTINUED | OUTPATIENT
Start: 2024-11-25 | End: 2024-11-25

## 2024-11-25 RX ADMIN — PHENYLEPHRINE HYDROCHLORIDE 200 MCG: 10 INJECTION INTRAVENOUS at 02:11

## 2024-11-25 RX ADMIN — ONDANSETRON 4 MG: 2 INJECTION INTRAMUSCULAR; INTRAVENOUS at 03:11

## 2024-11-25 RX ADMIN — FENTANYL CITRATE 50 MCG: 50 INJECTION, SOLUTION INTRAMUSCULAR; INTRAVENOUS at 02:11

## 2024-11-25 RX ADMIN — ETOMIDATE 10 MG: 2 INJECTION INTRAVENOUS at 02:11

## 2024-11-25 RX ADMIN — SUGAMMADEX 200 MG: 100 INJECTION, SOLUTION INTRAVENOUS at 03:11

## 2024-11-25 RX ADMIN — SUCCINYLCHOLINE CHLORIDE 120 MG: 20 INJECTION, SOLUTION INTRAMUSCULAR; INTRAVENOUS at 02:11

## 2024-11-25 RX ADMIN — ROCURONIUM BROMIDE 50 MG: 10 INJECTION INTRAVENOUS at 02:11

## 2024-11-25 NOTE — ASSESSMENT & PLAN NOTE
Per chart review, drinks 2-3 bottles of wine and 3-4 beers per day. Last drink around 11/8/24. Treated with Ativan at [facility].   - Liver ultrasound --> without signs of portal HTN  - Monitor for s/s of withdrawal  - Seizure precautions  - CIWA  - Continue folic acid and thiamine

## 2024-11-25 NOTE — ANESTHESIA PROCEDURE NOTES
Intubation    Date/Time: 11/25/2024 2:11 PM    Performed by: Kiko Graf CRNA  Authorized by: Duc Barrientos MD    Intubation:     Induction:  Rapid sequence induction    Intubated:  Postinduction    Mask Ventilation:  Not attempted    Attempts:  1    Attempted By:  CRNA    Method of Intubation:  Video laryngoscopy    Blade:  Soto 3    Laryngeal View Grade: Grade IIA - cords partially seen      Difficult Airway Encountered?: No      Complications:  None    Airway Device:  Oral endotracheal tube    Airway Device Size:  7.5    Style/Cuff Inflation:  Cuffed (inflated to minimal occlusive pressure)    Tube secured:  22    Secured at:  The lips    Placement Verified By:  Capnometry and Fiber optic visualization    Complicating Factors:  None    Findings Post-Intubation:  BS equal bilateral and atraumatic/condition of teeth unchanged  Notes:      Limited jaw mobility, small mouth opening, poor dentition. Atraumatic intubation, 7.5 ETT, McGrath3, dentition unchanged.

## 2024-11-25 NOTE — ANESTHESIA PREPROCEDURE EVALUATION
[facility]  Anesthesia Pre-Operative Evaluation         Patient Name: Jose Hernández Sr.  YOB: 1955  MRN: [id number]    SUBJECTIVE:     Pre-operative Evaluation for Procedure(s) (LRB):  EGD (ESOPHAGOGASTRODUODENOSCOPY) (N/A)     11/25/2024    Jose Hernández Sr. is a 69 y.o. male with a PMHx significant for CHF (EF 20%), chronic NSAID usage, alcohol dependence, spinal stenosis, gout, HLD, HTN, hx of stroke, esophageal perforation with food particles including ibuprofen tablets in the pleural space on initial presentation s/p esophageal rupture repair on 11/10 with bilateral chest tubes and KENNY drain for pneumoperitoneum and planning for esophageal stent placement with GI.     Level of Care: MICU  Hemodynamic Status: Normotensive   Respiratory Status: RA  IV Access: RIJ Trialysis; PIV 18G x1  NPO Status: NPO    Previous Airway: None documented    LDA:   Percutaneous Central Line - Triple Lumen    Internal Jugular Right (Active)   Site Assessment No drainage;No redness 11/25/24 0300   Line Securement Device Secured with sutures 11/24/24 1505   Dressing Type CHG impregnated dressing/sponge;Transparent (Tegaderm) 11/25/24 0300   Dressing Status Clean;Dry;Intact 11/25/24 0300   Dressing Intervention Integrity maintained 11/25/24 0300   Distal Patency/Care flushed w/o difficulty 11/25/24 0300   Medial 1 Patency/Care flushed w/o difficulty 11/25/24 0300   Proximal 1 Patency/Care flushed w/o difficulty 11/25/24 0300   Waveform Not being transduced 11/25/24 0300   Number of days:             Peripheral IV - Single Lumen 11/23/24 0901 18 G 1 3/4 in No Left Antecubital (Active)   Site Assessment Clean;Dry;Intact 11/25/24 0300   Line Securement Device Secured with sutureless device 11/24/24 1505   Extremity Assessment Distal to IV No abnormal discoloration 11/25/24 0300   Line Status Saline locked 11/25/24 0300   Dressing Status Clean;Dry;Intact 11/25/24 0300   Dressing Intervention Integrity  maintained 11/25/24 0300   Dressing Change Due 11/27/24 11/24/24 1505   Site Change Due 11/27/24 11/24/24 1505   Reason Not Rotated Not due 11/25/24 0300   Number of days: 1            Chest Tube   Left Midaxillary (Active)   Chest Tube WDL WDL 11/24/24 1505   Function -20 cm H2O 11/25/24 0300   Air Leak/Fluctuation air leak not present 11/25/24 0300   Safety all connections secured;suction checked 11/24/24 1505   Securement tubing secured to body distal to insertion site w/ anchoring device 11/25/24 0300   Patency Intervention Tip/tilt 11/25/24 0300   Dressing Appearance w/ dried drainage;w/ moist drainage 11/23/24 2219   Dressing Care dressing changed;insertion site cleansed w/ sterile normal saline;occlusive sterile gauze dressing applied 11/23/24 2219   Site Assessment Dry;Clean;Intact 11/25/24 0400   Surrounding Skin Reddened 11/24/24 1505   Output (mL) 30 mL 11/23/24 1800   Number of days:             Chest Tube   Right Midaxillary (Active)   Chest Tube WDL L 11/24/24 1505   Function -20 cm H2O 11/25/24 0300   Air Leak/Fluctuation air leak not present 11/25/24 0300   Care physician notified 11/23/24 0533   Safety all connections secured 11/25/24 0300   Securement tubing secured to body distal to insertion site w/ anchoring device 11/25/24 0300   Patency Intervention Tip/tilt 11/25/24 0300   Dressing Appearance w/ dried drainage 11/23/24 2219   Dressing Care dressing changed;dressing removed;insertion site cleansed w/ sterile normal saline 11/23/24 2219   Site Assessment Tender 11/25/24 0300   Surrounding Skin Reddened 11/25/24 0300   Output (mL) 10 mL 11/23/24 1800   Number of days:             Closed/Suction Drain  Left Abdomen Bulb (Active)   Dressing Type Gauze 11/24/24 1505   Dressing Status Intact 11/25/24 0300   Dressing Intervention Integrity maintained 11/25/24 0300   Drainage Serosanguineous 11/25/24 0300   Status Open to gravity drainage 11/25/24 0300   Output (mL) 50 mL 11/23/24 2219   Number of  "days:             Closed/Suction Drain   Right Abdomen Bulb (Active)   Dressing Type Gauze 11/24/24 1505   Dressing Status Intact 11/25/24 0300   Dressing Intervention Integrity maintained 11/25/24 0300   Drainage Serosanguineous 11/25/24 0300   Status Open to gravity drainage 11/25/24 0300   Output (mL) 40 mL 11/23/24 2219   Number of days:             NG/OG Tube   [place] sump Right nostril (Active)   Tolerance no signs/symptoms of discomfort 11/24/24 1505   Securement secured to nostril center 11/24/24 1505   Clamp Status/Tolerance clamped 11/24/24 1505   Suction Setting/Drainage Method suction at the bedside 11/24/24 1505   Insertion Site Appearance no redness, warmth, tenderness, skin breakdown, drainage 11/24/24 1505   Number of days:             Urethral Catheter 11/23/24 1313 Temperature probe (Active)   Site Assessment Clean;Intact 11/24/24 1505   Collection Container Urimeter 11/24/24 1505   Securement Method secured to top of thigh w/ adhesive device 11/24/24 1505   Catheter Care Performed yes 11/24/24 1505   Reason for Continuing Urinary Catheterization Critically ill in ICU and requiring hourly monitoring of intake/output 11/24/24 1505   CAUTI Prevention Bundle Securement Device in place with 1" slack;Intact seal between catheter & drainage tubing;Drainage bag/urimeter off the floor;Sheeting clip in use;No dependent loops or kinks;Drainage bag/urimeter not overfilled (<2/3 full);Drainage bag/urimeter below bladder 11/24/24 1505   Output (mL) 100 mL 11/25/24 0600   Number of days: 1       Drips:     dextrose 5 % and 0.45 % NaCl with KCl 20 mEq   Intravenous Continuous 100 mL/hr at 11/25/24 0701 Rate Verify at 11/25/24 0701       Patient Active Problem List   Diagnosis    Severe Clostridium difficile infection    Essential hypertension    Hypokalemia    Normocytic anemia    History of CVA (cerebrovascular accident)    Hyperlipidemia    Postural dizziness with presyncope    Benign prostatic hyperplasia " without lower urinary tract symptoms    Alcohol use disorder    Esophageal perforation    Loculated pleural effusion    Sepsis without acute organ dysfunction    Acute systolic heart failure    ALFREDO (acute kidney injury)       Review of patient's allergies indicates:  No Known Allergies    Current Inpatient Medications:   folic acid (FOLVITE) IVPB  1 mg Intravenous Daily    meropenem IV (PEDS and ADULTS)  2 g Intravenous Q12H    micafungin  100 mg Intravenous Q24H    pantoprazole  40 mg Intravenous BID    thiamine (B-1) 500 mg in D5W 100 mL IVPB  500 mg Intravenous TID    Followed by    [START ON 11/26/2024] thiamine  100 mg Per NG tube Daily       Past Surgical History:   Procedure Laterality Date    HAND SURGERY      SKIN GRAFT         Social History     Substance and Sexual Activity   Drug Use Yes    Types: Marijuana     Tobacco Use: Low Risk  (8/5/2024)    Received from [facility] and Its Subsidiaries and Affiliates    Patient History     Smoking Tobacco Use: Never     Smokeless Tobacco Use: Never     Passive Exposure: Not on file     Alcohol Use: Alcohol Misuse (6/29/2023)    Received from [facility] and Its Subsidiaries and Affiliates    AUDIT-C     Frequency of Alcohol Consumption: 2-4 times a month     Average Number of Drinks: 1 or 2     Frequency of Binge Drinking: Monthly       OBJECTIVE:     Vital Signs Range (Last 24H):  Temp:  [36.8 °C (98.2 °F)-37.1 °C (98.8 °F)]   Pulse:  []   Resp:  [15-38]   BP: (105-138)/(67-88)   SpO2:  [92 %-99 %]       Significant Labs    Heme Profile  Lab Results   Component Value Date    WBC 18.58 (H) 11/25/2024    HGB 7.7 (L) 11/25/2024    HCT 23.9 (L) 11/25/2024     11/25/2024       Coagulation Studies  Lab Results   Component Value Date    LABPROT 14.7 (H) 11/23/2024    INR 1.4 (H) 11/23/2024    APTT 51.5 (H) 11/23/2024       Metabolic Profile  Lab Results   Component Value Date    NA  148 (H) 11/25/2024    K 3.5 11/25/2024     (H) 11/25/2024    CO2 25 11/25/2024    BUN 26 (H) 11/25/2024    CREATININE 1.8 (H) 11/25/2024    MG 2.1 11/25/2024    PHOS 3.3 11/25/2024       Liver Function Tests  Lab Results   Component Value Date     (H) 11/25/2024    ALT 66 (H) 11/25/2024    ALKPHOS 156 (H) 11/25/2024    BILITOT 2.8 (H) 11/25/2024    PROT 5.7 (L) 11/25/2024    ALBUMIN 1.7 (L) 11/25/2024       Lipid Profile  Lab Results   Component Value Date    CHOL 134 08/05/2024    HDL 62 08/05/2024    TRIG 75 08/05/2024       Endocrine Profile  Lab Results   Component Value Date    HGBA1C 5.9 09/29/2021    TSH 4.65 01/24/2023       Diagnostic Studies      EKG:   Results for orders placed or performed during the hospital encounter of 09/19/21   EKG 12-lead    Collection Time: 09/19/21  3:00 AM    Narrative    Test Reason : R42,R55,    Vent. Rate : 083 BPM     Atrial Rate : 083 BPM     P-R Int : 156 ms          QRS Dur : 082 ms      QT Int : 390 ms       P-R-T Axes : 045 -28 014 degrees     QTc Int : 458 ms    Normal sinus rhythm  Nonspecific T wave abnormality  No previous ECGs available  Confirmed by Christina Birmingham MD, Nimisha NGO ([id number]) on 9/27/2021 1:26:07 PM    Referred By: [email or web address]   SELF           Confirmed By:Nimisha Birmingham MD         ASSESSMENT/PLAN:     Jose Hernández Sr. is a 69 y.o. male with CHF (EF 20%), chronic NSAID usage, alcohol dependence, spinal stenosis, gout, HLD, HTN, hx of stroke, esophageal perforation with food particles including ibuprofen tablets in the pleural space on initial presentation s/p esophageal rupture repair on 11/10 with bilateral chest tubes and KENNY drain for pneumoperitoneum and planning for esophageal stent placement with GI.    Pre-op Assessment    I have reviewed the Patient Summary Reports.     I have reviewed the Nursing Notes. I have reviewed the NPO Status.   I have reviewed the Medications.     Review of Systems  Anesthesia Hx:   History of prior  surgery of interest to airway management or planning:             Social:  Alcohol Use       Cardiovascular:     Hypertension       CHF                                   Renal/:  Chronic Renal Disease                Neurological:   CVA                                        Physical Exam  General: Well nourished, Cooperative, Alert and Oriented    Dental:  Periodontal disease    Chest/Lungs:  Normal Respiratory Rate    Heart:  Rate: Normal        Anesthesia Plan  Type of Anesthesia, risks & benefits discussed:    Anesthesia Type: Gen ETT  Intra-op Monitoring Plan: Standard ASA Monitors  Post Op Pain Control Plan: multimodal analgesia and IV/PO Opioids PRN  Induction:  IV  Airway Plan: Direct and Video, Post-Induction  Informed Consent: Informed consent signed with the Patient and all parties understand the risks and agree with anesthesia plan.  All questions answered.   ASA Score: 3  Day of Surgery Review of History & Physical: H&P Update referred to the surgeon/provider.    Ready For Surgery From Anesthesia Perspective.     .

## 2024-11-25 NOTE — HOSPITAL COURSE
Mr. Hernández is a 69yoM with PMHx significant for alcohol use disorder, HLD, HTN, and CVA (on plavix) who presented as a transfer for perforated esophageal ulcer. Bilateral chest tubes and bilateral KENNY drains. CT Chest/Abd/Pelvis: Esophageal leak into the paraesophageal posterior mediastinum and likely bilateral pleural spaces. Multifocal ground-glass opacities concerning for acute infectious or inflammatory process.  Small basilar pleural effusions and mild right lower lobe consolidation. ID, Thoracic Surgery, AES consulted. On 11/25, AES successfully placed an esophageal stent. Long-term nutrition will need to be addressed. Overnight with increased WOB and intermittent hypoxia. Chest xray with worsening patchy areas of alveolar consolidation; BNP 1484 - responsive to lasix.Intubated 11/27. CT concerning for ARDS. On vasopressors. CT chest and abdomen with contrast completed without evidence of active esophageal leak. Patient now on two pressors with high ventilator requirements. Palliative care consulted for assistance with ongoing GOC discussions with family.    Yes

## 2024-11-25 NOTE — SUBJECTIVE & OBJECTIVE
Interval History: Remains NPO.  Hemodynamically stable.  Plan for AES to perform endoscopy with Stent.    Objective:     Vital Signs (Most Recent):  Temp: 98 °F (36.7 °C) (11/25/24 1101)  Pulse: 92 (11/25/24 1301)  Resp: (!) 22 (11/25/24 1301)  BP: 127/86 (11/25/24 1301)  SpO2: 96 % (11/25/24 1301) Vital Signs (24h Range):  Temp:  [98 °F (36.7 °C)-98.8 °F (37.1 °C)] 98 °F (36.7 °C)  Pulse:  [] 92  Resp:  [15-38] 22  SpO2:  [92 %-98 %] 96 %  BP: (119-138)/(73-88) 127/86     Weight: 77.6 kg (171 lb)  Body mass index is 27.6 kg/m².    Intake/Output Summary (Last 24 hours) at 11/25/2024 1320  Last data filed at 11/25/2024 1301  Gross per 24 hour   Intake 2514.27 ml   Output 1890 ml   Net 624.27 ml      Physical Exam  Constitutional:       Appearance: He is ill-appearing.   HENT:      Head:      Comments: NGT with old dried blood    Eyes:      Pupils: Pupils are equal, round, and reactive to light.   Cardiovascular:      Rate and Rhythm: Normal rate and regular rhythm.   Pulmonary:      Effort: Pulmonary effort is normal.      Comments: Bilateral chest tubes without much drainage.  Musculoskeletal:         General: No swelling.      Cervical back: Normal range of motion and neck supple.   Skin:     General: Skin is warm and dry.   Neurological:      General: No focal deficit present.      Mental Status: He is alert.   Psychiatric:         Mood and Affect: Mood normal.         Review of Systems   All other systems reviewed and are negative.    Vents:     Lines/Drains/Airways       Central Venous Catheter Line  Duration             Percutaneous Central Line - Triple Lumen    Internal Jugular Right -- days              Drain  Duration                  Chest Tube   Left Midaxillary -- days         Chest Tube   Right Midaxillary -- days         Closed/Suction Drain   Right Abdomen Bulb -- days         Closed/Suction Drain  Left Abdomen Bulb -- days         NG/OG Tube   [place] sump Right nostril -- days         Urethral  Catheter 11/23/24 1313 Temperature probe 2 days              Peripheral Intravenous Line  Duration                  Peripheral IV - Single Lumen 11/23/24 0901 18 G 1 3/4 in No Left Antecubital 2 days                  Significant Labs:    CBC/Anemia Profile:  Recent Labs   Lab 11/24/24 0311 11/25/24  0502   WBC 20.99* 18.58*   HGB 8.0* 7.7*   HCT 25.5* 23.9*    429   MCV 85 82   RDW 21.3* 21.6*        Chemistries:  Recent Labs   Lab 11/24/24 0311 11/25/24  0502   * 148*   K 3.7 3.5   * 115*   CO2 24 25   BUN 27* 26*   CREATININE 1.6* 1.8*   CALCIUM 8.9 8.5*   ALBUMIN 1.9* 1.7*   PROT 5.8* 5.7*   BILITOT 3.3* 2.8*   ALKPHOS 156* 156*   ALT 55* 66*   * 171*   MG 2.1 2.1   PHOS 3.9 3.3     Significant Imaging:  I have reviewed all pertinent imaging results/findings within the past 24 hours.

## 2024-11-25 NOTE — PROGRESS NOTES
Pharmacokinetic Assessment Follow Up: IV Vancomycin    Vancomycin serum concentration assessment(s):    The trough level was drawn correctly and can be used to guide therapy at this time. The measurement is above the desired definitive target range of 10 to 20 mcg/mL.    Of note, both load of 1500 mg and scheduled regimen of 1000 mg were started at the same time on 11/23. Duplicate orders stopped 1 hour into infusion. So got around 1700 mg load.    Vancomycin Regimen Plan:    Discontinue the scheduled vancomycin regimen and re-dose when the random level is less than 20 mcg/mL, next level to be drawn at 0300 on 11/26.    Drug levels (last 3 results):  Recent Labs   Lab Result Units 11/25/24  0502   Vancomycin-Trough ug/mL 30.7*       Pharmacy will continue to follow and monitor vancomycin.    Please contact pharmacy at extension [phone] for questions regarding this assessment.    Thank you for the consult,   Melissa Washington       Patient brief summary:  Jose Hernández Sr. is a 69 y.o. male initiated on antimicrobial therapy with IV Vancomycin for treatment of intra-abdominal infection      Drug Allergies:   Review of patient's allergies indicates:  No Known Allergies    Actual Body Weight:   78 kg     Renal Function:   Estimated Creatinine Clearance: 38.1 mL/min (A) (based on SCr of 1.8 mg/dL (H)).,     Dialysis Method (if applicable):  N/A    CBC (last 72 hours):  Recent Labs   Lab Result Units 11/23/24  0530 11/24/24  0311 11/25/24  0502   WBC K/uL 19.34* 20.99* 18.58*   Hemoglobin g/dL 8.4* 8.0* 7.7*   Hematocrit % 26.5* 25.5* 23.9*   Platelets K/uL 389 381 429   Gran % % 87.0* 82.7* 81.9*   Lymph % % 3.0* 3.7* 4.6*   Mono % % 7.0 11.9 11.1   Eosinophil % % 0.0 0.5 0.9   Basophil % % 0.0 0.3 0.5   Differential Method  Manual Automated Automated       Metabolic Panel (last 72 hours):  Recent Labs   Lab Result Units 11/23/24  0530 11/24/24  0311 11/25/24  0502   Sodium mmol/L 146* 147* 148*   Potassium mmol/L 3.4* 3.7 3.5    Chloride mmol/L 113* 113* 115*   CO2 mmol/L 25 24 25   Glucose mg/dL 111* 83 119*   BUN mg/dL 31* 27* 26*   Creatinine mg/dL 1.5* 1.6* 1.8*   Albumin g/dL 2.1* 1.9* 1.7*   Total Bilirubin mg/dL 2.8* 3.3* 2.8*   Alkaline Phosphatase U/L 137 156* 156*   AST U/L 71* 146* 171*   ALT U/L 26 55* 66*   Magnesium mg/dL 2.1 2.1 2.1   Phosphorus mg/dL 3.0 3.9 3.3       Vancomycin Administrations:  vancomycin given in the last 96 hours                     vancomycin (VANCOCIN) 1,000 mg in 0.9% NaCl 250 mL IVPB (admixture device) (mg) 1,000 mg New Bag 11/24/24 0935    vancomycin 1,500 mg in 0.9% NaCl 250 mL IVPB (admixture device) (mg) 1,500 mg New Bag 11/23/24 0847    vancomycin (VANCOCIN) 1,000 mg in 0.9% NaCl 250 mL IVPB (admixture device) (mg) 1,000 mg New Bag 11/23/24 0846                    Microbiologic Results:  Microbiology Results (last 7 days)       Procedure Component Value Units Date/Time    Blood culture [[id number]] Collected: 11/24/24 1635    Order Status: Completed Specimen: Blood from Peripheral, Upper Arm, Left Updated: 11/25/24 0115     Blood Culture, Routine No Growth to date    Blood culture [[id number]] Collected: 11/24/24 0840    Order Status: Completed Specimen: Blood Updated: 11/24/24 1515     Blood Culture, Routine No Growth to date    Clostridium difficile EIA [[id number]]     Order Status: No result Specimen: Stool

## 2024-11-25 NOTE — TRANSFER OF CARE
"Anesthesia Transfer of Care Note    Patient: Jose Hernández Sr.    Procedure(s) Performed: Procedure(s) (LRB):  EGD (ESOPHAGOGASTRODUODENOSCOPY) (N/A)    Patient location: ICU    Anesthesia Type: general    Transport from OR: Transported from OR on 6-10 L/min O2 by face mask with adequate spontaneous ventilation    Post pain: adequate analgesia    Post assessment: no apparent anesthetic complications and tolerated procedure well    Post vital signs: stable    Level of consciousness: awake    Nausea/Vomiting: no nausea/vomiting    Complications: none    Transfer of care protocol was followedComments: Report to RN @BS, Sats 94%.    Last vitals: Visit Vitals  /87   Pulse 91   Temp 36.7 °C (98 °F) (Oral)   Resp 18   Ht 5' 6" (1.676 m)   Wt 77.6 kg (171 lb)   SpO2 99%   BMI 27.60 kg/m²     "

## 2024-11-25 NOTE — PROGRESS NOTES
[facility] - Medical ICU  Infectious Disease  Progress Note    Patient Name: Jose Hernández Sr.  MRN: [id number]  Admission Date: 11/23/2024  Length of Stay: 2 days  Attending Physician: Faisal Mancera*  Primary Care Provider: Tong Cantrell MD    Isolation Status: No active isolations  Assessment/Plan:      GI  * Esophageal perforation  Jose Hernández is a 69 year old man with alcohol use disorder, CVA, cdiff (2021) and HTN transferred from [facility] for esophageal perforation. Found to have pneumoperitoneum/pneumomediastinum on imaging concerning for perforated viscus and loculated pleural effusion s/p robotic lap on 11/10. Had gangrenous thoracic esophagus/hiatal hernia, esophageal ulcer perforation that was repaired with omental patch (op note significant large intraabdominal/mediastinal pus with food stuffs/pills removed from mediastinum, s/p EGD with reported no evidence of intra-esophageal leak, no OR cx sent. Prior hospital course notable for shock, respiratry failure s/p intubation now extubated. Repeat CT c/w esophageal leak. Labs notable for persistent leukocytosis. Pending potential stent placement today. Blood cultures at [facility] and here are negative.      Recommendations:  - continue empiric vancomycin pharm to dose, meropenem 2g q12 (renal dosing), and micafungin 100 mg q24 hours   - anticipate 2-4 weeks empiric antibiotics         Above discussed with primary team.     Time: 50 minutes   50% of time spent on face-to-face counseling and coordination of care. Counseling included review of test results, diagnosis, and treatment plan with patient and/or family.  I have reviewed hospital notes from MICU service and other specialty providers as well as outside medical records. I have also reviewed CBC, CMP/BMP,  cultures and imaging with my interpretation as documented. Patient is high risk of morbidity, on antibiotics requiring intensive monitoring for toxicity.     Anticipated  Disposition:     Thank you for your consult. I will follow-up with patient. Please contact us if you have any additional questions.    Candy Baxter MD  Infectious Disease  [facility] - Medical ICU    Subjective:     Principal Problem:Esophageal perforation    HPI: 68 yo male with alcohol use, CVA, prior cdiff 2021 and HTN admitted for HLOC for esophageal perforation. At OSH, pt was found to have pneumoperitoneum/pneumomediastinum on imaging c/f perforated viscus and loculated pleural effusion s/p robotic lap on 11/10, found to have gangrenous thoracic esophagus/hiatal hernia, esophageal ulcer perforation that was repaired with omental patch (op note significant large intraabdominal/mediastinal pus with food stuff/pills removed from mediastinum, s/p EGD with reported no evidence of intra-esophageal leak. Prior hospital course notable for shock, respiratry failure s/p intubation now extubated. Pt is currently on vancomycin, meropenem, and micafungin. CTS and GI consulted.          Interval History: reports feeling okay, but intermittently somnolent.     Review of Systems   Reason unable to perform ROS: somnolence.     Objective:     Vital Signs (Most Recent):  Temp: 98.3 °F (36.8 °C) (11/25/24 0701)  Pulse: 92 (11/25/24 0901)  Resp: 12 (11/25/24 0901)  BP: 124/84 (11/25/24 0901)  SpO2: 97 % (11/25/24 0901) Vital Signs (24h Range):  Temp:  [98.2 °F (36.8 °C)-98.8 °F (37.1 °C)] 98.3 °F (36.8 °C)  Pulse:  [] 92  Resp:  [12-38] 12  SpO2:  [92 %-98 %] 97 %  BP: (105-138)/(73-88) 124/84     Weight: 77.6 kg (171 lb)  Body mass index is 27.6 kg/m².    Estimated Creatinine Clearance: 38 mL/min (A) (based on SCr of 1.8 mg/dL (H)).     Physical Exam  Vitals and nursing note reviewed.   Constitutional:       Appearance: He is ill-appearing.   HENT:      Head: Normocephalic.      Nose:      Comments: NG tube     Mouth/Throat:      Mouth: Mucous membranes are moist.   Pulmonary:      Effort: Respiratory distress present.       Comments: Bilateral chest tubes  Abdominal:      Palpations: Abdomen is soft.      Comments: Abdominal drains   Skin:     General: Skin is warm and dry.      Findings: No rash.   Neurological:      Mental Status: He is alert.          Significant Labs:   Microbiology Results (last 7 days)       Procedure Component Value Units Date/Time    Blood culture [[id number]] Collected: 11/24/24 0840    Order Status: Completed Specimen: Blood Updated: 11/25/24 1012     Blood Culture, Routine No Growth to date      No Growth to date    Blood culture [[id number]] Collected: 11/24/24 1635    Order Status: Completed Specimen: Blood from Peripheral, Upper Arm, Left Updated: 11/25/24 0115     Blood Culture, Routine No Growth to date    Clostridium difficile EIA [[id number]]     Order Status: Canceled Specimen: Stool             Significant Imaging: I have reviewed all pertinent imaging results/findings within the past 24 hours.

## 2024-11-25 NOTE — PLAN OF CARE
MICU DAILY GOALS     Family/Goals of care/Code Status   Code Status: Full Code    24H Vital Sign Range  Temp:  [98 °F (36.7 °C)-98.8 °F (37.1 °C)]   Pulse:  []   Resp:  [15-38]   BP: (103-138)/(67-88)   SpO2:  [92 %-99 %]      Shift Events (include procedures and significant events)   Esophageal stent placed today.    AWAKE RASS: Goal - RASS Goal: 0-->alert and calm  Actual - RASS ([place] Agitation-Sedation Scale): alert and calm    Restraint necessity: Not necessary   BREATHE SBT: Not intubated    Coordinate A & B, analgesics/sedatives Pain: managed   SAT: Not intubated   Delirium CAM-ICU: Overall CAM-ICU: Negative   Early(intubated/ Progressive (non-intubated) Mobility MOVE Screen (INTUBATED ONLY): Not intubated    Activity: Activity Management: Rolling - L1   Feeding/Nutrition Diet order: Diet/Nutrition Received: NPO,     Thrombus DVT prophylaxis: VTE Core Measure: Pharmacological prophylaxis initiated/maintained   HOB Elevation Head of Bed (HOB) Positioning: HOB at 30 degrees   Ulcer Prophylaxis GI: yes   Glucose control managed Glycemic Management: blood glucose monitored   Skin Skin assessment:     Sacrum intact/not altered? Yes  Heels intact/not altered? Yes  Surgical wound? Yes    CHECK ONE!   (no altered skin or altered skin) and sub boxes:  [] No Altered Skin Integrity Present    [x]Prevention Measures Documented    [x] Altered Skin Integrity Present or Discovered   [x] LDA present in EPIC, daily doc completed              [] LDA added if not in EPIC (describe wound).                    When describing wound, do not stage, use descriptive words only.    [] Wound Image Taken (required on admit,                   transfer/discharge and every Tuesday)    Wound Care Consulted? No   Bowel Function no issues    Indwelling Catheter Necessity      Urethral Catheter 11/23/24 1313 Temperature probe-Reason for Continuing Urinary Catheterization: Critically ill in ICU and requiring hourly monitoring of  intake/output  [REMOVED]      Urethral Catheter  -Reason for Continuing Urinary Catheterization: Critically ill in ICU and requiring hourly monitoring of intake/output    Percutaneous Central Line - Triple Lumen    Internal Jugular Right-Line Necessity Review: Poor venous access, Longterm central access required, Medication caustic to vasculature     De-escalation Antibiotics Yes        VS and assessment per flow sheet, patient progressing towards goals as tolerated, plan of care reviewed with Jose Hernández Sr., all concerns addressed, will continue to monitor.

## 2024-11-25 NOTE — ASSESSMENT & PLAN NOTE
Baseline Cr: 0.9.     Hypernatremia:  likely volume depletion with dehydration.  Patient NPO.     Adequate UOP, will continue to monitor.

## 2024-11-25 NOTE — ASSESSMENT & PLAN NOTE
CT scans concerning for loculated pleural effusions; bilateral large-bore chest tubes inserted prior to transfer. Draining to suction.  - CTS consulted; appreciate recs  - Maintain spO2 >90%

## 2024-11-25 NOTE — ASSESSMENT & PLAN NOTE
CT concerning for for multifocal PNA. Covered with Kade/Apple/Vanc.  - ID consulted; appreciate recs  - Continue BSA; Kade/Apple/Vanc

## 2024-11-25 NOTE — ANESTHESIA POSTPROCEDURE EVALUATION
Anesthesia Post Evaluation    Patient: Jose Hernández Sr.    Procedure(s) Performed: Procedure(s) (LRB):  EGD (ESOPHAGOGASTRODUODENOSCOPY) (N/A)    Final Anesthesia Type: general      Patient location during evaluation: ICU  Patient participation: Yes- Able to Participate  Level of consciousness: awake and alert  Post-procedure vital signs: reviewed and stable  Pain management: adequate  Airway patency: patent    PONV status at discharge: No PONV  Anesthetic complications: no      Cardiovascular status: blood pressure returned to baseline  Respiratory status: spontaneous ventilation and face mask (increased work of breathing)  Hydration status: euvolemic  Follow-up not needed.              Vitals Value Taken Time   /74 11/25/24 1601   Temp 37.0 11/25/24 1610   Pulse 91 11/25/24 1610   Resp 30 11/25/24 1610   SpO2 96 % 11/25/24 1610   Vitals shown include unfiled device data.      No case tracking events are documented in the log.      Pain/Becki Score: Pain Rating Prior to Med Admin: 3 (11/24/2024  9:57 PM)  Pain Rating Post Med Admin: 1 (11/24/2024 10:27 PM)

## 2024-11-25 NOTE — PROGRESS NOTES
[facility] - Medical ICU  Thoracic Surgery  Progress Note    Subjective:     History of Present Illness:  Mr. Hernández is a 69 yoM with PMH significant for chronic alcohol abuse and NSAID use who suffered a perforated esophageal ulcer and underwent promary repair with bilateral chest tube placement and bilateral intraabdominal drain placement on 11/10 at an [facility]. He was transferred here for concern of persistent leak and potential AES, IR, and CTS intervention. Of note, his EF was found to be 20% on [facility] echo. He was transferred to the MICU.     Post-Op Info:  Procedure(s) (LRB):  EGD (ESOPHAGOGASTRODUODENOSCOPY) (N/A)   Day of Surgery     Interval History:  No acute events overnight.  Going with AES for esophageal stent placement today.  WBC 18 today from 21.  Afebrile and hemodynamically stable.  On 2-4 L nasal cannula oxygen.  Bilateral chest tubes without air leak and minimal output.    Medications:  Continuous Infusions:   dextrose 5 % and 0.45 % NaCl with KCl 20 mEq   Intravenous Continuous 100 mL/hr at 11/25/24 0800 Rate Verify at 11/25/24 0800     Scheduled Meds:   folic acid (FOLVITE) IVPB  1 mg Intravenous Daily    meropenem IV (PEDS and ADULTS)  2 g Intravenous Q12H    micafungin  100 mg Intravenous Q24H    pantoprazole  40 mg Intravenous BID    thiamine (B-1) 500 mg in D5W 100 mL IVPB  500 mg Intravenous TID    Followed by    [START ON 11/26/2024] thiamine  100 mg Per NG tube Daily     PRN Meds:  Current Facility-Administered Medications:     HYDROmorphone, 0.5 mg, Intravenous, Q6H PRN    iohexol, 15 mL, Oral, PRN    ondansetron, 4 mg, Intravenous, Once PRN    sodium chloride 0.9%, 10 mL, Intravenous, PRN    Pharmacy to dose Vancomycin consult, , , Once **AND** vancomycin - pharmacy to dose, , Intravenous, pharmacy to manage frequency     Review of patient's allergies indicates:  No Known Allergies  Objective:     Vital Signs (Most Recent):  Temp: 98.3 °F (36.8 °C) (11/25/24 0701)  Pulse: 92 (11/25/24  0800)  Resp: (!) 25 (11/25/24 0800)  BP: 122/85 (11/25/24 0800)  SpO2: 97 % (11/25/24 0800) Vital Signs (24h Range):  Temp:  [98.2 °F (36.8 °C)-98.8 °F (37.1 °C)] 98.3 °F (36.8 °C)  Pulse:  [] 92  Resp:  [15-38] 25  SpO2:  [92 %-99 %] 97 %  BP: (105-138)/(70-88) 122/85     Intake/Output - Last 3 Shifts         11/23 0700 11/24 0659 11/24 0700 11/25 0659 11/25 0700 11/26 0659    P.O. 900      I.V. (mL/kg)  1291.4 (16.6) 178.3 (2.3)    IV Piggyback 793.1 914.9 49.4    Total Intake(mL/kg) 1693.1 (21.7) 2206.3 (28.3) 227.8 (2.9)    Urine (mL/kg/hr) 1534 (0.8) 1660 (0.9)     Drains 90      Stool 0 0     Chest Tube 40      Total Output 1664 1660     Net +29.1 +546.3 +227.8           Urine Occurrence  1 x     Stool Occurrence 2 x 1 x             SpO2: 97 %        Physical Exam  Vitals reviewed.   Cardiovascular:      Rate and Rhythm: Normal rate and regular rhythm.   Pulmonary:      Effort: Pulmonary effort is normal. No respiratory distress.      Comments: Bilateral chest tubes with minimal output and no air leak.   Abdominal:      Palpations: Abdomen is soft.      Tenderness: There is no abdominal tenderness.      Comments: Bilateral KENNY drains with serous output. Port site incisions healing well.   Skin:     General: Skin is warm.   Neurological:      General: No focal deficit present.      Mental Status: He is alert and oriented to person, place, and time.            Significant Labs:  CBC:   Recent Labs   Lab 11/25/24  0502   WBC 18.58*   RBC 2.90*   HGB 7.7*   HCT 23.9*      MCV 82   MCH 26.6*   MCHC 32.2     CMP:   Recent Labs   Lab 11/25/24  0502   *   CALCIUM 8.5*   ALBUMIN 1.7*   PROT 5.7*   *   K 3.5   CO2 25   *   BUN 26*   CREATININE 1.8*   ALKPHOS 156*   ALT 66*   *   BILITOT 2.8*       Significant Diagnostics:  I have reviewed all pertinent imaging results/findings within the past 24 hours.    VTE Risk Mitigation (From admission, onward)      None           Assessment/Plan:     * Esophageal perforation  69 yoM s/p primary repair [facility] perforation at [facility]. Transferred for concern of ongoing leak. No access to outside imaging at this time. Overall clinically stable in the MICU. Not on pressors. Minimal respiratory support. Case discussed with Dr. Goldberg and reviewed with family. Patient is quite frail with poor cardiac function and would not tolerate surgical intervention well, therefore minimally invasive procedures would be preferable.    - CT with oral contrast on 11/23 demonstrates persistent leak, going for stent today  - Patient is a very poor surgical candidate and has already failed suture repair  - No significant pleural fluid requiring extra drainage procedures at this time  - Care per MICU  - Thoracic surgery will follow        James Mendez MD  Thoracic Surgery  [facility] - Medical ICU

## 2024-11-25 NOTE — PLAN OF CARE
MICU DAILY GOALS     Family/Goals of care/Code Status   Code Status: Full Code    24H Vital Sign Range  Temp:  [98.4 °F (36.9 °C)-98.5 °F (36.9 °C)]   Pulse:  [87-98]   Resp:  [21-35]   BP: (105-132)/(67-87)   SpO2:  [93 %-99 %]      Shift Events (include procedures and significant events)   No acute events throughout shift    AWAKE RASS: Goal - RASS Goal: 0-->alert and calm  Actual - RASS ([place] Agitation-Sedation Scale): alert and calm    Restraint necessity: Not necessary   BREATHE SBT: Not intubated    Coordinate A & B, analgesics/sedatives Pain: managed   SAT: Not intubated   Delirium CAM-ICU: Overall CAM-ICU: Negative   Early(intubated/ Progressive (non-intubated) Mobility MOVE Screen (INTUBATED ONLY): Not intubated    Activity: Activity Management: Arm raise - L1, Rolling - L1   Feeding/Nutrition Diet order: Diet/Nutrition Received: NPO,     Thrombus DVT prophylaxis:     HOB Elevation Head of Bed (HOB) Positioning: HOB at 30-45 degrees   Ulcer Prophylaxis GI: yes   Glucose control managed Glycemic Management: blood glucose monitored   Skin Skin assessment:     Sacrum intact/not altered? Yes  Heels intact/not altered? Yes  Surgical wound? Yes    CHECK ONE!   (no altered skin or altered skin) and sub boxes:  [] No Altered Skin Integrity Present    []Prevention Measures Documented    [x] Altered Skin Integrity Present or Discovered   [x] LDA present in EPIC, daily doc completed              [] LDA added if not in EPIC (describe wound).                    When describing wound, do not stage, use descriptive words only.    [] Wound Image Taken (required on admit,                   transfer/discharge and every Tuesday)    Wound Care Consulted? Yes   Bowel Function no issues    Indwelling Catheter Necessity      Urethral Catheter 11/23/24 1313 Temperature probe-Reason for Continuing Urinary Catheterization: Critically ill in ICU and requiring hourly monitoring of intake/output  [REMOVED]      Urethral Catheter   -Reason for Continuing Urinary Catheterization: Critically ill in ICU and requiring hourly monitoring of intake/output          De-escalation Antibiotics Yes        VS and assessment per flow sheet, patient progressing towards goals as tolerated, plan of care reviewed with  patient and family , all concerns addressed, will continue to monitor.

## 2024-11-25 NOTE — SUBJECTIVE & OBJECTIVE
Interval History:  No acute events overnight.  Going with AES for esophageal stent placement today.  WBC 18 today from 21.  Afebrile and hemodynamically stable.  On 2-4 L nasal cannula oxygen.  Bilateral chest tubes without air leak and minimal output.    Medications:  Continuous Infusions:   dextrose 5 % and 0.45 % NaCl with KCl 20 mEq   Intravenous Continuous 100 mL/hr at 11/25/24 0800 Rate Verify at 11/25/24 0800     Scheduled Meds:   folic acid (FOLVITE) IVPB  1 mg Intravenous Daily    meropenem IV (PEDS and ADULTS)  2 g Intravenous Q12H    micafungin  100 mg Intravenous Q24H    pantoprazole  40 mg Intravenous BID    thiamine (B-1) 500 mg in D5W 100 mL IVPB  500 mg Intravenous TID    Followed by    [START ON 11/26/2024] thiamine  100 mg Per NG tube Daily     PRN Meds:  Current Facility-Administered Medications:     HYDROmorphone, 0.5 mg, Intravenous, Q6H PRN    iohexol, 15 mL, Oral, PRN    ondansetron, 4 mg, Intravenous, Once PRN    sodium chloride 0.9%, 10 mL, Intravenous, PRN    Pharmacy to dose Vancomycin consult, , , Once **AND** vancomycin - pharmacy to dose, , Intravenous, pharmacy to manage frequency     Review of patient's allergies indicates:  No Known Allergies  Objective:     Vital Signs (Most Recent):  Temp: 98.3 °F (36.8 °C) (11/25/24 0701)  Pulse: 92 (11/25/24 0800)  Resp: (!) 25 (11/25/24 0800)  BP: 122/85 (11/25/24 0800)  SpO2: 97 % (11/25/24 0800) Vital Signs (24h Range):  Temp:  [98.2 °F (36.8 °C)-98.8 °F (37.1 °C)] 98.3 °F (36.8 °C)  Pulse:  [] 92  Resp:  [15-38] 25  SpO2:  [92 %-99 %] 97 %  BP: (105-138)/(70-88) 122/85     Intake/Output - Last 3 Shifts         11/23 0700 11/24 0659 11/24 0700 11/25 0659 11/25 0700 11/26 0659    P.O. 900      I.V. (mL/kg)  1291.4 (16.6) 178.3 (2.3)    IV Piggyback 793.1 914.9 49.4    Total Intake(mL/kg) 1693.1 (21.7) 2206.3 (28.3) 227.8 (2.9)    Urine (mL/kg/hr) 1534 (0.8) 1660 (0.9)     Drains 90      Stool 0 0     Chest Tube 40      Total Output  1664 1660     Net +29.1 +546.3 +227.8           Urine Occurrence  1 x     Stool Occurrence 2 x 1 x             SpO2: 97 %        Physical Exam  Vitals reviewed.   Cardiovascular:      Rate and Rhythm: Normal rate and regular rhythm.   Pulmonary:      Effort: Pulmonary effort is normal. No respiratory distress.      Comments: Bilateral chest tubes with minimal output and no air leak.   Abdominal:      Palpations: Abdomen is soft.      Tenderness: There is no abdominal tenderness.      Comments: Bilateral KENNY drains with serous output. Port site incisions healing well.   Skin:     General: Skin is warm.   Neurological:      General: No focal deficit present.      Mental Status: He is alert and oriented to person, place, and time.            Significant Labs:  CBC:   Recent Labs   Lab 11/25/24  0502   WBC 18.58*   RBC 2.90*   HGB 7.7*   HCT 23.9*      MCV 82   MCH 26.6*   MCHC 32.2     CMP:   Recent Labs   Lab 11/25/24  0502   *   CALCIUM 8.5*   ALBUMIN 1.7*   PROT 5.7*   *   K 3.5   CO2 25   *   BUN 26*   CREATININE 1.8*   ALKPHOS 156*   ALT 66*   *   BILITOT 2.8*       Significant Diagnostics:  I have reviewed all pertinent imaging results/findings within the past 24 hours.    VTE Risk Mitigation (From admission, onward)      None

## 2024-11-25 NOTE — SUBJECTIVE & OBJECTIVE
Interval History: reports feeling okay, but intermittently somnolent.     Review of Systems   Reason unable to perform ROS: somnolence.     Objective:     Vital Signs (Most Recent):  Temp: 98.3 °F (36.8 °C) (11/25/24 0701)  Pulse: 92 (11/25/24 0901)  Resp: 12 (11/25/24 0901)  BP: 124/84 (11/25/24 0901)  SpO2: 97 % (11/25/24 0901) Vital Signs (24h Range):  Temp:  [98.2 °F (36.8 °C)-98.8 °F (37.1 °C)] 98.3 °F (36.8 °C)  Pulse:  [] 92  Resp:  [12-38] 12  SpO2:  [92 %-98 %] 97 %  BP: (105-138)/(73-88) 124/84     Weight: 77.6 kg (171 lb)  Body mass index is 27.6 kg/m².    Estimated Creatinine Clearance: 38 mL/min (A) (based on SCr of 1.8 mg/dL (H)).     Physical Exam  Vitals and nursing note reviewed.   Constitutional:       Appearance: He is ill-appearing.   HENT:      Head: Normocephalic.      Nose:      Comments: NG tube     Mouth/Throat:      Mouth: Mucous membranes are moist.   Pulmonary:      Effort: Respiratory distress present.      Comments: Bilateral chest tubes  Abdominal:      Palpations: Abdomen is soft.      Comments: Abdominal drains   Skin:     General: Skin is warm and dry.      Findings: No rash.   Neurological:      Mental Status: He is alert.          Significant Labs:   Microbiology Results (last 7 days)       Procedure Component Value Units Date/Time    Blood culture [[id number]] Collected: 11/24/24 0840    Order Status: Completed Specimen: Blood Updated: 11/25/24 1012     Blood Culture, Routine No Growth to date      No Growth to date    Blood culture [[id number]] Collected: 11/24/24 1635    Order Status: Completed Specimen: Blood from Peripheral, Upper Arm, Left Updated: 11/25/24 0115     Blood Culture, Routine No Growth to date    Clostridium difficile EIA [[id number]]     Order Status: Canceled Specimen: Stool             Significant Imaging: I have reviewed all pertinent imaging results/findings within the past 24 hours.

## 2024-11-25 NOTE — ASSESSMENT & PLAN NOTE
Reported EF of 15-20% at outside facility.  Hemodynamically stable.  Echo 11/25/24:    Left Ventricle: normal in size. Ventricular mass is normal. Normal wall thickness. There is concentric remodeling. Regional wall motion abnormalities present. Septal motion is abnormal. There is low normal systolic function with a visually estimated ejection fraction of 50 - 55%. There is indeterminate diastolic function.    Right Ventricle: Normal size and function    Left Atrium: Normal left atrial size.    Right Atrium: Normal - upper limits of normal right atrial size.    Aortic Valve: The aortic valve is a trileaflet valve. There is moderate aortic valve sclerosis. Aortic valve peak velocity is 1.4 m/s. Mean gradient is 5.9 mmHg.    Mitral Valve: The mitral valve is structurally normal. There is no significant regurgitation.    Tricuspid Valve: The tricuspid valve is structurally normal.    Pulmonic Valve: The pulmonic valve is structurally normal. There is no significant regurgitation.    Pulmonary Artery: The estimated pulmonary artery systolic pressure is 35 mmHg.    IVC/SVC: Normal venous pressure at 3 mmHg.

## 2024-11-25 NOTE — ASSESSMENT & PLAN NOTE
11/10/24: CT abdomen showed evidence of pneumomediastinum, pneumoperitoneum all concerning for perforated viscus.   - Robotic assisted laparoscopy performed. 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 Sicard).   - General surgery performed EGD immediately following laparoscopy. Report no evidence of intra-esophageal leakage. Bilateral KENNY drains placed.    11/23/24: Pt. Transferred to [facility] MICU for higher level of care and AES/CTS consults.  - CTS consulted; appreciate recs. They feel he is too frail to undergo surgical management  - AES consulted; appreciate recs. Plan for EGD and Stent placement today  - Continue protonix BID  - Monitor for bleeding; trend CBCs  - Transfuse for hgb <7  - Monitor KENNY drains

## 2024-11-25 NOTE — ASSESSMENT & PLAN NOTE
Jose Hernández is a 69 year old man with alcohol use disorder, CVA, cdiff (2021) and HTN transferred from [facility] for esophageal perforation. Found to have pneumoperitoneum/pneumomediastinum on imaging concerning for perforated viscus and loculated pleural effusion s/p robotic lap on 11/10. Had gangrenous thoracic esophagus/hiatal hernia, esophageal ulcer perforation that was repaired with omental patch (op note significant large intraabdominal/mediastinal pus with food stuffs/pills removed from mediastinum, s/p EGD with reported no evidence of intra-esophageal leak, no OR cx sent. Prior hospital course notable for shock, respiratry failure s/p intubation now extubated. Repeat CT c/w esophageal leak. Labs notable for persistent leukocytosis. Pending potential stent placement today. [place] cultures at [facility] and here are negative.      Recommendations:  - continue empiric vancomycin pharm to dose, meropenem 2g q12 (renal dosing), and micafungin 100 mg q24 hours   - anticipate 2-4 weeks empiric antibiotics

## 2024-11-25 NOTE — PROGRESS NOTES
Per chart screen, pt is a transfer from [facility] for HLOC/Cardiothoracic Surgery consult.  Pt is for an endoscopy with stent and possible PEG on 11/25.         Barrier:  not medically clear  Discharge plan A: pending clinical outcome.  PHILLIP: 12/2    CM will assess for discharge needs.     Tamiko Mercado RN CM  11/25/2024

## 2024-11-25 NOTE — ASSESSMENT & PLAN NOTE
69 yoM s/p primary repair of esophageal perforation at [facility] on 11/10. Transferred for concern of ongoing leak. No access to outside imaging at this time. Overall clinically stable in the MICU. Not on pressors. Minimal respiratory support. Case discussed with Dr. Goldberg and reviewed with family. Patient is quite frail with poor cardiac function and would not tolerate surgical intervention well, therefore minimally invasive procedures would be preferable.    - CT with oral contrast on 11/23 demonstrates persistent leak, going for stent today  - Patient is a very poor surgical candidate and has already failed suture repair  - No significant pleural fluid requiring extra drainage procedures at this time  - Care per MICU  - Thoracic surgery will follow

## 2024-11-25 NOTE — PROVATION PATIENT INSTRUCTIONS
Discharge Summary/Instructions after an Endoscopic Procedure  Patient Name: Jose Hernández  Patient MRN: [id number]  Patient YOB: 1955 Monday, November 25, 2024  Som Balbuena MD  Dear patient,  As a result of recent federal legislation (The Federal Cures Act), you may   receive lab or pathology results from your procedure in your [facility]   account before your physician is able to contact you. Your physician or   their representative will relay the results to you with their   recommendations at their soonest availability.  Thank you,  RESTRICTIONS:  During your procedure today, you received medications for sedation.  These   medications may affect your judgment, balance and coordination.  Therefore,   for 24 hours, you have the following restrictions:   - DO NOT drive a car, operate machinery, make legal/financial decisions,   sign important papers or drink alcohol.    ACTIVITY:  Today: no heavy lifting, straining or running due to procedural   sedation/anesthesia.  The following day: return to full activity including work.  DIET:  Eat and drink normally unless instructed otherwise.     TREATMENT FOR COMMON SIDE EFFECTS:  - Mild abdominal pain, nausea, belching, bloating or excessive gas:  rest,   eat lightly and use a heating pad.  - Sore Throat: treat with throat lozenges and/or gargle with warm salt   water.  - Because air was used during the procedure, expelling large amounts of air   from your rectum or belching is normal.  - If a bowel prep was taken, you may not have a bowel movement for 1-3 days.    This is normal.  SYMPTOMS TO WATCH FOR AND REPORT TO YOUR PHYSICIAN:  1. Abdominal pain or bloating, other than gas cramps.  2. Chest pain.  3. Back pain.  4. Signs of infection such as: chills or fever occurring within 24 hours   after the procedure.  5. Rectal bleeding, which would show as bright red, maroon, or black stools.   (A tablespoon of blood from the rectum is not serious, especially  if   hemorrhoids are present.)  6. Vomiting.  7. Weakness or dizziness.  GO DIRECTLY TO THE NEAREST EMERGENCY ROOM IF YOU HAVE ANY OF THE FOLLOWING:      Difficulty breathing              Chills and/or fever over 101 F   Persistent vomiting and/or vomiting blood   Severe abdominal pain   Severe chest pain   Black, tarry stools   Bleeding- more than one tablespoon   Any other symptom or condition that you feel may need urgent attention  Your doctor recommends these additional instructions:  If any biopsies were taken, your doctors clinic will contact you in 1 to 2   weeks with any results.  - Return patient to ICU for ongoing care.   - NPO.  - High dose PPI IV BID.  - Consider an esophagram to monitor for leaking at perforation site in about   5 days or otherwise directed by thoracic surgery (seal of esophageal mucosa   into partially uncovered portion to take at least a few days to form).  - Will need close follow up with thoracic surgery and AES to ensure adequate   nonsurgical management of large esophageal perforation moving forward.  - If no leak seen on future esophagram, could consider no more than a liquid   diet for nutrition. If enteral nutrition is desired, the patient does have   a known pyloric stenosis so could consider review with surgery for surgical   jejunostomy or IR guided G or GJ tube. Otherwise, patient might require   temporary TPN until its determined whether there is ongoing esophageal leak   or not.    - Repeat upper endoscopy with fluoro in 5-6 weeks at main [place] only for   partially covered esophageal stent removal/probable exchange for management   of large esophageal perforation; next exam not to exceed 5-6 weeks from   placement today 11/25/24. If the patient does not return for this, there is   ongoing future risk that the stent may not be removable due to ingrowth of   mucosa within the partially covered portion.  - Return to referring physician.  For questions, problems or results  please call your physician - Som Balbuena MD at Work:  [phone].  [facility], EMERGENCY ROOM PHONE NUMBER: [phone]  IF A COMPLICATION OR EMERGENCY SITUATION ARISES AND YOU ARE UNABLE TO REACH   YOUR PHYSICIAN - GO DIRECTLY TO THE EMERGENCY ROOM.  Som Balbuena MD  11/25/2024 3:21:45 PM  This report has been verified and signed electronically.  Dear patient,  As a result of recent federal legislation (The Federal Cures Act), you may   receive lab or pathology results from your procedure in your [facility]   account before your physician is able to contact you. Your physician or   their representative will relay the results to you with their   recommendations at their soonest availability.  Thank you,  PROVATION

## 2024-11-25 NOTE — PROGRESS NOTES
[facility] - Medical ICU  Critical Care Medicine  Progress Note    Patient Name: Jose Hernández Sr.  MRN: [id number]  Admission Date: 11/23/2024  Hospital Length of Stay: 2 days  Code Status: Full Code  Attending Provider: Faisal Mancera*  Primary Care Provider: Tong Cantrell MD   Principal Problem: Esophageal perforation    Subjective:     HPI:  Mr. Hernández is a 69yoM with a PMHx of alcohol use disorder, chronic back pain 2/2 to spinal stenosis, HLD, HTN, and CVA (on plavix) with no residual deficits who presented to OSH on 11/10/24 with epigastric pain and melena; found to have an esophageal perforation. The patient was initally hypotensive, anemic to 5.7, hyperbilirubinemia, lactic acidosis to 9.9. CTAP showed a pneumoperitoneum and pneumomediastinum consistent with a perforated viscus, hiatal hernia with surrounding external gas, and loculated pleural effusions. He was transfused and started on octreotide gtt, protonix, ativan, doxy, zosyn, hannah, vanc, precedex, with AC held. For the loculated pleural effusions, bilateral chest tubes were placed. Diagnostic laparoscopy showed hiatal hernia with gangrene in the lower thoracic esophagus and a perforated esophageal ulcer in the lower thoracic area, and mediastinal fluid pus, food, and pills. He underwent surgical repair on 11/10/24. Additionally, he was found to have HFrEF (EF = 20%).The patient was later extubated but then developed AMS. They repeated imaging with extraluminal air adjacent to the GE junction with increased fluid in the lower posterior mediastium concerning for recurrent esophageal perforation.   On 11/23/24, the patient was transferred to [facility] for AES and CTS consults s/t concern for perforated esophagus and loculated pleural effusions    Per chart review from OSH the patient stopped taking his medications a month before presentation because he thought they were making him weak. Other ROS positive for paleness for 3 weeks prior to  presentation. The patient provided supplemental history and reported that the patient was not eating or drinking except EtOH, drinks 2-3 bottles of wine and 2-4 beers per day.    Hospital/ICU Course:  11/23: CT Chest/Abd/Pelvis: Esophageal leak into the paraesophageal posterior mediastinum and likely bilateral pleural spaces. Multifocal ground-glass opacities concerning for acute infectious or inflammatory process.  Small basilar pleural effusions and mild right lower lobe consolidation. ID, Thoracic Surgery, AES consulted.  11/25: AES plans to place esophageal stent. Will need to discuss nutrition.    Interval History: Remains NPO.  Hemodynamically stable.  Plan for AES to perform endoscopy with Stent.    Objective:     Vital Signs (Most Recent):  Temp: 98 °F (36.7 °C) (11/25/24 1101)  Pulse: 92 (11/25/24 1301)  Resp: (!) 22 (11/25/24 1301)  BP: 127/86 (11/25/24 1301)  SpO2: 96 % (11/25/24 1301) Vital Signs (24h Range):  Temp:  [98 °F (36.7 °C)-98.8 °F (37.1 °C)] 98 °F (36.7 °C)  Pulse:  [] 92  Resp:  [15-38] 22  SpO2:  [92 %-98 %] 96 %  BP: (119-138)/(73-88) 127/86     Weight: 77.6 kg (171 lb)  Body mass index is 27.6 kg/m².    Intake/Output Summary (Last 24 hours) at 11/25/2024 1320  Last data filed at 11/25/2024 1301  Gross per 24 hour   Intake 2514.27 ml   Output 1890 ml   Net 624.27 ml      Physical Exam  Constitutional:       Appearance: He is ill-appearing.   HENT:      Head:      Comments: NGT with old dried blood    Eyes:      Pupils: Pupils are equal, round, and reactive to light.   Cardiovascular:      Rate and Rhythm: Normal rate and regular rhythm.   Pulmonary:      Effort: Pulmonary effort is normal.      Comments: Bilateral chest tubes without much drainage.  Musculoskeletal:         General: No swelling.      Cervical back: Normal range of motion and neck supple.   Skin:     General: Skin is warm and dry.   Neurological:      General: No focal deficit present.      Mental Status: He is alert.  "  Psychiatric:         Mood and Affect: Mood normal.         Review of Systems   All other systems reviewed and are negative.    Vents:     Lines/Drains/Airways       Central Venous Catheter Line  Duration             Percutaneous Central Line - Triple Lumen    Internal Jugular Right -- days              Drain  Duration                  Chest Tube   Left Midaxillary -- days         Chest Tube   Right Midaxillary -- days         Closed/Suction Drain   Right Abdomen Bulb -- days         Closed/Suction Drain  Left Abdomen Bulb -- days         NG/OG Tube   [place] sump Right nostril -- days         Urethral Catheter 11/23/24 1313 Temperature probe 2 days              Peripheral Intravenous Line  Duration                  Peripheral IV - Single Lumen 11/23/24 0901 18 G 1 3/4 in No Left Antecubital 2 days                  Significant Labs:    CBC/Anemia Profile:  Recent Labs   Lab 11/24/24  0311 11/25/24  0502   WBC 20.99* 18.58*   HGB 8.0* 7.7*   HCT 25.5* 23.9*    429   MCV 85 82   RDW 21.3* 21.6*        Chemistries:  Recent Labs   Lab 11/24/24 0311 11/25/24  0502   * 148*   K 3.7 3.5   * 115*   CO2 24 25   BUN 27* 26*   CREATININE 1.6* 1.8*   CALCIUM 8.9 8.5*   ALBUMIN 1.9* 1.7*   PROT 5.8* 5.7*   BILITOT 3.3* 2.8*   ALKPHOS 156* 156*   ALT 55* 66*   * 171*   MG 2.1 2.1   PHOS 3.9 3.3     Significant Imaging:  I have reviewed all pertinent imaging results/findings within the past 24 hours.    ABG  No results for input(s): "PH", "PO2", "PCO2", "HCO3", "BE" in the last 168 hours.  Assessment/Plan:     Neuro  History of CVA (cerebrovascular accident)  CVA in 2012. No residual deficits.   - Holding home plavix/ASA while determining pending surgical/endoscopic interventions   - Neuro checks    Psychiatric  Alcohol use disorder  Per chart review, drinks 2-3 bottles of wine and 3-4 beers per day. Last drink around 11/8/24. Treated with Ativan at OSH.   - Liver ultrasound --> without signs of portal " HTN  - Monitor for s/s of withdrawal  - Seizure precautions  - CIWA  - Continue folic acid and thiamine    Pulmonary  Loculated pleural effusion  CT scans concerning for loculated pleural effusions; bilateral large-bore chest tubes inserted prior to transfer. Draining to suction.  - CTS consulted; appreciate recs  - Maintain spO2 >90%    Cardiac/Vascular  Acute systolic heart failure  Reported EF of 15-20% at outside facility.  Hemodynamically stable.  Echo 11/25/24:    Left Ventricle: normal in size. Ventricular mass is normal. Normal wall thickness. There is concentric remodeling. Regional wall motion abnormalities present. Septal motion is abnormal. There is low normal systolic function with a visually estimated ejection fraction of 50 - 55%. There is indeterminate diastolic function.    Right Ventricle: Normal size and function    Left Atrium: Normal left atrial size.    Right Atrium: Normal - upper limits of normal right atrial size.    Aortic Valve: The aortic valve is a trileaflet valve. There is moderate aortic valve sclerosis. Aortic valve peak velocity is 1.4 m/s. Mean gradient is 5.9 mmHg.    Mitral Valve: The mitral valve is structurally normal. There is no significant regurgitation.    Tricuspid Valve: The tricuspid valve is structurally normal.    Pulmonic Valve: The pulmonic valve is structurally normal. There is no significant regurgitation.    Pulmonary Artery: The estimated pulmonary artery systolic pressure is 35 mmHg.    IVC/SVC: Normal venous pressure at 3 mmHg.    Hyperlipidemia  Takes statin at home; holding in the setting of previous transaminitis.  - Daily CMP  - Resume medications as appropriate    Essential hypertension  History of HTN, had stopped taking home medications about a month prior to admission at OSH.  - Holding home meds; resume as appropriate  - Maintain SBP <180    Renal/  ALFREDO (acute kidney injury)  Baseline Cr: 0.9.     Hypernatremia:  likely volume depletion with  dehydration.  Patient NPO.     Adequate UOP, will continue to monitor.    ID  Sepsis without acute organ dysfunction  CT concerning for for multifocal PNA. Covered with Kade/Apple/Vanc.  - ID consulted; appreciate recs  - Continue BSA; Kade/Apple/Vanc    GI  * Esophageal perforation  11/10/24: CT abdomen showed evidence of pneumomediastinum, pneumoperitoneum all concerning for perforated viscus.   - Robotic assisted laparoscopy performed. 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 Sicard).   - General surgery performed EGD immediately following laparoscopy. Report no evidence of intra-esophageal leakage. Bilateral KENNY drains placed.    11/23/24: Pt. Transferred to [facility] MICU for higher level of care and AES/CTS consults.  - CTS consulted; appreciate recs. They feel he is too frail to undergo surgical management  - AES consulted; appreciate recs. Plan for EGD and Stent placement today  - Continue protonix BID  - Monitor for bleeding; trend CBCs  - Transfuse for hgb <7  - Monitor KENNY drains       Critical Care Daily Checklist:    A: Awake: RASS Goal/Actual Goal: RASS Goal: 0-->alert and calm  Actual:     B: Spontaneous Breathing Trial Performed? NA   C: SAT & SBT Coordinated?  NA                      D: Delirium: CAM-ICU Overall CAM-ICU: Negative   E: Early Mobility Performed? No   F: Feeding Goal:    Status:     Current Diet Order   Procedures    Diet NPO      AS: Analgesia/Sedation PRN   T: Thromboembolic Prophylaxis Heparin (held for today's procedure)   H: HOB > 300 Yes   U: Stress Ulcer Prophylaxis (if needed) Protonix IV BID   G: Glucose Control SSI   B: Bowel Function Stool Occurrence: 0   I: Indwelling Catheter (Lines & Johansen) Necessity RIJ TLC - outside facility  Chest tube R/L - outside facility  Johansen 11/23   D: De-escalation of Antimicrobials/Pharmacotherapies ID Following    Plan for the day/ETD EGD with Stent palcement    Code  Status:  Family/Goals of Care: Full Code  Will need to update family     Critical Care Time: 40 minutes  Critical secondary to Patient has a condition that poses threat to life and bodily function: Acute Renal Failure and multi-organ failure      Critical care was time spent personally by me on the following activities: development of treatment plan with patient or surrogate and bedside caregivers, discussions with consultants, evaluation of patient's response to treatment, examination of patient, ordering and performing treatments and interventions, ordering and review of laboratory studies, ordering and review of radiographic studies, pulse oximetry, re-evaluation of patient's condition. This critical care time did not overlap with that of any other provider or involve time for any procedures.     Faisal Mancera MD  Critical Care Medicine  [facility] - Medical ICU

## 2024-11-25 NOTE — H&P
Short Stay Endoscopy History and Physical    PCP - Tong Cantrell MD  Referring Physician - Alma Rosa Breaux MD  [address] [place] [address]    Procedure - EGD  ASA - per anesthesia  Mallampati - per anesthesia  History of Anesthesia problems - per anesthesia  Family history Anesthesia problems -  per anesthesia   Plan of anesthesia - per anesthesia    HPI:  This is a 69 y.o. male here for evaluation of: EGD for eval / management of esophageal perforation s/p surgical repair elsewhere with ongoing leak/perforation; at least for esophageal stent placement.      ROS:  Constitutional: No fevers, chills  CV: No chest pain  Pulm: No cough, No shortness of breath  Ophtho: No vision changes  GI: see HPI  Derm: No rash    Medical History:  has a past medical history of Arthritis, Gout, Hyperlipemia, Hypertension, and Stroke (2012).    Surgical History:  has a past surgical history that includes Hand surgery and Skin graft.    Family History: family history includes Hypertension in his father; No Known Problems in his mother..    Social History:  reports that he has never smoked. He has never used smokeless tobacco. He reports current alcohol use. He reports current drug use. Drug: Marijuana.    Review of patient's allergies indicates:  No Known Allergies    Medications:   Medications Prior to Admission   Medication Sig Dispense Refill Last Dose/Taking    acetaminophen (TYLENOL) 650 MG TbSR Take 1 tablet by mouth once daily.       allopurinoL (ZYLOPRIM) 100 MG tablet Take 100 mg by mouth once daily.       amLODIPine (NORVASC) 10 MG tablet Take 1 tablet by mouth once daily.       aspirin (ECOTRIN) 81 MG EC tablet Take 1 tablet (81 mg total) by mouth once daily. 90 tablet 0     atorvastatin (LIPITOR) 80 MG tablet Take 1 tablet (80 mg total) by mouth once daily. 90 tablet 3     cholecalciferol, vitamin D3, 125 mcg (5,000 unit) Tab Take 1 tablet by mouth once daily.       clopidogreL (PLAVIX) 75 mg tablet  Take 1 capsule orally once a day. 90 tablet 0     cyanocobalamin 500 MCG tablet Take 1 tablet by mouth 2 (two) times daily.       diclofenac sodium (VOLTAREN) 1 % Gel Apply 2 g topically once daily. 60 g 3     DULoxetine (CYMBALTA) 30 MG capsule Take 30 mg by mouth once daily.       gabapentin (NEURONTIN) 800 MG tablet Take 1 tablet (800 mg total) by mouth once daily. 90 tablet 3     multivitamin capsule Take 1 capsule by mouth once daily.       pantoprazole (PROTONIX) 40 MG tablet Take 40 mg by mouth.       phenazopyridine HCl (URISTAT ORAL) Take by mouth.       pumpkin seed extract/soy germ (AZO BLADDER CONTROL ORAL) Take by mouth.       tamsulosin (FLOMAX) 0.4 mg Cap Take 1 capsule (0.4 mg total) by mouth once daily. 90 capsule 3     tiZANidine (ZANAFLEX) 4 MG tablet Take 4 mg by mouth.       traZODone (DESYREL) 50 MG tablet Take 1 tablet (50 mg total) by mouth every evening. 90 tablet 0     zolpidem (AMBIEN) 10 mg Tab Take 1 tablet (10 mg total) by mouth once daily. 30 tablet 0        Physical Exam:    Vital Signs:   Vitals:    11/25/24 1301   BP: 127/86   Pulse: 92   Resp: (!) 22   Temp:        General Appearance: Ill appearing in no acute distress    Labs:  Lab Results   Component Value Date    WBC 18.58 (H) 11/25/2024    HGB 7.7 (L) 11/25/2024    HCT 23.9 (L) 11/25/2024     11/25/2024    CHOL 134 08/05/2024    TRIG 75 08/05/2024    HDL 62 08/05/2024    ALT 66 (H) 11/25/2024     (H) 11/25/2024     (H) 11/25/2024    K 3.5 11/25/2024     (H) 11/25/2024    CREATININE 1.8 (H) 11/25/2024    BUN 26 (H) 11/25/2024    CO2 25 11/25/2024    TSH 4.65 01/24/2023    INR 1.4 (H) 11/23/2024    HGBA1C 5.9 09/29/2021       I have explained the risks and benefits of this endoscopic procedure to the patient including but not limited to bleeding, inflammation, infection, perforation, missing a lesion and death.      Som Balbuena MD

## 2024-11-26 ENCOUNTER — TELEPHONE (OUTPATIENT)
Dept: ENDOSCOPY | Facility: HOSPITAL | Age: 69
End: 2024-11-26
Payer: MEDICARE

## 2024-11-26 NOTE — SUBJECTIVE & OBJECTIVE
Interval History/Significant Events: No acute events overnight. Need to address long-term nutrition plan.    Review of Systems   Unable to perform ROS: Mental status change     Objective:     Vital Signs (Most Recent):  Temp: 99 °F (37.2 °C) (11/25/24 2300)  Pulse: 95 (11/26/24 0200)  Resp: (!) 25 (11/26/24 0334)  BP: (!) 140/84 (11/26/24 0200)  SpO2: (!) 94 % (11/26/24 0200) Vital Signs (24h Range):  Temp:  [98 °F (36.7 °C)-99 °F (37.2 °C)] 99 °F (37.2 °C)  Pulse:  [86-95] 95  Resp:  [16-37] 25  SpO2:  [94 %-99 %] 94 %  BP: (103-140)/(67-87) 140/84   Weight: 77.6 kg (171 lb)  Body mass index is 27.6 kg/m².      Intake/Output Summary (Last 24 hours) at 11/26/2024 0405  Last data filed at 11/26/2024 0200  Gross per 24 hour   Intake 2515.56 ml   Output 1950 ml   Net 565.56 ml          Physical Exam  Vitals and nursing note reviewed.   Constitutional:       Appearance: He is ill-appearing.   HENT:      Head:      Comments: NG tube     Mouth/Throat:      Mouth: Mucous membranes are dry.      Pharynx: Oropharynx is clear.   Eyes:      Extraocular Movements: Extraocular movements intact.      Pupils: Pupils are equal, round, and reactive to light.   Cardiovascular:      Rate and Rhythm: Regular rhythm. Tachycardia present.      Pulses: Normal pulses.      Heart sounds: Normal heart sounds.      Comments: ST  Pulmonary:      Effort: Tachypnea present.      Breath sounds: Normal breath sounds.      Comments: 5L NC O2; bilateral chest tubes to suction  Abdominal:      General: There is distension.      Palpations: Abdomen is soft.      Comments: Bilateral KENNY drains   Genitourinary:     Comments: Johansen  Musculoskeletal:         General: Normal range of motion.   Skin:     General: Skin is warm and dry.      Capillary Refill: Capillary refill takes less than 2 seconds.   Neurological:      General: No focal deficit present.      Mental Status: He is alert and easily aroused. He is disoriented.      GCS: GCS eye subscore is 3.  GCS verbal subscore is 4. GCS motor subscore is 6.   Psychiatric:         Attention and Perception: He is inattentive.         Speech: He is noncommunicative. Speech is delayed.         Behavior: Behavior is cooperative.          Vents:     Lines/Drains/Airways       Central Venous Catheter Line  Duration             Percutaneous Central Line - Triple Lumen    Internal Jugular Right -- days              Drain  Duration                  Chest Tube   Left Midaxillary -- days         Chest Tube   Right Midaxillary -- days         Closed/Suction Drain   Right Abdomen Bulb -- days         Closed/Suction Drain  Left Abdomen Bulb -- days         NG/OG Tube   [place] sump Right nostril -- days         Urethral Catheter 11/23/24 1313 Temperature probe 2 days              Peripheral Intravenous Line  Duration                  Peripheral IV - Single Lumen 11/23/24 0901 18 G 1 3/4 in No Left Antecubital 2 days                  Significant Labs:    CBC/Anemia Profile:  Recent Labs   Lab 11/25/24  0502   WBC 18.58*   HGB 7.7*   HCT 23.9*      MCV 82   RDW 21.6*        Chemistries:  Recent Labs   Lab 11/25/24  0502   *   K 3.5   *   CO2 25   BUN 26*   CREATININE 1.8*   CALCIUM 8.5*   ALBUMIN 1.7*   PROT 5.7*   BILITOT 2.8*   ALKPHOS 156*   ALT 66*   *   MG 2.1   PHOS 3.3       All pertinent labs within the past 24 hours have been reviewed.    Significant Imaging:  I have reviewed all pertinent imaging results/findings within the past 24 hours.

## 2024-11-26 NOTE — PLAN OF CARE
Recommendations     As able, TPN recommendations: 265g D, 100g AA + IV Lipids daily to provide 1801 kcals & 100 g of protein (GIR: 2.37).  RD to monitor & follow-up.     Goals: Meet % EEN, EPN by RD f/u date  Nutrition Goal Status: new  Communication of RD Recs: discussed on rounds   Saw you May 4 for trouble breathing, you gave her BREO and INCRUSE ELIP, SINGULAIR  UPDATE:  She is doing better but will need refill on INCRUSE sent to [facility]  Also, she is getting migraines since lastweek in right eye, please advise if from medicine

## 2024-11-26 NOTE — PROGRESS NOTES
Pharmacokinetic Assessment Follow Up: IV Vancomycin    Vancomycin Regimen Assessment/Plan:    - Vancomycin random level resulted as 20.2 mcg/mL, drawn ~ 22.5 hours the previous supra-therapeutic level. Goal 15-20 mcg/mL.   - Continue pulse dosing in the setting of ALFREDO.  - Administer vancomycin 500 mg x 1 dose.   - A random level is ordered with morning labs to assess clearance.  Pharmacy to re-dose based on level and renal function.    Drug levels (last 3 results):  Recent Labs   Lab Result Units 11/25/24 0502 11/26/24 0333   Vancomycin, Random ug/mL  --  20.2   Vancomycin-Trough ug/mL 30.7*  --        Pharmacy will continue to follow and monitor vancomycin.    Please contact pharmacy at extension [phone] for questions regarding this assessment.    Thank you for the consult,   Ewelina Pike, PharmD, BCCCP       Patient brief summary:  Jose Hernández Sr. is a 69 y.o. male initiated on antimicrobial therapy with IV Vancomycin for treatment of  esophageal perforation      Drug Allergies:   Review of patient's allergies indicates:  No Known Allergies    Actual Body Weight:   78 kg     Renal Function:   Estimated Creatinine Clearance: 38 mL/min (A) (based on SCr of 1.8 mg/dL (H)).,     Dialysis Method (if applicable):  N/A    CBC (last 72 hours):  Recent Labs   Lab Result Units 11/24/24 0311 11/25/24 0502 11/26/24  0333   WBC K/uL 20.99* 18.58* 18.11*   Hemoglobin g/dL 8.0* 7.7* 7.8*   Hematocrit % 25.5* 23.9* 24.3*   Platelets K/uL 381 429 417   Gran % % 82.7* 81.9* 82.4*   Lymph % % 3.7* 4.6* 4.8*   Mono % % 11.9 11.1 10.5   Eosinophil % % 0.5 0.9 1.0   Basophil % % 0.3 0.5 0.3   Differential Method  Automated Automated Automated       Metabolic Panel (last 72 hours):  Recent Labs   Lab Result Units 11/24/24 0311 11/25/24 0502 11/26/24  0333   Sodium mmol/L 147* 148* 148*   Potassium mmol/L 3.7 3.5 3.6   Chloride mmol/L 113* 115* 115*   CO2 mmol/L 24 25 24   Glucose mg/dL 83 119* 109   BUN mg/dL 27* 26* 21    Creatinine mg/dL 1.6* 1.8* 1.8*   Albumin g/dL 1.9* 1.7* 1.6*   Total Bilirubin mg/dL 3.3* 2.8* 1.9*   Alkaline Phosphatase U/L 156* 156* 145   AST U/L 146* 171* 106*   ALT U/L 55* 66* 46*   Magnesium mg/dL 2.1 2.1 2.0   Phosphorus mg/dL 3.9 3.3 2.8       Vancomycin Administrations:  vancomycin given in the last 96 hours                     vancomycin (VANCOCIN) 1,000 mg in 0.9% NaCl 250 mL IVPB (admixture device) (mg) 1,000 mg New Bag 11/24/24 0935    vancomycin 1,500 mg in 0.9% NaCl 250 mL IVPB (admixture device) (mg) 1,500 mg New Bag 11/23/24 0847    vancomycin (VANCOCIN) 1,000 mg in 0.9% NaCl 250 mL IVPB (admixture device) (mg) 1,000 mg New Bag 11/23/24 0846                    Microbiologic Results:  Microbiology Results (last 7 days)       Procedure Component Value Units Date/Time    Blood culture [[id number]] Collected: 11/24/24 0840    Order Status: Completed Specimen: Blood Updated: 11/26/24 1012     Blood Culture, Routine No Growth to date      No Growth to date      No Growth to date    Blood culture [[id number]] Collected: 11/24/24 1635    Order Status: Completed Specimen: Blood from Peripheral, Upper Arm, Left Updated: 11/25/24 1812     Blood Culture, Routine No Growth to date      No Growth to date    Clostridium difficile EIA [[id number]]     Order Status: Canceled Specimen: Stool

## 2024-11-26 NOTE — PROGRESS NOTES
Pharmacokinetic Sign-off: IV Vancomycin    Therapy with vancomycin complete and/or consult discontinued by provider.  Pharmacy will sign off, please re-consult as needed.    Melissa Washington, PharmD, BCPS  EXT [id number]

## 2024-11-26 NOTE — ASSESSMENT & PLAN NOTE
11/10/24: CT abdomen showed evidence of pneumomediastinum, pneumoperitoneum all concerning for perforated viscus.   - Robotic assisted laparoscopy performed. 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 Sicard).   - General surgery performed EGD immediately following laparoscopy. Report no evidence of intra-esophageal leakage. Bilateral KENNY drains placed.    11/23/24: Pt. Transferred to [facility] MICU for higher level of care and AES/CTS consults.    - CTS consulted; appreciate recs. They feel he is too frail to undergo surgical management  - AES consulted; appreciate recs. Esophageal stent placed successfully on 11/25  - Continue protonix BID  - Monitor for bleeding; trend CBCs  - Transfuse for hgb <7  - Monitor KENNY drains

## 2024-11-26 NOTE — ASSESSMENT & PLAN NOTE
Jose Hernández is a 69 year old man with alcohol use disorder, CVA, cdiff (2021) and hypertension transferred from [facility] for esophageal perforation. Found to have pneumoperitoneum/pneumomediastinum on imaging concerning for perforated viscus and loculated pleural effusion s/p robotic lap on 11/10. Had gangrenous thoracic esophagus/hiatal hernia, esophageal ulcer perforation that was repaired with omental patch (op note significant large intraabdominal/mediastinal pus with food stuffs/pills removed from mediastinum, s/p EGD with reported no evidence of intra-esophageal leak, no OR culture sent. Prior hospital course notable for shock, respiratry failure s/p intubation now extubated. Repeat CT c/w esophageal leak. Labs notable for persistent leukocytosis. Blood cultures at [facility] and here are negative. Had esophageal stent placement 11/25 with plan for esophagram on 11/29.      Recommendations:  - stop vancomycin  - start daptomycin 8mg/kg q24 hours  - continue meropenem 2g q12 (renal dosing), and micafungin 100 mg q24 hours until 12/9/24   - would repeat CT chest/abdomen/pelvis 12/9 to guide length of therapy

## 2024-11-26 NOTE — PROGRESS NOTES
[facility] - Medical ICU  Critical Care Medicine  Progress Note    Patient Name: Jose Hernández Sr.  MRN: [id number]  Admission Date: 11/23/2024  Hospital Length of Stay: 3 days  Code Status: Full Code  Attending Provider: Faisal Mancera*  Primary Care Provider: Tong Cantrell MD   Principal Problem: Esophageal perforation    Subjective:     HPI:  Mr. Hernández is a 69yoM with a PMHx of alcohol use disorder, chronic back pain 2/2 to spinal stenosis, HLD, HTN, and CVA (on plavix) with no residual deficits who presented to OSH on 11/10/24 with epigastric pain and melena; found to have an esophageal perforation. The patient was initally hypotensive, anemic to 5.7, hyperbilirubinemia, lactic acidosis to 9.9. CTAP showed a pneumoperitoneum and pneumomediastinum consistent with a perforated viscus, hiatal hernia with surrounding external gas, and loculated pleural effusions. He was transfused and started on octreotide gtt, protonix, ativan, doxy, zosyn, hannah, vanc, precedex, with AC held. For the loculated pleural effusions, bilateral chest tubes were placed. Diagnostic laparoscopy showed hiatal hernia with gangrene in the lower thoracic esophagus and a perforated esophageal ulcer in the lower thoracic area, and mediastinal fluid pus, food, and pills. He underwent surgical repair on 11/10/24. Additionally, he was found to have HFrEF (EF = 20%).The patient was later extubated but then developed AMS. They repeated imaging with extraluminal air adjacent to the GE junction with increased fluid in the lower posterior mediastium concerning for recurrent esophageal perforation.   On 11/23/24, the patient was transferred to [facility] for AES and CTS consults s/t concern for perforated esophagus and loculated pleural effusions    Per chart review from OSH the patient stopped taking his medications a month before presentation because he thought they were making him weak. Other ROS positive for paleness for 3 weeks prior to  presentation. The patient provided supplemental history and reported that the patient was not eating or drinking except EtOH, drinks 2-3 bottles of wine and 2-4 beers per day.    Hospital/ICU Course:  Mr. Hernández is a 69yoM with PMHx significant for alcohol use disorder, HLD, HTN, and CVA (on plavix) who presented as a transfer for perforated esophageal ulcer. Bilateral chest tubes and bilateral KENNY drains. CT Chest/Abd/Pelvis: Esophageal leak into the paraesophageal posterior mediastinum and likely bilateral pleural spaces. Multifocal ground-glass opacities concerning for acute infectious or inflammatory process.  Small basilar pleural effusions and mild right lower lobe consolidation. ID, Thoracic Surgery, AES consulted. On 11/25, AES successfully placed an esophageal stent. Long-term nutrition will need to be addressed.     Interval History/Significant Events: No acute events overnight. Need to address long-term nutrition plan.    Review of Systems   Unable to perform ROS: Mental status change     Objective:     Vital Signs (Most Recent):  Temp: 99 °F (37.2 °C) (11/25/24 2300)  Pulse: 95 (11/26/24 0200)  Resp: (!) 25 (11/26/24 0334)  BP: (!) 140/84 (11/26/24 0200)  SpO2: (!) 94 % (11/26/24 0200) Vital Signs (24h Range):  Temp:  [98 °F (36.7 °C)-99 °F (37.2 °C)] 99 °F (37.2 °C)  Pulse:  [86-95] 95  Resp:  [16-37] 25  SpO2:  [94 %-99 %] 94 %  BP: (103-140)/(67-87) 140/84   Weight: 77.6 kg (171 lb)  Body mass index is 27.6 kg/m².      Intake/Output Summary (Last 24 hours) at 11/26/2024 0405  Last data filed at 11/26/2024 0200  Gross per 24 hour   Intake 2515.56 ml   Output 1950 ml   Net 565.56 ml          Physical Exam  Vitals and nursing note reviewed.   Constitutional:       Appearance: He is ill-appearing.   HENT:      Head:      Comments: NG tube     Mouth/Throat:      Mouth: Mucous membranes are dry.      Pharynx: Oropharynx is clear.   Eyes:      Extraocular Movements: Extraocular movements intact.      Pupils:  Pupils are equal, round, and reactive to light.   Cardiovascular:      Rate and Rhythm: Regular rhythm. Tachycardia present.      Pulses: Normal pulses.      Heart sounds: Normal heart sounds.      Comments: ST  Pulmonary:      Effort: Tachypnea present.      Breath sounds: Normal breath sounds.      Comments: 5L NC O2; bilateral chest tubes to suction  Abdominal:      General: There is distension.      Palpations: Abdomen is soft.      Comments: Bilateral KENNY drains   Genitourinary:     Comments: Johansen  Musculoskeletal:         General: Normal range of motion.   Skin:     General: Skin is warm and dry.      Capillary Refill: Capillary refill takes less than 2 seconds.   Neurological:      General: No focal deficit present.      Mental Status: He is alert and easily aroused. He is disoriented.      GCS: GCS eye subscore is 3. GCS verbal subscore is 4. GCS motor subscore is 6.   Psychiatric:         Attention and Perception: He is inattentive.         Speech: He is noncommunicative. Speech is delayed.         Behavior: Behavior is cooperative.          Vents:     Lines/Drains/Airways       Central Venous Catheter Line  Duration             Percutaneous Central Line - Triple Lumen    Internal Jugular Right -- days              Drain  Duration                  Chest Tube   Left Midaxillary -- days         Chest Tube   Right Midaxillary -- days         Closed/Suction Drain   Right Abdomen Bulb -- days         Closed/Suction Drain  Left Abdomen Bulb -- days         NG/OG Tube   Martha sump Right nostril -- days         Urethral Catheter 11/23/24 1313 Temperature probe 2 days              Peripheral Intravenous Line  Duration                  Peripheral IV - Single Lumen 11/23/24 0901 18 G 1 3/4 in No Left Antecubital 2 days                  Significant Labs:    CBC/Anemia Profile:  Recent Labs   Lab 11/25/24  0502   WBC 18.58*   HGB 7.7*   HCT 23.9*      MCV 82   RDW 21.6*        Chemistries:  Recent Labs   Lab  "11/25/24  0502   *   K 3.5   *   CO2 25   BUN 26*   CREATININE 1.8*   CALCIUM 8.5*   ALBUMIN 1.7*   PROT 5.7*   BILITOT 2.8*   ALKPHOS 156*   ALT 66*   *   MG 2.1   PHOS 3.3       All pertinent labs within the past 24 hours have been reviewed.    Significant Imaging:  I have reviewed all pertinent imaging results/findings within the past 24 hours.    ABG  No results for input(s): "PH", "PO2", "PCO2", "HCO3", "BE" in the last 168 hours.  Assessment/Plan:     Neuro  History of CVA (cerebrovascular accident)  CVA in 2012. No residual deficits.     - Holding home plavix/ASA while determining pending surgical/endoscopic interventions   - Neuro checks    Psychiatric  Alcohol use disorder  Per chart review, drinks 2-3 bottles of wine and 3-4 beers per day. Last drink around 11/8/24. Treated with Ativan at OSH.     - Liver ultrasound --> without signs of portal HTN  - Monitor for s/s of withdrawal  - Seizure precautions  - CIWA  - Continue folic acid and thiamine    Pulmonary  Loculated pleural effusion  CT scans concerning for loculated pleural effusions; bilateral large-bore chest tubes inserted prior to transfer. Draining to suction.    - CTS consulted; appreciate recs  - Maintain spO2 >90%    Cardiac/Vascular  Acute systolic heart failure  Reported EF of 15-20% at outside facility.  Hemodynamically stable.  Echo 11/25/24:    Left Ventricle: normal in size. Ventricular mass is normal. Normal wall thickness. There is concentric remodeling. Regional wall motion abnormalities present. Septal motion is abnormal. There is low normal systolic function with a visually estimated ejection fraction of 50 - 55%. There is indeterminate diastolic function.    Right Ventricle: Normal size and function    Left Atrium: Normal left atrial size.    Right Atrium: Normal - upper limits of normal right atrial size.    Aortic Valve: The aortic valve is a trileaflet valve. There is moderate aortic valve sclerosis. Aortic " valve peak velocity is 1.4 m/s. Mean gradient is 5.9 mmHg.    Mitral Valve: The mitral valve is structurally normal. There is no significant regurgitation.    Tricuspid Valve: The tricuspid valve is structurally normal.    Pulmonic Valve: The pulmonic valve is structurally normal. There is no significant regurgitation.    Pulmonary Artery: The estimated pulmonary artery systolic pressure is 35 mmHg.    IVC/SVC: Normal venous pressure at 3 mmHg.    - Strict I/Os    Hyperlipidemia  Takes statin at home; holding in the setting of previous transaminitis.    - Daily CMP  - Resume medications as appropriate    Essential hypertension  History of HTN, had stopped taking home medications about a month prior to admission at OSH.    - Holding home meds; resume as appropriate  - Maintain SBP <180    Renal/  ALFREDO (acute kidney injury)  Admit creatinine 1.5. Baseline Cr: 0.9.    - Strict I/Os  - Daily CMP  - Avoid nephrotoxic agents  - Renally dose medications  - Hypernatremic; likely s/t volume depletion with dehydration    ID  Sepsis without acute organ dysfunction  CT concerning for for multifocal PNA. Covered with Kade/Apple/Vanc.    - ID consulted; appreciate recs  - Continue BSA; Kade/Apple/Vanc    GI  * Esophageal perforation  11/10/24: CT abdomen showed evidence of pneumomediastinum, pneumoperitoneum all concerning for perforated viscus.   - Robotic assisted laparoscopy performed. 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 Sicard).   - General surgery performed EGD immediately following laparoscopy. Report no evidence of intra-esophageal leakage. Bilateral KENNY drains placed.    11/23/24: Pt. Transferred to [facility] MICU for higher level of care and AES/CTS consults.    - CTS consulted; appreciate recs. They feel he is too frail to undergo surgical management  - AES consulted; appreciate recs. Esophageal stent placed successfully on 11/25  -  Continue protonix BID  - Monitor for bleeding; trend CBCs  - Transfuse for hgb <7  - Monitor KENNY drains       Critical Care Daily Checklist:    A: Awake: RASS Goal/Actual Goal: RASS Goal: 0-->alert and calm  Actual:     B: Spontaneous Breathing Trial Performed?     C: SAT & SBT Coordinated?  N/A                      D: Delirium: CAM-ICU Overall CAM-ICU: Negative   E: Early Mobility Performed? No   F: Feeding Goal:    Status:     Current Diet Order   Procedures    Diet NPO      AS: Analgesia/Sedation PRN   T: Thromboembolic Prophylaxis Holding in the setting of possible interventions   H: HOB > 300 Yes   U: Stress Ulcer Prophylaxis (if needed) PPI   G: Glucose Control 140-180   B: Bowel Function Stool Occurrence: 1   I: Indwelling Catheter (Lines & Johansen) Necessity Johansen, Bilateral chest tubes, Bilateral KENNY drains, PIVs, R. IJ central line, NG tube   D: De-escalation of Antimicrobials/Pharmacotherapies N/A - continue Kade/Apple/Vanc    Plan for the day/ETD Long term nutrition plans    Code Status:  Family/Goals of Care: Full Code       Critical Care Time: 50 minutes  Critical secondary to esophageal perforation post stent placement, loculated pleural effusions with bilateral chest tubes.    Full attestation from CCM attending to follow.     Critical care was time spent personally by me on the following activities: development of treatment plan with patient or surrogate and bedside caregivers, discussions with consultants, evaluation of patient's response to treatment, examination of patient, ordering and performing treatments and interventions, ordering and review of laboratory studies, ordering and review of radiographic studies, pulse oximetry, re-evaluation of patient's condition. This critical care time did not overlap with that of any other provider or involve time for any procedures.     Lavern Moreno, [facility]-BC  Critical Care Medicine  [facility] - Medical ICU

## 2024-11-26 NOTE — TREATMENT PLAN
AES Treatment Plan    Jose Hernández Sr. is a 69 y.o. male admitted to hospital 11/23/2024 (Hospital Day: 4) due to Esophageal perforation.     Interval History  NAEON. S/P EGD with stent placement yesterday. Patient denies fever, chills, significant abdominal pain, SOB, nausea, or vomiting. Receiving TPN.     WBC 18.11, Hb 7.8, T bili 2.8>1.9    Chest tube output 40 cc>>0cc.   Objective  Temp:  [98 °F (36.7 °C)-99.1 °F (37.3 °C)] 99.1 °F (37.3 °C) (11/26 0701)  Pulse:  [] 95 (11/26 1000)  BP: (103-150)/() 150/110 (11/26 1000)  Resp:  [18-45] 22 (11/26 1000)  SpO2:  [92 %-99 %] 97 % (11/26 1000)    General: Alert, Oriented x3, no distress  Chest: No respiratory distress. No crepitus on palpation of chest.   Abdomen: Non-distended. Normal tympany. Soft. Non-tender. No peritoneal signs.    Laboratory  Lab Results   Component Value Date    WBC 18.11 (H) 11/26/2024    HGB 7.8 (L) 11/26/2024    HCT 24.3 (L) 11/26/2024    MCV 84 11/26/2024     11/26/2024       Lab Results   Component Value Date    ALT 46 (H) 11/26/2024     (H) 11/26/2024    ALKPHOS 145 11/26/2024    BILITOT 1.9 (H) 11/26/2024       Assessment  70 yo M w/ CHF (EF 20%), chronic NSAID use, alcohol dependence, spinal stenosis, CVA who was admitted to outside hospital on 11/10/2024 with epigastric pain, nausea, vomiting, diarrhea, melena and found to have esophageal perforation status post surgery on 11/10. At the outside hospital patient's stay was complicated by acute hypoxic respiratory failure acute hemorrhagic shock and was admitted to the ICU intubated, on pressors, and broad-spectrum antibiotics. Since then has been extubated and off pressors. Now transferred to [facility] for further evaluation with concerns of consistent perforation.     Not a candidate for CT surgery. S/P EGD 11/26/24 which revealed large perforation in the distal esophagus at about 38-40cm from incisors - not amenable to endoscopic closure. 23mm wide x 10cm long  partially covered Agile esophageal stent.     Plan  - Please obtain esophagram (w water soluble contrast) on Friday.     - Repeat upper endoscopy with fluoro in 5-6 weeks for partially covered esophageal stent removal/probable exchange for management of large esophageal perforation.     - In the interim, continue TPN & strict NPO. If enteral nutrition is desired, the patient does have a known pyloric stenosis so could consider review with surgery for surgical jejunostomy or IR guided G or GJ tube. Otherwise, patient might require temporary TPN until its determined whether there is ongoing esophageal leak or not.     - Follow chest tube output. Will need close follow up with thoracic surgery and AES to ensure adequate nonsurgical management of large esophageal perforation moving forward.     - Continue broad spectrum IV abx & IV Diflucan.    -  PPI IV BID.     - Plan of care was discussed with primary team.    - We will continue to follow.    Thank you for involving us in the care of Jose Hernández Sr.. Please call with any additional questions, concerns or changes in the patient's clinical status.    Paul Jimenez MD, PGY-VI  Gastroenterology Fellow  [facility]

## 2024-11-26 NOTE — PROGRESS NOTES
[facility] - Medical ICU  Initial Discharge Assessment       Primary Care Provider: Tong Cantrell MD    Admission Diagnosis: Esophageal perforation [K22.3]    Admission Date: 11/23/2024  Expected Discharge Date: 12/2/2024         Payor: [employer]ET[employer] MANAGED MEDICARE / Plan: [employer] MEDICARE PLAN PPO / Product Type: Medicare Advantage /     Extended Emergency Contact Information  Primary Emergency Contact: Ofelia Hernández  Mobile Phone: [phone]  Relation: Daughter  Preferred language: English   needed? No  Secondary Emergency Contact: Edgar Jose Connor  Mobile Phone: [phone]  Relation: Son  Preferred language: English   needed? No    Discharge Plan A: Long-term acute care facility (LTAC)  Discharge Plan B: Rehab      [facility] AT Portal to Registered [facility] Sites  [facility]  [place] [address]  Phone: [phone] Fax: [phone]    [facility]  [address] [place] [address]  Phone: [phone] Fax: [phone]    [id number] [place], [place] - [address] AT [address]  [address]  [place] [address]  Phone: [phone] Fax: [phone]    [id number] - [place] - [address] AT [facility]  [address]  [place] [address]  Phone: [phone] Fax: [phone]    [id number] - [place], [place] - [address] AT [facility]  [address]  [place] [address]  Phone: [phone] Fax: [phone]      Initial Assessment (most recent)       Adult Discharge Assessment - 11/26/24 1639          Discharge Assessment    Assessment Type Discharge Planning Assessment     Source of Information health record     If unable to respond/provide information was family/caregiver contacted? Other (see comments)     Communicated PHILLIP with patient/caregiver Date not available/Unable to  determine     Reason For Admission Esophageal perforation     Facility Arrived From: [facility]     Prior to hospitilization cognitive status: Unable to Assess     Patient currently being followed by outpatient case management? Unable to determine (comments)     Do you take prescription medications? Yes     Do you have prescription coverage? Yes     Coverage [employer] Medicare     Is the patient taking medications as prescribed? no     Are you on dialysis? No     Discharge Plan A Long-term acute care facility (LTAC)     Discharge Plan B Rehab        Alcohol Use    Q1: How often do you have a drink containing alcohol? --   hx of AUD                       Initial assessment complete per chart screen.  Further assessment needed to plan for discharge needs.      Pt not medically clear for discharge.  PEG not placed. TPN, Bilateral chest tubes, bilateral KENNY drains, Dapto, Merrem continue.       Dispo plan A LTAC  Dispo plan B Rehab    Will discuss plan A and B with pt/family and interdisciplinary team.     Tamiko Mercado RN   11/26/2024

## 2024-11-26 NOTE — TREATMENT PLAN
Brief AES Treatment Plan    Impression on EGD:                          - A large non-bleeding perforation, with loose                          surgical sutures associated with it, was found in                          the distal esophagus at about 38-40cm from                          incisors - not amenable to endoscopic closure.                          Prosthesis placed (23mm wide x 10cm long partially                          covered Agile esophageal stent) - which will need                          to be removed/likely exchanged in no longer than                          5-6 weeks, maximum.                          - Gastric pyloric stenosis was found at the                          pylorus - unable to traverse with upper scope +                          distal cap, which is about 11mm diameter.                          - No specimens collected.     Recommendation:                          - Return patient to ICU for ongoing care.                          - NPO.                          - High dose PPI IV BID.                          - Consider an esophagram to monitor for leaking at                          perforation site in about 5 days or otherwise                          directed by thoracic surgery (seal of esophageal                          mucosa into partially uncovered portion to take at                          least a few days to form).                          - Will need close follow up with thoracic surgery                          and AES to ensure adequate nonsurgical management                          of large esophageal perforation moving forward.                          - If no leak seen on future esophagram, could                          consider no more than a liquid diet for nutrition.                          If enteral nutrition is desired, the patient does                          have a known pyloric stenosis so could consider                          review with surgery for  surgical jejunostomy or IR                          guided G or GJ tube. Otherwise, patient might                          require temporary TPN until its determined whether                          there is ongoing esophageal leak or not.                          - Repeat upper endoscopy with fluoro in 5-6 weeks                          at main campus only for partially covered                          esophageal stent removal/probable exchange for                          management of large esophageal perforation; next                          exam not to exceed 5-6 weeks from placement today                          11/25/24. If the patient does not return for this,                          there is ongoing future risk that the stent may                          not be removable due to ingrowth of mucosa within                          the partially covered portion.                          - Return to referring physician.     We will follow.     Paul Jimenez MD  PGY-VI, GI & Hepatology

## 2024-11-26 NOTE — ASSESSMENT & PLAN NOTE
Admit creatinine 1.5. Baseline Cr: 0.9.    - Strict I/Os  - Daily CMP  - Avoid nephrotoxic agents  - Renally dose medications  - Hypernatremic; likely s/t volume depletion with dehydration

## 2024-11-26 NOTE — ASSESSMENT & PLAN NOTE
Reported EF of 15-20% at outside facility.  Hemodynamically stable.  Echo 11/25/24:    Left Ventricle: normal in size. Ventricular mass is normal. Normal wall thickness. There is concentric remodeling. Regional wall motion abnormalities present. Septal motion is abnormal. There is low normal systolic function with a visually estimated ejection fraction of 50 - 55%. There is indeterminate diastolic function.    Right Ventricle: Normal size and function    Left Atrium: Normal left atrial size.    Right Atrium: Normal - upper limits of normal right atrial size.    Aortic Valve: The aortic valve is a trileaflet valve. There is moderate aortic valve sclerosis. Aortic valve peak velocity is 1.4 m/s. Mean gradient is 5.9 mmHg.    Mitral Valve: The mitral valve is structurally normal. There is no significant regurgitation.    Tricuspid Valve: The tricuspid valve is structurally normal.    Pulmonic Valve: The pulmonic valve is structurally normal. There is no significant regurgitation.    Pulmonary Artery: The estimated pulmonary artery systolic pressure is 35 mmHg.    IVC/SVC: Normal venous pressure at 3 mmHg.    - Strict I/Os

## 2024-11-26 NOTE — SUBJECTIVE & OBJECTIVE
Interval History: denies any new symptoms, intermittently somnolent during exam.     Review of Systems   Reason unable to perform ROS: mental status.     Objective:     Vital Signs (Most Recent):  Temp: 99.6 °F (37.6 °C) (11/26/24 1100)  Pulse: 109 (11/26/24 1300)  Resp: (!) 32 (11/26/24 1302)  BP: (!) 150/99 (11/26/24 1300)  SpO2: (!) 85 % (11/26/24 1300) Vital Signs (24h Range):  Temp:  [98.4 °F (36.9 °C)-99.6 °F (37.6 °C)] 99.6 °F (37.6 °C)  Pulse:  [] 109  Resp:  [18-45] 32  SpO2:  [85 %-99 %] 85 %  BP: (103-150)/() 150/99     Weight: 77.6 kg (171 lb 1.2 oz)  Body mass index is 27.61 kg/m².    Estimated Creatinine Clearance: 38 mL/min (A) (based on SCr of 1.8 mg/dL (H)).     Physical Exam  Vitals and nursing note reviewed.   Constitutional:       Appearance: He is ill-appearing.   HENT:      Head: Normocephalic.      Nose:      Comments: NG tube     Mouth/Throat:      Mouth: Mucous membranes are dry.   Pulmonary:      Effort: Pulmonary effort is normal. No respiratory distress.      Comments: Bilateral chest tubes   Abdominal:      General: There is no distension.      Palpations: Abdomen is soft.      Tenderness: There is no abdominal tenderness.   Musculoskeletal:         General: No swelling.   Skin:     General: Skin is warm and dry.      Findings: No rash.   Neurological:      Mental Status: He is disoriented.          Significant Labs:   Microbiology Results (last 7 days)       Procedure Component Value Units Date/Time    Blood culture [[id number]] Collected: 11/24/24 0840    Order Status: Completed Specimen: Blood Updated: 11/26/24 1012     Blood Culture, Routine No Growth to date      No Growth to date      No Growth to date    Blood culture [[id number]] Collected: 11/24/24 1635    Order Status: Completed Specimen: Blood from Peripheral, Upper Arm, Left Updated: 11/25/24 1812     Blood Culture, Routine No Growth to date      No Growth to date    Clostridium difficile EIA [[id number]]      Order Status: Canceled Specimen: Stool             Significant Imaging: I have reviewed all pertinent imaging results/findings within the past 24 hours.   Apixaban/Eliquis increases your risk for bleeding. Notify your doctor if you experience any of the following side effects: bleeding, coughing or vomiting blood, red or black stool, unexpected pain or swelling, itching or hives, chest pain, chest tightness, trouble breathing, changes in how much or how often you urinate, red or pink urine, numbness or tingling in your feet, or unusual muscle weakness. When Apixaban/Eliquis is taken with other medicines, they can affect how it works. Taking other medications such as aspirin, blood thinners, nonsteroidal anti-inflammatories, and medications that treat depression can increase your risk of bleeding. It is very important to tell your health care provider about all of the other medicines, including over-the-counter medications, herbs, and vitamins you are taking. DO NOT start, stop, or change the dosage of any medicine, including over-the-counter medicines, vitamins, and herbal products without your doctor’s approval. Any products containing aspirin or are nonsteroidal anti-inflammatories lessen the blood’s ability to form clots and add to the effect of Apixaban/Eliquis. Never take aspirin or medicines that contain aspirin without speaking to your doctor.

## 2024-11-26 NOTE — PROGRESS NOTES
"[facility] - Medical ICU  Adult Nutrition  Progress Note    SUMMARY       Recommendations    As able, TPN recommendations: 265g D, 100g AA + IV Lipids daily to provide 1801 kcals & 100 g of protein (GIR: 2.37).  RD to monitor & follow-up.    Goals: Meet % EEN, EPN by RD f/u date  Nutrition Goal Status: new  Communication of RD Recs: discussed on rounds    Assessment and Plan    Nutrition Problem:  Inadequate oral intake    Related to (etiology):   Inability to consume sufficient energy     Signs and Symptoms (as evidenced by):   NPO/PN    Interventions(treatment strategy):  Collaboration of nutrition care w/ other providers    Nutrition Diagnosis Status:   New      Reason for Assessment    Reason For Assessment: new TPN  Diagnosis: other (see comments) (Eso. perf; PHM: Etoh abuse)    General Information Comments: NPO w/ TPN/Lipids ordered. Disoriented - unsure of energy intake PTA. Per chart review, pt w/ stable weight of 172# x 22 months. Pt appears nourished w/ no indicators of malnutrition. S/p EGD w/ stent placement yesterday.  Nutrition Discharge Planning: Pending clinical course  SDOH: Unable to assess at this time.     Nutrition/Diet History    Food Allergies: NKFA  Factors Affecting Nutritional Intake: NPO    Anthropometrics    Temp: 99.1 °F (37.3 °C)  Height Method: Stated  Height: 5' 6" (167.6 cm)  Height (inches): 66 in  Weight Method: Bed Scale  Weight: 77.6 kg (171 lb 1.2 oz)  Weight (lb): 171.08 lb  Ideal Body Weight (IBW), Male: 142 lb  % Ideal Body Weight, Male (lb): 120.48 %  BMI (Calculated): 27.6  BMI Grade: 25 - 29.9 - overweight    Lab/Procedures/Meds    Pertinent Labs Reviewed: reviewed  Pertinent Labs Comments: Na 148, Creat 1.8, GFR 40.2,   Pertinent Medications Reviewed: reviewed  Pertinent Medications Comments: Folic acid, Thiamine    Estimated/Assessed Needs    Weight Used For Calorie Calculations: 77.6 kg (171 lb 1.2 oz)    Energy Calorie Requirements (kcal): 1855 " kcal/d  Energy Need Method: [place] (1.25 PAL)    Protein Requirements:  g/d (1.2-1.4 g/kg)  Weight Used For Protein Calculations: 77.6 kg (171 lb 1.2 oz)    Estimated Fluid Requirement Method: other (see comments) (Per MD)  RDA Method (mL): 1855    Nutrition Prescription Ordered    Current Diet Order: NPO  Current Nutrition Support Formula Ordered: Other (Comment) (TPN: 125g D, 90g AA @ 45 mL/hr + IV Lipids)    Evaluation of Received Nutrient/Fluid Intake    Parenteral Calories (kcal): 1285  Parenteral Protein (gm): 90  Parenteral Fluid (mL): 1080  Lipid Calories (kcals): 500 kcals  GIR (Glucose Infusion Rate) (mg/kg/min): 1.12 mg/kg/min    % Kcal Needs: 69%  % Protein Needs: 97%    I/O: +1L since admit    Energy Calories Required: not meeting needs  Protein Required: meeting needs  Fluid Required: other (see comments) (Per MD)    Comments: LBM: 11/25    Tolerance: tolerating    Nutrition Risk    Level of Risk/Frequency of Follow-up:  (1x/week)     Monitor and Evaluation    Food and Nutrient Intake: parenteral nutrition intake, enteral nutrition intake, food and beverage intake, energy intake  Food and Nutrient Adminstration: diet order, enteral and parenteral nutrition administration  Physical Activity and Function: nutrition-related ADLs and IADLs  Anthropometric Measurements: weight, weight change  Biochemical Data, Medical Tests and Procedures: glucose/endocrine profile, lipid profile, inflammatory profile, gastrointestinal profile, electrolyte and renal panel  Nutrition-Focused Physical Findings: overall appearance     Nutrition Follow-Up    RD Follow-up?: Yes

## 2024-11-26 NOTE — PLAN OF CARE
MICU DAILY GOALS     Family/Goals of care/Code Status   Code Status: Full Code    24H Vital Sign Range  Temp:  [98.4 °F (36.9 °C)-99.6 °F (37.6 °C)]   Pulse:  []   Resp:  [17-58]   BP: (111-150)/()   SpO2:  [83 %-100 %]      Shift Events (include procedures and significant events)   No acute events throughout shift PRNs given for pain. Increasing O2 requirements with pain.    AWAKE RASS: Goal - RASS Goal: 0-->alert and calm  Actual - RASS ([place] Agitation-Sedation Scale): drowsy    Restraint necessity: Not necessary   BREATHE SBT: Not intubated    Coordinate A & B, analgesics/sedatives Pain: managed   SAT: Not intubated   Delirium CAM-ICU: Overall CAM-ICU: Negative   Early(intubated/ Progressive (non-intubated) Mobility MOVE Screen (INTUBATED ONLY): Not intubated    Activity: Activity Management: Arm raise - L1, Rolling - L1   Feeding/Nutrition Diet order: Diet/Nutrition Received: NPO,     Thrombus DVT prophylaxis: VTE Core Measure: Pharmacological prophylaxis initiated/maintained   HOB Elevation Head of Bed (HOB) Positioning: HOB at 30 degrees   Ulcer Prophylaxis GI: yes   Glucose control managed Glycemic Management: blood glucose monitored   Skin Skin assessment:     Sacrum intact/not altered? Yes  Heels intact/not altered? Yes  Surgical wound? Yes    CHECK ONE!   (no altered skin or altered skin) and sub boxes:  [] No Altered Skin Integrity Present    []Prevention Measures Documented    [x] Altered Skin Integrity Present or Discovered   [x] LDA present in EPIC, daily doc completed              [] LDA added if not in EPIC (describe wound).                    When describing wound, do not stage, use descriptive words only.    [] Wound Image Taken (required on admit,                   transfer/discharge and every Tuesday)    Wound Care Consulted? Yes   Bowel Function no issues    Indwelling Catheter Necessity      Urethral Catheter 11/23/24 1313 Temperature probe-Reason for Continuing Urinary  Catheterization: Critically ill in ICU and requiring hourly monitoring of intake/output  [REMOVED]      Urethral Catheter  -Reason for Continuing Urinary Catheterization: Critically ill in ICU and requiring hourly monitoring of intake/output    Percutaneous Central Line - Triple Lumen    Internal Jugular Right-Line Necessity Review: Poor venous access     De-escalation Antibiotics Yes        VS and assessment per flow sheet, patient progressing towards goals as tolerated, plan of care reviewed with  patient and family , all concerns addressed, will continue to monitor.

## 2024-11-26 NOTE — PROGRESS NOTES
[facility] - Medical ICU  Infectious Disease  Progress Note    Patient Name: Jose Hernández Sr.  MRN: [id number]  Admission Date: 11/23/2024  Length of Stay: 3 days  Attending Physician: Faisal Mancera*  Primary Care Provider: Tong Cantrell MD    Isolation Status: No active isolations  Assessment/Plan:      GI  * Esophageal perforation  Jose Hernández is a 69 year old man with alcohol use disorder, CVA, cdiff (2021) and hypertension transferred from [facility] for esophageal perforation. Found to have pneumoperitoneum/pneumomediastinum on imaging concerning for perforated viscus and loculated pleural effusion s/p robotic lap on 11/10. Had gangrenous thoracic esophagus/hiatal hernia, esophageal ulcer perforation that was repaired with omental patch (op note significant large intraabdominal/mediastinal pus with food stuffs/pills removed from mediastinum, s/p EGD with reported no evidence of intra-esophageal leak, no OR culture sent. Prior hospital course notable for shock, respiratry failure s/p intubation now extubated. Repeat CT c/w esophageal leak. Labs notable for persistent leukocytosis. Blood cultures at [facility] and here are negative. Had esophageal stent placement 11/25 with plan for esophagram on 11/29.      Recommendations:  - stop vancomycin  - start daptomycin 8mg/kg q24 hours when when vancomycin random is therapeutic  - continue meropenem 2g q12 (renal dosing), and micafungin 100 mg q24 hours until 12/9/24   - would repeat CT chest/abdomen/pelvis 12/9 to guide length of therapy     Above discussed with primary team.     Time: 50 minutes   50% of time spent on face-to-face counseling and coordination of care. Counseling included review of test results, diagnosis, and treatment plan with patient and/or family.  I have reviewed hospital notes from  MICU service and other specialty providers as well as outside medical records. I have also reviewed CBC, CMP/BMP,  cultures and imaging  with my interpretation as documented. Patient is high risk of morbidity, on antibiotics requiring intensive monitoring for toxicity.     Anticipated Disposition: TBD    Thank you for your consult. I will sign off. Please contact us if you have any additional questions.    Candy Baxter MD  Infectious Disease  [facility] - Medical ICU    Subjective:     Principal Problem:Esophageal perforation    HPI: 68 yo male with alcohol use, CVA, prior cdiff 2021 and HTN admitted for HLOC for esophageal perforation. At OSH, pt was found to have pneumoperitoneum/pneumomediastinum on imaging c/f perforated viscus and loculated pleural effusion s/p robotic lap on 11/10, found to have gangrenous thoracic esophagus/hiatal hernia, esophageal ulcer perforation that was repaired with omental patch (op note significant large intraabdominal/mediastinal pus with food stuff/pills removed from mediastinum, s/p EGD with reported no evidence of intra-esophageal leak. Prior hospital course notable for shock, respiratry failure s/p intubation now extubated. Pt is currently on vancomycin, meropenem, and micafungin. CTS and GI consulted.          Interval History: denies any new symptoms, intermittently somnolent during exam.     Review of Systems   Reason unable to perform ROS: mental status.     Objective:     Vital Signs (Most Recent):  Temp: 99.6 °F (37.6 °C) (11/26/24 1100)  Pulse: 109 (11/26/24 1300)  Resp: (!) 32 (11/26/24 1302)  BP: (!) 150/99 (11/26/24 1300)  SpO2: (!) 85 % (11/26/24 1300) Vital Signs (24h Range):  Temp:  [98.4 °F (36.9 °C)-99.6 °F (37.6 °C)] 99.6 °F (37.6 °C)  Pulse:  [] 109  Resp:  [18-45] 32  SpO2:  [85 %-99 %] 85 %  BP: (103-150)/() 150/99     Weight: 77.6 kg (171 lb 1.2 oz)  Body mass index is 27.61 kg/m².    Estimated Creatinine Clearance: 38 mL/min (A) (based on SCr of 1.8 mg/dL (H)).     Physical Exam  Vitals and nursing note reviewed.   Constitutional:       Appearance: He is ill-appearing.   HENT:       Head: Normocephalic.      Nose:      Comments: NG tube     Mouth/Throat:      Mouth: Mucous membranes are dry.   Pulmonary:      Effort: Pulmonary effort is normal. No respiratory distress.      Comments: Bilateral chest tubes   Abdominal:      General: There is no distension.      Palpations: Abdomen is soft.      Tenderness: There is no abdominal tenderness.   Musculoskeletal:         General: No swelling.   Skin:     General: Skin is warm and dry.      Findings: No rash.   Neurological:      Mental Status: He is disoriented.          Significant Labs:   Microbiology Results (last 7 days)       Procedure Component Value Units Date/Time    Blood culture [[id number]] Collected: 11/24/24 0840    Order Status: Completed Specimen: Blood Updated: 11/26/24 1012     Blood Culture, Routine No Growth to date      No Growth to date      No Growth to date    Blood culture [[id number]] Collected: 11/24/24 1635    Order Status: Completed Specimen: Blood from Peripheral, Upper Arm, Left Updated: 11/25/24 1812     Blood Culture, Routine No Growth to date      No Growth to date    Clostridium difficile EIA [[id number]]     Order Status: Canceled Specimen: Stool             Significant Imaging: I have reviewed all pertinent imaging results/findings within the past 24 hours.

## 2024-11-27 PROBLEM — J96.01 ACUTE HYPOXIC RESPIRATORY FAILURE: Status: ACTIVE | Noted: 2024-11-27

## 2024-11-27 NOTE — ASSESSMENT & PLAN NOTE
CT concerning for for multifocal PNA. Covered with Kade/Apple/Vanc.    - ID consulted; appreciate recs  - Continue BSA; Kade/Apple  - Discontinued Vanc; Initiated Dapto  - Daily CBC

## 2024-11-27 NOTE — SUBJECTIVE & OBJECTIVE
Interval History/Significant Events: Overnight with increased WOB, tachypnea, and intermittent hypoxia. Repeat chest xray with worsening patchy areas of alveolar consolidation. Duoneb given for expiratory wheezing and lasix administered for BNP >1400. Precedex initiated for agitation.    Review of Systems   Unable to perform ROS: Mental status change     Objective:     Vital Signs (Most Recent):  Temp: 98.9 °F (37.2 °C) (11/26/24 2300)  Pulse: (!) 111 (11/27/24 0200)  Resp: (!) 50 (11/27/24 0200)  BP: 119/82 (11/27/24 0000)  SpO2: (!) 90 % (11/27/24 0200) Vital Signs (24h Range):  Temp:  [98.9 °F (37.2 °C)-99.6 °F (37.6 °C)] 98.9 °F (37.2 °C)  Pulse:  [] 111  Resp:  [17-58] 50  SpO2:  [83 %-100 %] 90 %  BP: (110-150)/() 119/82   Weight: 77.6 kg (171 lb 1.2 oz)  Body mass index is 27.61 kg/m².      Intake/Output Summary (Last 24 hours) at 11/27/2024 0519  Last data filed at 11/27/2024 0517  Gross per 24 hour   Intake 2135.73 ml   Output 3585 ml   Net -1449.27 ml          Physical Exam  Vitals and nursing note reviewed.   Constitutional:       General: He is in acute distress.      Appearance: He is cachectic. He is ill-appearing.   HENT:      Mouth/Throat:      Mouth: Mucous membranes are dry.      Pharynx: Oropharynx is clear.   Eyes:      Extraocular Movements: Extraocular movements intact.      Pupils: Pupils are equal, round, and reactive to light.   Neck:      Comments: RIJ TLC  Cardiovascular:      Rate and Rhythm: Regular rhythm. Tachycardia present.      Pulses: Normal pulses.      Heart sounds: Normal heart sounds.      Comments: ST  Pulmonary:      Effort: Tachypnea and respiratory distress present.      Breath sounds: Wheezing and rales present.      Comments: Bilateral chest tubes  Abdominal:      Palpations: Abdomen is soft.      Comments: Bilateral KENNY drains   Genitourinary:     Comments: Johansen  Musculoskeletal:         General: Normal range of motion.   Skin:     General: Skin is warm and  dry.      Capillary Refill: Capillary refill takes less than 2 seconds.   Neurological:      General: No focal deficit present.      Mental Status: He is easily aroused. He is lethargic and disoriented.      GCS: GCS eye subscore is 3. GCS verbal subscore is 4. GCS motor subscore is 6.   Psychiatric:         Attention and Perception: He is inattentive.         Speech: Speech is delayed.         Behavior: Behavior is cooperative.          Vents:     Lines/Drains/Airways       Central Venous Catheter Line  Duration             Percutaneous Central Line - Triple Lumen    Internal Jugular Right -- days              Drain  Duration                  Chest Tube   Left Midaxillary -- days         Chest Tube   Right Midaxillary -- days         Closed/Suction Drain   Right Abdomen Bulb -- days         Closed/Suction Drain  Left Abdomen Bulb -- days         Urethral Catheter 11/23/24 1313 Temperature probe 3 days              Peripheral Intravenous Line  Duration                  Peripheral IV - Single Lumen 11/23/24 0901 18 G 1 3/4 in No Left Antecubital 3 days                  Significant Labs:    CBC/Anemia Profile:  Recent Labs   Lab 11/26/24  0333 11/27/24  0320   WBC 18.11* 25.79*   HGB 7.8* 8.3*   HCT 24.3* 26.4*    395   MCV 84 85   RDW 22.0* 21.7*        Chemistries:  Recent Labs   Lab 11/26/24  0333 11/27/24  0320   * 151*   K 3.6 3.6   * 106   CO2 24 30*   BUN 21 32*   CREATININE 1.8* 1.7*   CALCIUM 8.1* 8.7   ALBUMIN 1.6* 1.6*   PROT 5.8* 6.5   BILITOT 1.9* 1.9*   ALKPHOS 145 156*   ALT 46* 37   * 71*   MG 2.0 2.1   PHOS 2.8 3.5       All pertinent labs within the past 24 hours have been reviewed.    Significant Imaging:  I have reviewed all pertinent imaging results/findings within the past 24 hours.

## 2024-11-27 NOTE — ASSESSMENT & PLAN NOTE
11/10/24: CT abdomen showed evidence of pneumomediastinum, pneumoperitoneum all concerning for perforated viscus.   - Robotic assisted laparoscopy performed. 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 Sicard).   - General surgery performed EGD immediately following laparoscopy. Report no evidence of intra-esophageal leakage. Bilateral KENNY drains placed.    11/23/24: Pt. Transferred to [facility] MICU for higher level of care and AES/CTS consults.    - CTS consulted; appreciate recs. They feel he is too frail to undergo surgical management  - AES consulted; appreciate recs. Esophageal stent placed successfully on 11/25  - Continue protonix BID  - Monitor for bleeding; trend CBCs  - Transfuse for hgb <7  - Monitor KENNY drains

## 2024-11-27 NOTE — ASSESSMENT & PLAN NOTE
Per chart review, drinks 2-3 bottles of wine and 3-4 beers per day. Last drink around 11/8/24. Treated with Ativan at OSH.     - Liver ultrasound; without signs of portal HTN  - Monitor for s/s of withdrawal  - Seizure precautions  - CIWA  - Continue folic acid and thiamine

## 2024-11-27 NOTE — CARE UPDATE
At this time, CT CAP with IV contrast is necessary. The benefits of the scan outweigh the risks as Mr. Hernández's treatment options are limited. Attempts made to contact family were unsuccessful. Plan discussed with Dr. Mancera, who is in agreement.     Ambreen Spear DNP, [facility]  Critical Care Medicine  11/27/2024 11:33 AM

## 2024-11-27 NOTE — PROGRESS NOTES
[facility] - Medical ICU  Thoracic Surgery  Progress Note    Subjective:     History of Present Illness:  Mr. Hernández is a 69 yoM with PMH significant for chronic alcohol abuse and NSAID use who suffered a perforated esophageal ulcer and underwent promary repair with bilateral chest tube placement and bilateral intraabdominal drain placement on 11/10 at an [facility]. He was transferred here for concern of persistent leak and potential AES, IR, and CTS intervention. Of note, his EF was found to be 20% on [facility] echo. He was transferred to the MICU.     Post-Op Info:  Procedure(s) (LRB):  EGD (ESOPHAGOGASTRODUODENOSCOPY) (N/A)   2 Days Post-Op     Interval History:  Overnight has had increasing respiratory requirements. minimal output from right-sided chest tube.  Some serosanguineous output from left-sided chest tube.  Questionable seropurulent output from KENNY drain per nursing.  Remains afebrile, WBC is 25 from 18 yesterday.    Medications:  Continuous Infusions:   dexmedeTOMIDine (Precedex) infusion (titrating)  0-1.4 mcg/kg/hr Intravenous Continuous 11.64 mL/hr at 11/27/24 1000 0.6 mcg/kg/hr at 11/27/24 1000    TPN ADULT CENTRAL LINE CUSTOM   Intravenous Continuous 60 mL/hr at 11/27/24 1000 Rate Verify at 11/27/24 1000    TPN ADULT CENTRAL LINE CUSTOM   Intravenous Continuous         Scheduled Meds:   DAPTOmycin (CUBICIN) IV (PEDS and ADULTS)  8 mg/kg Intravenous Q24H    fat emulsion 20%  250 mL Intravenous Daily    furosemide (LASIX) injection  80 mg Intravenous Q8H    heparin (porcine)  5,000 Units Subcutaneous Q8H    meropenem IV (PEDS and ADULTS)  2 g Intravenous Q12H    micafungin  100 mg Intravenous Q24H    pantoprazole  40 mg Intravenous BID     PRN Meds:  Current Facility-Administered Medications:     HYDROmorphone, 0.5 mg, Intravenous, Q6H PRN    iohexol, 15 mL, Oral, PRN    sodium chloride 0.9%, 10 mL, Intravenous, PRN     Review of patient's allergies indicates:  No Known Allergies  Objective:     Vital Signs (Most  Recent):  Temp: 99.4 °F (37.4 °C) (11/27/24 0701)  Pulse: 96 (11/27/24 1000)  Resp: (!) 44 (11/27/24 1000)  BP: 108/73 (11/27/24 1000)  SpO2: 96 % (11/27/24 1000) Vital Signs (24h Range):  Temp:  [98.6 °F (37 °C)-99.4 °F (37.4 °C)] 99.4 °F (37.4 °C)  Pulse:  [] 96  Resp:  [17-58] 44  SpO2:  [83 %-100 %] 96 %  BP: ()/(65-99) 108/73     Intake/Output - Last 3 Shifts         11/25 0700 11/26 0659 11/26 0700  11/27 0659 11/27 0700  11/28 0659    I.V. (mL/kg) 1289.5 (16.6) 44.8 (0.6) 42.5 (0.5)    IV Piggyback 544.7 689.6 177.7    .6 1444.2 304.7    Total Intake(mL/kg) 2379.8 (30.7) 2178.6 (28.1) 524.8 (6.8)    Urine (mL/kg/hr) 1525 (0.8) 4085 (2.2) 574 (1.6)    Drains 465 50     Stool 0 0     Chest Tube 0      Total Output 1990 4135 574    Net +389.8 -1956.4 -49.2           Stool Occurrence 1 x 1 x             SpO2: 96 %        Physical Exam  Vitals and nursing note reviewed.   Constitutional:       General: He is in acute distress.      Appearance: He is cachectic. He is ill-appearing.   HENT:      Mouth/Throat:      Mouth: Mucous membranes are dry.      Pharynx: Oropharynx is clear.   Eyes:      Extraocular Movements: Extraocular movements intact.      Pupils: Pupils are equal, round, and reactive to light.   Neck:      Comments: RIJ TLC  Cardiovascular:      Rate and Rhythm: Regular rhythm. Tachycardia present.      Pulses: Normal pulses.      Heart sounds: Normal heart sounds.      Comments: ST  Pulmonary:      Effort: Tachypnea present.      Comments: Bilateral chest tubes  Abdominal:      Palpations: Abdomen is soft.      Comments: Bilateral KENNY drains   Genitourinary:     Comments: Johansen  Musculoskeletal:         General: Normal range of motion.   Skin:     General: Skin is warm and dry.      Capillary Refill: Capillary refill takes less than 2 seconds.   Neurological:      General: No focal deficit present.      Mental Status: He is easily aroused. He is lethargic and disoriented.      GCS:  GCS eye subscore is 3. GCS verbal subscore is 4. GCS motor subscore is 6.   Psychiatric:         Attention and Perception: He is inattentive.         Speech: Speech is delayed.         Behavior: Behavior is cooperative.            Significant Labs:  CBC:   Recent Labs   Lab 11/27/24  0320   WBC 25.79*   RBC 3.11*   HGB 8.3*   HCT 26.4*      MCV 85   MCH 26.7*   MCHC 31.4*     CMP:   Recent Labs   Lab 11/27/24  0320   *   CALCIUM 8.7   ALBUMIN 1.6*   PROT 6.5   *   K 3.6   CO2 30*      BUN 32*   CREATININE 1.7*   ALKPHOS 156*   ALT 37   AST 71*   BILITOT 1.9*       Significant Diagnostics:  I have reviewed all pertinent imaging results/findings within the past 24 hours.    VTE Risk Mitigation (From admission, onward)           Ordered     heparin (porcine) injection 5,000 Units  Every 8 hours         11/26/24 0851                  Assessment/Plan:     * Esophageal perforation  69 yoM s/p [facility]. Transferred for concern of ongoing leak. No access to outside imaging at this time. Overall clinically stable in the MICU. Not on pressors. Minimal respiratory support. Case discussed with Dr. Goldberg and reviewed with family. Patient is quite frail with poor cardiac function and would not tolerate surgical intervention well, therefore minimally invasive procedures would be preferable.    - underwent stent with AES, large defect near GEJ  - unfortunately, there are few surgical options for this patient as he has already failed attempted surgical repair  - can obtain CT scan to assess for un-drained fluid accumulations  - Care per MICU  - Thoracic surgery will follow        James Mendez MD  Thoracic Surgery  [facility] - Medical ICU

## 2024-11-27 NOTE — PROGRESS NOTES
[facility] - Medical ICU  Critical Care Medicine  Progress Note    Patient Name: Jose Hernández Sr.  MRN: [id number]  Admission Date: 11/23/2024  Hospital Length of Stay: 4 days  Code Status: Full Code  Attending Provider: Faisal Mancera*  Primary Care Provider: Tong Cantrell MD   Principal Problem: Esophageal perforation    Subjective:     HPI:  Mr. Hernández is a 69yoM with a PMHx of alcohol use disorder, chronic back pain 2/2 to spinal stenosis, HLD, HTN, and CVA (on plavix) with no residual deficits who presented to OSH on 11/10/24 with epigastric pain and melena; found to have an esophageal perforation. The patient was initally hypotensive, anemic to 5.7, hyperbilirubinemia, lactic acidosis to 9.9. CTAP showed a pneumoperitoneum and pneumomediastinum consistent with a perforated viscus, hiatal hernia with surrounding external gas, and loculated pleural effusions. He was transfused and started on octreotide gtt, protonix, ativan, doxy, zosyn, hannah, vanc, precedex, with AC held. For the loculated pleural effusions, bilateral chest tubes were placed. Diagnostic laparoscopy showed hiatal hernia with gangrene in the lower thoracic esophagus and a perforated esophageal ulcer in the lower thoracic area, and mediastinal fluid pus, food, and pills. He underwent surgical repair on 11/10/24. Additionally, he was found to have HFrEF (EF = 20%).The patient was later extubated but then developed AMS. They repeated imaging with extraluminal air adjacent to the GE junction with increased fluid in the lower posterior mediastium concerning for recurrent esophageal perforation.   On 11/23/24, the patient was transferred to [facility] for AES and CTS consults s/t concern for perforated esophagus and loculated pleural effusions    Per chart review from OSH the patient stopped taking his medications a month before presentation because he thought they were making him weak. Other ROS positive for paleness for 3 weeks prior to  presentation. The patient provided supplemental history and reported that the patient was not eating or drinking except EtOH, drinks 2-3 bottles of wine and 2-4 beers per day.    Hospital/ICU Course:  Mr. Hernández is a 69yoM with PMHx significant for alcohol use disorder, HLD, HTN, and CVA (on plavix) who presented as a transfer for perforated esophageal ulcer. Bilateral chest tubes and bilateral KENNY drains. CT Chest/Abd/Pelvis: Esophageal leak into the paraesophageal posterior mediastinum and likely bilateral pleural spaces. Multifocal ground-glass opacities concerning for acute infectious or inflammatory process.  Small basilar pleural effusions and mild right lower lobe consolidation. ID, Thoracic Surgery, AES consulted. On 11/25, AES successfully placed an esophageal stent. Long-term nutrition will need to be addressed. Overnight with increased WOB and intermittent hypoxia. Chest xray with worsening patchy areas of alveolar consolidation; BNP 1484 - responsive to lasix.    Interval History/Significant Events: Overnight with increased WOB, tachypnea, and intermittent hypoxia. Repeat chest xray with worsening patchy areas of alveolar consolidation. Duoneb given for expiratory wheezing and lasix administered for BNP >1400. Precedex initiated for agitation.    Review of Systems   Unable to perform ROS: Mental status change     Objective:     Vital Signs (Most Recent):  Temp: 98.9 °F (37.2 °C) (11/26/24 2300)  Pulse: (!) 111 (11/27/24 0200)  Resp: (!) 50 (11/27/24 0200)  BP: 119/82 (11/27/24 0000)  SpO2: (!) 90 % (11/27/24 0200) Vital Signs (24h Range):  Temp:  [98.9 °F (37.2 °C)-99.6 °F (37.6 °C)] 98.9 °F (37.2 °C)  Pulse:  [] 111  Resp:  [17-58] 50  SpO2:  [83 %-100 %] 90 %  BP: (110-150)/() 119/82   Weight: 77.6 kg (171 lb 1.2 oz)  Body mass index is 27.61 kg/m².      Intake/Output Summary (Last 24 hours) at 11/27/2024 0519  Last data filed at 11/27/2024 0517  Gross per 24 hour   Intake 2135.73 ml    Output 3585 ml   Net -1449.27 ml          Physical Exam  Vitals and nursing note reviewed.   Constitutional:       General: He is in acute distress.      Appearance: He is cachectic. He is ill-appearing.   HENT:      Mouth/Throat:      Mouth: Mucous membranes are dry.      Pharynx: Oropharynx is clear.   Eyes:      Extraocular Movements: Extraocular movements intact.      Pupils: Pupils are equal, round, and reactive to light.   Neck:      Comments: RIJ TLC  Cardiovascular:      Rate and Rhythm: Regular rhythm. Tachycardia present.      Pulses: Normal pulses.      Heart sounds: Normal heart sounds.      Comments: ST  Pulmonary:      Effort: Tachypnea and respiratory distress present.      Breath sounds: Wheezing and rales present.      Comments: Bilateral chest tubes  Abdominal:      Palpations: Abdomen is soft.      Comments: Bilateral KENNY drains   Genitourinary:     Comments: Johansen  Musculoskeletal:         General: Normal range of motion.   Skin:     General: Skin is warm and dry.      Capillary Refill: Capillary refill takes less than 2 seconds.   Neurological:      General: No focal deficit present.      Mental Status: He is easily aroused. He is lethargic and disoriented.      GCS: GCS eye subscore is 3. GCS verbal subscore is 4. GCS motor subscore is 6.   Psychiatric:         Attention and Perception: He is inattentive.         Speech: Speech is delayed.         Behavior: Behavior is cooperative.          Vents:     Lines/Drains/Airways       Central Venous Catheter Line  Duration             Percutaneous Central Line - Triple Lumen    Internal Jugular Right -- days              Drain  Duration                  Chest Tube   Left Midaxillary -- days         Chest Tube   Right Midaxillary -- days         Closed/Suction Drain   Right Abdomen Bulb -- days         Closed/Suction Drain  Left Abdomen Bulb -- days         Urethral Catheter 11/23/24 1313 Temperature probe 3 days              Peripheral Intravenous  "Line  Duration                  Peripheral IV - Single Lumen 11/23/24 0901 18 G 1 3/4 in No Left Antecubital 3 days                  Significant Labs:    CBC/Anemia Profile:  Recent Labs   Lab 11/26/24  0333 11/27/24  0320   WBC 18.11* 25.79*   HGB 7.8* 8.3*   HCT 24.3* 26.4*    395   MCV 84 85   RDW 22.0* 21.7*        Chemistries:  Recent Labs   Lab 11/26/24  0333 11/27/24  0320   * 151*   K 3.6 3.6   * 106   CO2 24 30*   BUN 21 32*   CREATININE 1.8* 1.7*   CALCIUM 8.1* 8.7   ALBUMIN 1.6* 1.6*   PROT 5.8* 6.5   BILITOT 1.9* 1.9*   ALKPHOS 145 156*   ALT 46* 37   * 71*   MG 2.0 2.1   PHOS 2.8 3.5       All pertinent labs within the past 24 hours have been reviewed.    Significant Imaging:  I have reviewed all pertinent imaging results/findings within the past 24 hours.    ABG  No results for input(s): "PH", "PO2", "PCO2", "HCO3", "BE" in the last 168 hours.  Assessment/Plan:     Neuro  History of CVA (cerebrovascular accident)  CVA in 2012. No residual deficits.     - Holding home plavix/ASA while determining pending surgical/endoscopic interventions   - Neuro checks    Psychiatric  Alcohol use disorder  Per chart review, drinks 2-3 bottles of wine and 3-4 beers per day. Last drink around 11/8/24. Treated with Ativan at OSH.     - Liver ultrasound; without signs of portal HTN  - Monitor for s/s of withdrawal  - Seizure precautions  - CIWA  - Continue folic acid and thiamine    Pulmonary  Acute hypoxic respiratory failure  Patient with Hypoxic Respiratory failure which is Acute.  he is not on home oxygen. Supplemental oxygen was provided and noted-    .   Signs/symptoms of respiratory failure include- tachypnea, increased work of breathing, and respiratory distress. Contributing diagnoses includes - Aspiration, CHF, Pleural effusion, and Pneumonia Labs and images were reviewed. Patient Has recent ABG, which has been reviewed. Will treat underlying causes and adjust management of respiratory " failure as follows-     Overnight with increasing O2 requirements up to 10L HF NC. Increased WOB. Coarse breath sounds upon auscultation. Most recent chest xray with worsening patchy areas of alveolar consolidation. BNP 1484. Likely fluid volume overload vs. Aspiration?    - Maintain spO2 >90%  - Diuresis  - Duonebs for wheezing  - Maintains on BSA; dapto/wilda/hannah  - Follow up sputum cx  - Follow up ABG  - May benefit from repeat CT scan  - Precedex gtt for agitation    Loculated pleural effusion  CT scans concerning for loculated pleural effusions; bilateral large-bore chest tubes inserted prior to transfer. Draining to suction.    - CTS consulted; appreciate recs  - Maintain spO2 >90%    Cardiac/Vascular  Acute systolic heart failure  Reported EF of 15-20% at outside facility.  Hemodynamically stable.  Echo 11/25/24:    Left Ventricle: normal in size. Ventricular mass is normal. Normal wall thickness. There is concentric remodeling. Regional wall motion abnormalities present. Septal motion is abnormal. There is low normal systolic function with a visually estimated ejection fraction of 50 - 55%. There is indeterminate diastolic function.    Right Ventricle: Normal size and function    Left Atrium: Normal left atrial size.    Right Atrium: Normal - upper limits of normal right atrial size.    Aortic Valve: The aortic valve is a trileaflet valve. There is moderate aortic valve sclerosis. Aortic valve peak velocity is 1.4 m/s. Mean gradient is 5.9 mmHg.    Mitral Valve: The mitral valve is structurally normal. There is no significant regurgitation.    Tricuspid Valve: The tricuspid valve is structurally normal.    Pulmonic Valve: The pulmonic valve is structurally normal. There is no significant regurgitation.    Pulmonary Artery: The estimated pulmonary artery systolic pressure is 35 mmHg.    IVC/SVC: Normal venous pressure at 3 mmHg.    - Strict I/Os  - Latest BNP 1484  - Diuresis    Hyperlipidemia  Takes statin  at home; holding in the setting of previous transaminitis.    - Daily CMP  - Resume medications as appropriate    Essential hypertension  History of HTN, had stopped taking home medications about a month prior to admission at OSH.    - Holding home meds; resume as appropriate  - Maintain SBP <180    Renal/  ALFREDO (acute kidney injury)  Admit creatinine 1.5. Baseline Cr: 0.9.    - Strict I/Os  - Daily CMP  - Avoid nephrotoxic agents  - Renally dose medications  - Hypernatremic; likely s/t volume depletion with dehydration    ID  Sepsis without acute organ dysfunction  CT concerning for for multifocal PNA. Covered with Kade/Apple/Vanc.    - ID consulted; appreciate recs  - Continue BSA; Kade/Apple  - Discontinued Vanc; Initiated Dapto  - Daily CBC    GI  * Esophageal perforation  11/10/24: CT abdomen showed evidence of pneumomediastinum, pneumoperitoneum all concerning for perforated viscus.   - Robotic assisted laparoscopy performed. 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 Sicard).   - General surgery performed EGD immediately following laparoscopy. Report no evidence of intra-esophageal leakage. Bilateral KENNY drains placed.    11/23/24: [place]. [place] [facility] [place] higher level of care and AES/CTS consults.    - CTS consulted; appreciate recs. They feel he is too frail to undergo surgical management  - AES consulted; appreciate recs. Esophageal stent placed successfully on 11/25  - Continue protonix BID  - Monitor for bleeding; trend CBCs  - Transfuse for hgb <7  - Monitor KENNY drains       Critical Care Daily Checklist:    A: Awake: RASS Goal/Actual Goal: RASS Goal: 0-->alert and calm  Actual:     B: Spontaneous Breathing Trial Performed?     C: SAT & SBT Coordinated?  N/A                      D: Delirium: CAM-ICU Overall CAM-ICU: Negative   E: Early Mobility Performed? No   F: Feeding Goal: Goals: Meet % EEN, EPN by RD f/u  date  Status: Nutrition Goal Status: new   Current Diet Order   Procedures    Diet NPO      AS: Analgesia/Sedation PRN dilaudid, precedex gtt   T: Thromboembolic Prophylaxis Heparin gtt   H: HOB > 300 Yes   U: Stress Ulcer Prophylaxis (if needed) PPI   G: Glucose Control 140-180   B: Bowel Function Stool Occurrence: 1   I: Indwelling Catheter (Lines & Johansen) Necessity R. IJ TLC, PIVs, Johansen, Chest tube x2, KENNY drain   D: De-escalation of Antimicrobials/Pharmacotherapies N/A - continue wilda/hannah/dapto    Plan for the day/ETD Workup of acute hypoxic respiratory failure.    Code Status:  Family/Goals of Care: Full Code       Critical Care Time: 60 minutes  Critical secondary to perforated esophagus, acute hypoxic respiratory failure, loculated pleural effusions requiring chest tubes.    Full attestation from CCM attending to follow.     Critical care was time spent personally by me on the following activities: development of treatment plan with patient or surrogate and bedside caregivers, discussions with consultants, evaluation of patient's response to treatment, examination of patient, ordering and performing treatments and interventions, ordering and review of laboratory studies, ordering and review of radiographic studies, pulse oximetry, re-evaluation of patient's condition. This critical care time did not overlap with that of any other provider or involve time for any procedures.     MAK Contreras-BC  Critical Care Medicine  [facility] - Medical ICU

## 2024-11-27 NOTE — ASSESSMENT & PLAN NOTE
-S/p EGD yesterday with large distal defect- stented  -Worsening WBC of 27; H/H stable; afebrile- Monitor for bleeding; trend CBCs   -Tentative plan for esophagram Friday  Will need close follow up with thoracic surgery and AES to ensure adequate nonsurgical management of large esophageal perforation moving forward  - If no leak seen on future esophagram, could consider no more than a liquid diet for nutrition.   - If enteral nutrition is desired, the patient does have a known pyloric stenosis so could consider review with surgery for surgical jejunostomy or IR guided G or GJ tube. Otherwise, patient might require temporary TPN until its determined whether there is ongoing esophageal leak or not.   -Repeat upper endoscopy with fluoro in 5-6 weeks at [facility] only for partially covered esophageal stent removal/probable exchange

## 2024-11-27 NOTE — EICU
Intervention Initiated From:  Bedside    Patrick intervened regarding:  Documentation and Respiratory    eLerted for intubation. RSI medications ordered, linked to override and charted for bedside.  1516-10mg Etomidate given IVP followed by 80mg Rocuronium IVP. 7.5ETT inserted by Dr John Cruz, assisted by Dr Mancera without incident. Secured @ 24cm (teeth) LDA added CXR, bilateral soft wrist restraints ordered. Pt tolerated well, VSS.      1801-eLerted for arterial line insertion. Time out done. Left radial arterial line placed by Ambreen Spear DNP without difficulty. Appropriate waveform observed. LDA added. Bedside procedure supply charge entered.   Notified Ramila @1576     [facility]  10/16/21 [id number]

## 2024-11-27 NOTE — SUBJECTIVE & OBJECTIVE
Interval History:  Overnight has had increasing respiratory requirements. minimal output from right-sided chest tube.  Some serosanguineous output from left-sided chest tube.  Questionable seropurulent output from KENNY drain per nursing.  Remains afebrile, WBC is 25 from 18 yesterday.    Medications:  Continuous Infusions:   dexmedeTOMIDine (Precedex) infusion (titrating)  0-1.4 mcg/kg/hr Intravenous Continuous 11.64 mL/hr at 11/27/24 1000 0.6 mcg/kg/hr at 11/27/24 1000    TPN ADULT CENTRAL LINE CUSTOM   Intravenous Continuous 60 mL/hr at 11/27/24 1000 Rate Verify at 11/27/24 1000    TPN ADULT CENTRAL LINE CUSTOM   Intravenous Continuous         Scheduled Meds:   DAPTOmycin (CUBICIN) IV (PEDS and ADULTS)  8 mg/kg Intravenous Q24H    fat emulsion 20%  250 mL Intravenous Daily    furosemide (LASIX) injection  80 mg Intravenous Q8H    heparin (porcine)  5,000 Units Subcutaneous Q8H    meropenem IV (PEDS and ADULTS)  2 g Intravenous Q12H    micafungin  100 mg Intravenous Q24H    pantoprazole  40 mg Intravenous BID     PRN Meds:  Current Facility-Administered Medications:     HYDROmorphone, 0.5 mg, Intravenous, Q6H PRN    iohexol, 15 mL, Oral, PRN    sodium chloride 0.9%, 10 mL, Intravenous, PRN     Review of patient's allergies indicates:  No Known Allergies  Objective:     Vital Signs (Most Recent):  Temp: 99.4 °F (37.4 °C) (11/27/24 0701)  Pulse: 96 (11/27/24 1000)  Resp: (!) 44 (11/27/24 1000)  BP: 108/73 (11/27/24 1000)  SpO2: 96 % (11/27/24 1000) Vital Signs (24h Range):  Temp:  [98.6 °F (37 °C)-99.4 °F (37.4 °C)] 99.4 °F (37.4 °C)  Pulse:  [] 96  Resp:  [17-58] 44  SpO2:  [83 %-100 %] 96 %  BP: ()/(65-99) 108/73     Intake/Output - Last 3 Shifts         11/25 0700  11/26 0659 11/26 0700 11/27 0659 11/27 0700 11/28 0659    I.V. (mL/kg) 1289.5 (16.6) 44.8 (0.6) 42.5 (0.5)    IV Piggyback 544.7 689.6 177.7    .6 1444.2 304.7    Total Intake(mL/kg) 2379.8 (30.7) 2178.6 (28.1) 524.8 (6.8)    Urine  (mL/kg/hr) 1525 (0.8) 4085 (2.2) 574 (1.6)    Drains 465 50     Stool 0 0     Chest Tube 0      Total Output 1990 4135 574    Net +389.8 -1956.4 -49.2           Stool Occurrence 1 x 1 x             SpO2: 96 %        Physical Exam  Vitals and nursing note reviewed.   Constitutional:       General: He is in acute distress.      Appearance: He is cachectic. He is ill-appearing.   HENT:      Mouth/Throat:      Mouth: Mucous membranes are dry.      Pharynx: Oropharynx is clear.   Eyes:      Extraocular Movements: Extraocular movements intact.      Pupils: Pupils are equal, round, and reactive to light.   Neck:      Comments: RIJ TLC  Cardiovascular:      Rate and Rhythm: Regular rhythm. Tachycardia present.      Pulses: Normal pulses.      Heart sounds: Normal heart sounds.      Comments: ST  Pulmonary:      Effort: Tachypnea present.      Comments: Bilateral chest tubes  Abdominal:      Palpations: Abdomen is soft.      Comments: Bilateral KENNY drains   Genitourinary:     Comments: Johansen  Musculoskeletal:         General: Normal range of motion.   Skin:     General: Skin is warm and dry.      Capillary Refill: Capillary refill takes less than 2 seconds.   Neurological:      General: No focal deficit present.      Mental Status: He is easily aroused. He is lethargic and disoriented.      GCS: GCS eye subscore is 3. GCS verbal subscore is 4. GCS motor subscore is 6.   Psychiatric:         Attention and Perception: He is inattentive.         Speech: Speech is delayed.         Behavior: Behavior is cooperative.            Significant Labs:  CBC:   Recent Labs   Lab 11/27/24  0320   WBC 25.79*   RBC 3.11*   HGB 8.3*   HCT 26.4*      MCV 85   MCH 26.7*   MCHC 31.4*     CMP:   Recent Labs   Lab 11/27/24  0320   *   CALCIUM 8.7   ALBUMIN 1.6*   PROT 6.5   *   K 3.6   CO2 30*      BUN 32*   CREATININE 1.7*   ALKPHOS 156*   ALT 37   AST 71*   BILITOT 1.9*       Significant Diagnostics:  I have reviewed all  pertinent imaging results/findings within the past 24 hours.    VTE Risk Mitigation (From admission, onward)           Ordered     heparin (porcine) injection 5,000 Units  Every 8 hours         11/26/24 1614

## 2024-11-27 NOTE — ASSESSMENT & PLAN NOTE
69 yoM s/p primary repair of esophageal perforation at [facility] on 11/10. Transferred for concern of ongoing leak. No access to outside imaging at this time. Overall clinically stable in the MICU. Not on pressors. Minimal respiratory support. Case discussed with Dr. Goldberg and reviewed with family. Patient is quite frail with poor cardiac function and would not tolerate surgical intervention well, therefore minimally invasive procedures would be preferable.    - underwent stent with AES, large defect near GEJ  - unfortunately, there are few surgical options for this patient as he has already failed attempted surgical repair  - can obtain CT scan to assess for un-drained fluid accumulations  - Care per MICU  - Thoracic surgery will follow

## 2024-11-27 NOTE — ASSESSMENT & PLAN NOTE
Patient with Hypoxic Respiratory failure which is Acute.  he is not on home oxygen. Supplemental oxygen was provided and noted-    .   Signs/symptoms of respiratory failure include- tachypnea, increased work of breathing, and respiratory distress. Contributing diagnoses includes - Aspiration, CHF, Pleural effusion, and Pneumonia Labs and images were reviewed. Patient Has recent ABG, which has been reviewed. Will treat underlying causes and adjust management of respiratory failure as follows-     Overnight with increasing O2 requirements up to 10L HF NC. Increased WOB. Coarse breath sounds upon auscultation. Most recent chest xray with worsening patchy areas of alveolar consolidation. BNP 1484. Likely fluid volume overload vs. Aspiration?    - Maintain spO2 >90%  - Diuresis  - Duonebs for wheezing  - Maintains on BSA; dapto/wilda/hannah  - Follow up sputum cx  - Follow up ABG  - May benefit from repeat CT scan  - Precedex gtt for agitation

## 2024-11-27 NOTE — CARE UPDATE
Pt with increasing oxygen requirements. ABG 7.532/42.8/42/35.9/82/13. Pt started on comfort flow 40L/80%.     Family contacted and informed of patient change in clinical status. Dr. Mancera reached out to Thoracic surgery. Unfortunately, patient not a surgical candidate at this time. Agree with CT scan if patient can safely be transported to CT. Spoke with family and informed them that I was worried that Mr. Hernández was dying and that with limited treatment options I would advise against mechanical ventilation and CPR. I told the daughter that I would advise her to let her family know to come to the hospital.     Mr. Hernández continued to have worsening oxygen requirements and is now on vasopressors. Family notified that patient would be intubated. Family in agreement, will proceed with intubation and CT scan.     Pt intubated by Dr. Mancera and Dr. Cruz.   Immediately after intubation patient with increased peak pressures. Pt changed to pressure control ventilation with improvement in peak pressures. Ok to proceed with CT scan.     This MICHELLE traveled to CT scan with bedside RN, RT.     Critical care time: 60minutes  Critical care was time spent personally by me on the following activities: development of treatment plan with patient or surrogate and bedside caregivers, discussions with consultants, evaluation of patient's response to treatment, examination of patient, ordering and performing treatments and interventions, ordering and review of laboratory studies, ordering and review of radiographic studies, pulse oximetry, re-evaluation of patient's condition. This critical care time did not overlap with that of any other provider or involve time for any procedures.     Ambreen Spear DNP, [facility]-  Critical Care Medicine  11/27/2024 4:15 PM

## 2024-11-27 NOTE — PROGRESS NOTES
[facility] - Medical ICU  Gastroenterology  Progress Note    Patient Name: Jose Hernández Sr.  MRN: [id number]  Admission Date: 11/23/2024  Hospital Length of Stay: 4 days  Code Status: Full Code   Attending Provider: Faisal Mancera*  Consulting Provider: Mayur Miles PA-C  Primary Care Physician: Tong Cantrell MD  Principal Problem: Esophageal perforation        Subjective:     HPI: Jose Hernández Sr. is a 70 yo M w/ CHF (EF 20%), chronic NSAID use, alcohol dependence, spinal stenosis, CVA who was admitted to outside hospital on 11/10/2024 with epigastric pain, nausea, vomiting, diarrhea, melena.  He was found to be anemic with a hemoglobin of 5.7, and vitals with blood pressure 70s over 40s, pulse 110 and a lactic acid of 9.9.  CT abdomen showed evidence of pneumomediastinum, pneumoperitoneum with concern for perforated viscus.  Patient was taken to surgery and had a robotic assisted laparotomy performed with bilateral chest tube placement and bilateral intra-abdominal drain placement on 11/10.  They noted gangrene of the lower thoracic esophagus and perforation of an esophageal ulcer which were repaired.  General surgery also did an EGD right after the laparoscopy and found no evidence of intra esophageal leakage.  However patient then went into acute hemorrhagic shock, acute hypoxic respiratory failure and admitted to the ICU.  He was intubated and on pressors and antibiotics but has since been extubated and off pressors and not tolerating NG tube feeds.  Discussion with CT surgery resulted in recommendation of transferring to [facility] for further evaluation.  AES consulted for possible stent placement if necessary.     Interval History: s/p EGD yesterday which demonstrated large esophageal perforation- stent placed. Overnight has had increasing respiratory requirements. minimal output from right-sided chest tube.  Some serosanguineous output from left-sided chest tube.  Questionable seropurulent output  from KENNY drain per nursing.  Remains afebrile, WBC is 25 from 18 yesterday. Patient somnolent on exam and could not obtain history.     Review of Systems  Objective:     Vital Signs (Most Recent):  Temp: 99.6 °F (37.6 °C) (11/27/24 1100)  Pulse: 88 (11/27/24 1522)  Resp: (!) 22 (11/27/24 1522)  BP: 120/84 (11/27/24 1522)  SpO2: 100 % (11/27/24 1522) Vital Signs (24h Range):  Temp:  [98.6 °F (37 °C)-99.6 °F (37.6 °C)] 99.6 °F (37.6 °C)  Pulse:  [] 88  Resp:  [17-54] 22  SpO2:  [85 %-100 %] 100 %  BP: ()/(54-91) 120/84     Weight: 77.6 kg (171 lb 1.2 oz) (11/26/24 1056)  Body mass index is 27.61 kg/m².      Intake/Output Summary (Last 24 hours) at 11/27/2024 1557  Last data filed at 11/27/2024 1200  Gross per 24 hour   Intake 1916.43 ml   Output 3514 ml   Net -1597.57 ml       Lines/Drains/Airways       Central Venous Catheter Line  Duration             Percutaneous Central Line - Triple Lumen    Internal Jugular Right -- days              Drain  Duration                  Chest Tube   Left Midaxillary -- days         Chest Tube   Right Midaxillary -- days         Closed/Suction Drain   Right Abdomen Bulb -- days         Closed/Suction Drain  Left Abdomen Bulb -- days         Urethral Catheter 11/23/24 1313 Temperature probe 4 days              Airway  Duration                  Airway - Non-Surgical 11/27/24 1520 Endotracheal Tube <1 day              Peripheral Intravenous Line  Duration                  Peripheral IV - Single Lumen 11/23/24 0901 18 G 1 3/4 in No Left Antecubital 4 days                     Physical Exam  Constitutional:       General: He is in acute distress.      Appearance: He is cachectic. He is ill-appearing.   Pulmonary:      Comments: Bilateral chest tubes- serosanguinous  Abdominal:      General: Abdomen is flat. Bowel sounds are normal. There is no distension.      Palpations: Abdomen is soft.      Tenderness: There is no abdominal tenderness.      Comments: Bilateral KENNY drains    Neurological:      Mental Status: He is lethargic and disoriented.          Significant Labs:  CBC:   Recent Labs   Lab 11/26/24  0333 11/27/24  0320 11/27/24  1500   WBC 18.11* 25.79* 27.73*   HGB 7.8* 8.3* 7.8*   HCT 24.3* 26.4* 24.7*    395 335     CMP:   Recent Labs   Lab 11/27/24  0320   *   CALCIUM 8.7   ALBUMIN 1.6*   PROT 6.5   *   K 3.6   CO2 30*      BUN 32*   CREATININE 1.7*   ALKPHOS 156*   ALT 37   AST 71*   BILITOT 1.9*     Liver Function Test:   Recent Labs   Lab 11/26/24  0333 11/27/24  0320   ALT 46* 37   * 71*   ALKPHOS 145 156*   BILITOT 1.9* 1.9*   PROT 5.8* 6.5   ALBUMIN 1.6* 1.6*         Significant Imaging:      EGD 11/26/24  Findings:       A large non-bleeding perforation was found 38 to 40 cm from the        incisors just proximal to/at level of GE junction with surgical        suture material seen quite loose at the proximal and distal aspects        of the perforation. This is not going to be amenable to endoscopic        closure. This was stented with a 23 mm wide x 10 cm long Agile        partially covered stent under fluoroscopic guidance - which will        need to be removed/likely exchanged in no longer than 5-6 weeks,        maximum. Estimated blood loss: none.        Hiatal narrowing was seen at about 41cm from incisors.        A tortuosity was seen in the distal esophagus.        A moderate stenosis was found at the pylorus. This was non-traversed.   Impression:            - A large non-bleeding perforation, with loose                          surgical sutures associated with it, was found in                          the distal esophagus at about 38-40cm from                          incisors - not amenable to endoscopic closure.                          Prosthesis placed (23mm wide x 10cm long partially                          covered Agile esophageal stent) - which will need                          to be removed/likely exchanged in no longer  than                          5-6 weeks, maximum.                          - Gastric pyloric stenosis was found at the                          pylorus - unable to traverse with upper scope +                          distal cap, which is about 11mm diameter.                          - No specimens collected.   Recommendation:        - Return patient to ICU for ongoing care.                          - NPO.                          - High dose PPI IV BID.                          - Consider an esophagram to monitor for leaking at                          perforation site in about 5 days or otherwise                          directed by thoracic surgery (seal of esophageal                          mucosa into partially uncovered portion to take at                          least a few days to form).                          - Will need close follow up with thoracic surgery                          and AES to ensure adequate nonsurgical management                          of large esophageal perforation moving forward.                          - If no leak seen on future esophagram, could                          consider no more than a liquid diet for nutrition.                          If enteral nutrition is desired, the patient does                          have a known pyloric stenosis so could consider                          review with surgery for surgical jejunostomy or IR                          guided G or GJ tube. Otherwise, patient might                          require temporary TPN until its determined whether                          there is ongoing esophageal leak or not.                          - Repeat upper endoscopy with fluoro in 5-6 weeks                          at main [place] only for partially covered                          esophageal stent removal/probable exchange for                          management of large esophageal perforation; next                          exam not to exceed 5-6  weeks from placement today                          11/25/24. If the patient does not return for this,                          there is ongoing future risk that the stent may                          not be removable due to ingrowth of mucosa within                          the partially covered portion.                          - Return to referring physician.   Assessment/Plan:     GI  * Esophageal perforation  -S/p EGD yesterday with large distal defect- stented  -Worsening WBC of 27; H/H stable; afebrile- Monitor for bleeding; trend CBCs   -Tentative plan for esophagram Friday  Will need close follow up with thoracic surgery and AES to ensure adequate nonsurgical management of large esophageal perforation moving forward  - If no leak seen on future esophagram, could consider no more than a liquid diet for nutrition.   - If enteral nutrition is desired, the patient does have a known pyloric stenosis so could consider review with surgery for surgical jejunostomy or IR guided G or GJ tube. Otherwise, patient might require temporary TPN until its determined whether there is ongoing esophageal leak or not.   -Repeat upper endoscopy with fluoro in 5-6 weeks at main campus only for partially covered esophageal stent removal/probable exchange         Thank you for your consult. I will follow-up with patient. Please contact us if you have any additional questions.    Mayur Miles PA-C  Gastroenterology  [facility] - Medical ICU

## 2024-11-27 NOTE — SUBJECTIVE & OBJECTIVE
Subjective:     HPI: Jose Hernández Sr. is a 68 yo M w/ CHF (EF 20%), chronic NSAID use, alcohol dependence, spinal stenosis, CVA who was admitted to outside hospital on 11/10/2024 with epigastric pain, nausea, vomiting, diarrhea, melena.  He was found to be anemic with a hemoglobin of 5.7, and vitals with blood pressure 70s over 40s, pulse 110 and a lactic acid of 9.9.  CT abdomen showed evidence of pneumomediastinum, pneumoperitoneum with concern for perforated viscus.  Patient was taken to surgery and had a robotic assisted laparotomy performed with bilateral chest tube placement and bilateral intra-abdominal drain placement on 11/10.  They noted gangrene of the lower thoracic esophagus and perforation of an esophageal ulcer which were repaired.  General surgery also did an EGD right after the laparoscopy and found no evidence of intra esophageal leakage.  However patient then went into acute hemorrhagic shock, acute hypoxic respiratory failure and admitted to the ICU.  He was intubated and on pressors and antibiotics but has since been extubated and off pressors and not tolerating NG tube feeds.  Discussion with CT surgery resulted in recommendation of transferring to [facility] for further evaluation.  AES consulted for possible stent placement if necessary.     Interval History: s/p EGD yesterday which demonstrated large esophageal perforation- stent placed. Overnight has had increasing respiratory requirements. minimal output from right-sided chest tube.  Some serosanguineous output from left-sided chest tube.  Questionable seropurulent output from KENNY drain per nursing.  Remains afebrile, WBC is 25 from 18 yesterday. Patient somnolent on exam and could not obtain history.     Review of Systems  Objective:     Vital Signs (Most Recent):  Temp: 99.6 °F (37.6 °C) (11/27/24 1100)  Pulse: 88 (11/27/24 1522)  Resp: (!) 22 (11/27/24 1522)  BP: 120/84 (11/27/24 1522)  SpO2: 100 % (11/27/24 1522) Vital Signs (24h  Range):  Temp:  [98.6 °F (37 °C)-99.6 °F (37.6 °C)] 99.6 °F (37.6 °C)  Pulse:  [] 88  Resp:  [17-54] 22  SpO2:  [85 %-100 %] 100 %  BP: ()/(54-91) 120/84     Weight: 77.6 kg (171 lb 1.2 oz) (11/26/24 1056)  Body mass index is 27.61 kg/m².      Intake/Output Summary (Last 24 hours) at 11/27/2024 1557  Last data filed at 11/27/2024 1200  Gross per 24 hour   Intake 1916.43 ml   Output 3514 ml   Net -1597.57 ml       Lines/Drains/Airways       Central Venous Catheter Line  Duration             Percutaneous Central Line - Triple Lumen    Internal Jugular Right -- days              Drain  Duration                  Chest Tube   Left Midaxillary -- days         Chest Tube   Right Midaxillary -- days         Closed/Suction Drain   Right Abdomen Bulb -- days         Closed/Suction Drain  Left Abdomen Bulb -- days         Urethral Catheter 11/23/24 1313 Temperature probe 4 days              Airway  Duration                  Airway - Non-Surgical 11/27/24 1520 Endotracheal Tube <1 day              Peripheral Intravenous Line  Duration                  Peripheral IV - Single Lumen 11/23/24 0901 18 G 1 3/4 in No Left Antecubital 4 days                     Physical Exam  Constitutional:       General: He is in acute distress.      Appearance: He is cachectic. He is ill-appearing.   Pulmonary:      Comments: Bilateral chest tubes- serosanguinous  Abdominal:      General: Abdomen is flat. Bowel sounds are normal. There is no distension.      Palpations: Abdomen is soft.      Tenderness: There is no abdominal tenderness.      Comments: Bilateral KENNY drains   Neurological:      Mental Status: He is lethargic and disoriented.          Significant Labs:  CBC:   Recent Labs   Lab 11/26/24  0333 11/27/24  0320 11/27/24  1500   WBC 18.11* 25.79* 27.73*   HGB 7.8* 8.3* 7.8*   HCT 24.3* 26.4* 24.7*    395 335     CMP:   Recent Labs   Lab 11/27/24  0320   *   CALCIUM 8.7   ALBUMIN 1.6*   PROT 6.5   *   K 3.6    CO2 30*      BUN 32*   CREATININE 1.7*   ALKPHOS 156*   ALT 37   AST 71*   BILITOT 1.9*     Liver Function Test:   Recent Labs   Lab 11/26/24  0333 11/27/24  0320   ALT 46* 37   * 71*   ALKPHOS 145 156*   BILITOT 1.9* 1.9*   PROT 5.8* 6.5   ALBUMIN 1.6* 1.6*         Significant Imaging:      EGD 11/26/24  Findings:       A large non-bleeding perforation was found 38 to 40 cm from the        incisors just proximal to/at level of GE junction with surgical        suture material seen quite loose at the proximal and distal aspects        of the perforation. This is not going to be amenable to endoscopic        closure. This was stented with a 23 mm wide x 10 cm long Agile        partially covered stent under fluoroscopic guidance - which will        need to be removed/likely exchanged in no longer than 5-6 weeks,        maximum. Estimated blood loss: none.        Hiatal narrowing was seen at about 41cm from incisors.        A tortuosity was seen in the distal esophagus.        A moderate stenosis was found at the pylorus. This was non-traversed.   Impression:            - A large non-bleeding perforation, with loose                          surgical sutures associated with it, was found in                          the distal esophagus at about 38-40cm from                          incisors - not amenable to endoscopic closure.                          Prosthesis placed (23mm wide x 10cm long partially                          covered Agile esophageal stent) - which will need                          to be removed/likely exchanged in no longer than                          5-6 weeks, maximum.                          - Gastric pyloric stenosis was found at the                          pylorus - unable to traverse with upper scope +                          distal cap, which is about 11mm diameter.                          - No specimens collected.   Recommendation:        - Return patient to ICU for  ongoing care.                          - NPO.                          - High dose PPI IV BID.                          - Consider an esophagram to monitor for leaking at                          perforation site in about 5 days or otherwise                          directed by thoracic surgery (seal of esophageal                          mucosa into partially uncovered portion to take at                          least a few days to form).                          - Will need close follow up with thoracic surgery                          and AES to ensure adequate nonsurgical management                          of large esophageal perforation moving forward.                          - If no leak seen on future esophagram, could                          consider no more than a liquid diet for nutrition.                          If enteral nutrition is desired, the patient does                          have a known pyloric stenosis so could consider                          review with surgery for surgical jejunostomy or IR                          guided G or GJ tube. Otherwise, patient might                          require temporary TPN until its determined whether                          there is ongoing esophageal leak or not.                          - Repeat upper endoscopy with fluoro in 5-6 weeks                          at main Tow only for partially covered                          esophageal stent removal/probable exchange for                          management of large esophageal perforation; next                          exam not to exceed 5-6 weeks from placement today                          11/25/24. If the patient does not return for this,                          there is ongoing future risk that the stent may                          not be removable due to ingrowth of mucosa within                          the partially covered portion.                          - Return to referring  physician.

## 2024-11-27 NOTE — PROCEDURES
"Jose Hernández Sr. is a 69 y.o. male patient.    Temp: 99.6 °F (37.6 °C) (11/27/24 1100)  Pulse: 93 (11/27/24 1600)  Resp: (!) 22 (11/27/24 1600)  BP: 113/78 (11/27/24 1600)  SpO2: 100 % (11/27/24 1600)  Weight: 77.6 kg (171 lb 1.2 oz) (11/26/24 1056)  Height: 5' 6" (167.6 cm) (11/26/24 1056)       Intubation    Date/Time: 11/27/2024 5:12 PM  Location procedure was performed: [facility] CRITICAL CARE MEDICINE    Performed by: John Cruz MD  Authorized by: John Cruz MD  Consent Done: Emergent Situation  Indications: respiratory failure and respiratory distress  Intubation method: video-assisted  Preoxygenation: nasal cannula (HFNC)  Sedatives: etomidate  Paralytic: rocuronium  Laryngoscope size: Mac 3  Tube size: 7.5 mm  Tube type: cuffed  Number of attempts: 1  Cricoid pressure: yes  Cords visualized: yes  Post-procedure assessment: chest rise and ETCO2 monitor  Breath sounds: clear  Cuff inflated: yes  ETT to teeth: 24 cm  Tube secured with: ETT barfield  Chest x-ray interpreted by me and radiologist.  Chest x-ray findings: endotracheal tube in appropriate position  Patient tolerance: Patient tolerated the procedure well with no immediate complications          11/27/2024    "

## 2024-11-27 NOTE — ASSESSMENT & PLAN NOTE
Reported EF of 15-20% at outside facility.  Hemodynamically stable.  Echo 11/25/24:    Left Ventricle: normal in size. Ventricular mass is normal. Normal wall thickness. There is concentric remodeling. Regional wall motion abnormalities present. Septal motion is abnormal. There is low normal systolic function with a visually estimated ejection fraction of 50 - 55%. There is indeterminate diastolic function.    Right Ventricle: Normal size and function    Left Atrium: Normal left atrial size.    Right Atrium: Normal - upper limits of normal right atrial size.    Aortic Valve: The aortic valve is a trileaflet valve. There is moderate aortic valve sclerosis. Aortic valve peak velocity is 1.4 m/s. Mean gradient is 5.9 mmHg.    Mitral Valve: The mitral valve is structurally normal. There is no significant regurgitation.    Tricuspid Valve: The tricuspid valve is structurally normal.    Pulmonic Valve: The pulmonic valve is structurally normal. There is no significant regurgitation.    Pulmonary Artery: The estimated pulmonary artery systolic pressure is 35 mmHg.    IVC/SVC: Normal venous pressure at 3 mmHg.    - Strict I/Os  - Latest BNP 1484  - Diuresis

## 2024-11-27 NOTE — CARE UPDATE
Pt intubated with 7.5 ETT secured 23 cm @ teeth - positive color change, bilateral BS and ETT condensation. Pt placed on ventilator with documented settings. CXR ordered.

## 2024-11-28 NOTE — SUBJECTIVE & OBJECTIVE
Interval History: worsening respiratory status, intubated yesterday. Noted to have purulence in vinnie drain. CT scan obtained yesterday to evaluate for new fluid collections.     Review of Systems   Reason unable to perform ROS: sedated.     Objective:     Vital Signs (Most Recent):  Temp: 98.9 °F (37.2 °C) (11/28/24 0400)  Pulse: 92 (11/28/24 0800)  Resp: (!) 26 (11/28/24 0800)  BP: 99/68 (11/28/24 0400)  SpO2: 98 % (11/28/24 0800) Vital Signs (24h Range):  Temp:  [98 °F (36.7 °C)-99.6 °F (37.6 °C)] 98.9 °F (37.2 °C)  Pulse:  [81-96] 92  Resp:  [0-54] 26  SpO2:  [95 %-100 %] 98 %  BP: ()/(53-92) 99/68  Arterial Line BP: ()/(55-73) 105/62     Weight: 77.6 kg (171 lb 1.2 oz)  Body mass index is 27.61 kg/m².    Estimated Creatinine Clearance: 38 mL/min (A) (based on SCr of 1.8 mg/dL (H)).     Physical Exam  Vitals reviewed.   Constitutional:       Appearance: He is ill-appearing.   HENT:      Head: Normocephalic.      Mouth/Throat:      Comments: ETT  Pulmonary:      Comments: Mechanically ventilated. Bilateral chest tubes  Abdominal:      General: There is no distension.      Palpations: Abdomen is soft.      Comments: Bilateral VINNIE drains with serosanguinous output   Genitourinary:     Comments: douglass  Skin:     General: Skin is warm.      Findings: No rash.          Significant Labs:   Microbiology Results (last 7 days)       Procedure Component Value Units Date/Time    Blood culture [[id number]] Collected: 11/24/24 1635    Order Status: Completed Specimen: Blood from Peripheral, Upper Arm, Left Updated: 11/27/24 1812     Blood Culture, Routine No Growth to date      No Growth to date      No Growth to date      No Growth to date    Blood culture [[id number]] Collected: 11/24/24 0840    Order Status: Completed Specimen: Blood Updated: 11/27/24 1012     Blood Culture, Routine No Growth to date      No Growth to date      No Growth to date      No Growth to date    Culture, Respiratory with Gram Stain  [[id number]]     Order Status: No result Specimen: Respiratory     Clostridium difficile EIA [[id number]]     Order Status: Canceled Specimen: Stool             Significant Imaging: I have reviewed all pertinent imaging results/findings within the past 24 hours.

## 2024-11-28 NOTE — PROGRESS NOTES
[facility] - Medical ICU  Thoracic Surgery  Progress Note    Subjective:     History of Present Illness:  Mr. Hernández is a 69 yoM with PMH significant for chronic alcohol abuse and NSAID use who suffered a perforated esophageal ulcer and underwent promary repair with bilateral chest tube placement and bilateral intraabdominal drain placement on 11/10 at an [facility]. He was transferred here for concern of persistent leak and potential AES, IR, and CTS intervention. Of note, his EF was found to be 20% on [facility] echo. He was transferred to the MICU.     Post-Op Info:  Procedure(s) (LRB):  EGD (ESOPHAGOGASTRODUODENOSCOPY) (N/A)   3 Days Post-Op     Interval History:  Intubated overnight.  Appears to be developing ARDS.  WBC increased to 32 today.  CT yesterday demonstrates no increasing fluid accumulations and chest tube/KENNY drains in appropriate position.  He now has an esophageal stent in place.  He is now requiring 0.15 of levo.    Medications:  Continuous Infusions:   NORepinephrine bitartrate-NaCl  0-0.2 mcg/kg/min Intravenous Continuous 29.1 mL/hr at 11/28/24 1123 0.1 mcg/kg/min at 11/28/24 1123    propofoL  0-50 mcg/kg/min Intravenous Continuous 16.3 mL/hr at 11/28/24 1122 35 mcg/kg/min at 11/28/24 1122    TPN ADULT CENTRAL LINE CUSTOM   Intravenous Continuous 45 mL/hr at 11/28/24 1101 Rate Verify at 11/28/24 1101    TPN ADULT CENTRAL LINE CUSTOM   Intravenous Continuous         Scheduled Meds:   DAPTOmycin (CUBICIN) IV (PEDS and ADULTS)  8 mg/kg Intravenous Q24H    fat emulsion 20%  250 mL Intravenous Daily    furosemide (LASIX) injection  80 mg Intravenous Q12H    heparin (porcine)  5,000 Units Subcutaneous Q8H    meropenem IV (PEDS and ADULTS)  2 g Intravenous Q12H    micafungin  100 mg Intravenous Q24H    pantoprazole  40 mg Intravenous BID     PRN Meds:  Current Facility-Administered Medications:     fentaNYL, 50 mcg, Intravenous, Q1H PRN    HYDROmorphone, 0.5 mg, Intravenous, Q6H PRN    iohexol, 15 mL, Oral, PRN     sodium chloride 0.9%, 10 mL, Intravenous, PRN     Review of patient's allergies indicates:  No Known Allergies  Objective:     Vital Signs (Most Recent):  Temp: 98.9 °F (37.2 °C) (11/28/24 0400)  Pulse: 95 (11/28/24 1111)  Resp: (!) 31 (11/28/24 1325)  BP: 99/68 (11/28/24 0400)  SpO2: 99 % (11/28/24 1111) Vital Signs (24h Range):  Temp:  [98 °F (36.7 °C)-98.9 °F (37.2 °C)] 98.9 °F (37.2 °C)  Pulse:  [81-95] 95  Resp:  [0-34] 31  SpO2:  [95 %-100 %] 99 %  BP: ()/(53-92) 99/68  Arterial Line BP: ()/(55-73) 105/62     Intake/Output - Last 3 Shifts         11/26 0700  11/27 0659 11/27 0700  11/28 0659 11/28 0700  11/29 0659    P.O.   0    I.V. (mL/kg) 44.8 (0.6) 1194.2 (15.4) 300.5 (3.9)    IV Piggyback 689.6 352.8 281.7    TPN 1444.2 1532.8 304.8    Total Intake(mL/kg) 2178.6 (28.1) 3079.9 (39.7) 887 (11.4)    Urine (mL/kg/hr) 4085 (2.2) 4253 (2.3) 510 (1)    Drains 50 20     Stool 0      Chest Tube  0 32    Total Output 4135 4273 542    Net -1956.4 -1193.1 +345           Stool Occurrence 1 x              SpO2: 99 %        Physical Exam  Vitals reviewed.   Constitutional:       Appearance: He is ill-appearing.   HENT:      Head: Normocephalic.      Mouth/Throat:      Comments: ETT  Pulmonary:      Comments: Mechanically ventilated. Bilateral chest tubes, left-sided tube with 200 cc serosanguineous output.  Right-sided tube with minimal output  Abdominal:      General: There is no distension.      Palpations: Abdomen is soft.      Comments: Bilateral KENNY drains with serosanguinous output   Genitourinary:     Comments: douglass  Skin:     General: Skin is warm.      Findings: No rash.            Significant Labs:  CBC:   Recent Labs   Lab 11/28/24 0416   WBC 32.99*   RBC 3.11*   HGB 8.3*   HCT 25.6*      MCV 82   MCH 26.7*   MCHC 32.4     CMP:   Recent Labs   Lab 11/28/24 0416   *   CALCIUM 9.1   ALBUMIN 1.5*   PROT 6.8   *   K 3.1*   CO2 30*      BUN 46*   CREATININE 1.8*   ALKPHOS  153*   ALT 29   AST 62*   BILITOT 2.8*       Significant Diagnostics:  I have reviewed all pertinent imaging results/findings within the past 24 hours.    VTE Risk Mitigation (From admission, onward)           Ordered     heparin (porcine) injection 5,000 Units  Every 8 hours         11/26/24 0851                  Assessment/Plan:     * Esophageal perforation  69 yoM s/p primary [facility] perforation at [facility]10. Transferred for concern of ongoing leak. No access to outside imaging at this time. Overall clinically stable in the MICU. Not on pressors. Minimal respiratory support. Case discussed with Dr. Goldberg and reviewed with family. Patient is quite frail with poor cardiac function and would not tolerate surgical intervention well, therefore minimally invasive procedures would be preferable.    - underwent stent with AES, large defect near GEJ  - unfortunately, there are few surgical options for this patient as he has already failed attempted surgical repair  - CT shows no enlarging fluid accumulations with drains in appropriate position.  - Care per MICU  - Thoracic surgery will follow        James Mendez MD  Thoracic Surgery  [facility] - Medical ICU

## 2024-11-28 NOTE — PROCEDURES
"Jose Hernández Sr. is a 69 y.o. male patient.    Temp: 99.6 °F (37.6 °C) (11/27/24 1100)  Pulse: 93 (11/27/24 1645)  Resp: (!) 0 (11/27/24 1645)  BP: 99/75 (11/27/24 1645)  SpO2: 100 % (11/27/24 1645)  Weight: 77.6 kg (171 lb 1.2 oz) (11/26/24 1056)  Height: 5' 6" (167.6 cm) (11/26/24 1056)       Arterial Line    Date/Time: 11/27/2024 6:21 PM  Location procedure was performed: [facility] CRITICAL CARE MEDICINE    Performed by: Ambreen Spear DNP  Authorized by: Ambreen Spear DNP  Pre-op Diagnosis: Shock  Post-operative diagnosis: Shock  Consent Done: Emergent Situation  Preparation: Patient was prepped and draped in the usual sterile fashion.  Indications: multiple ABGs and hemodynamic monitoring  Location: left radial  Kota's test normal: yes  Needle gauge: 20  Seldinger technique: Seldinger technique used  Number of attempts: 2  Complications: No  Post-procedure: line sutured and dressing applied  Post-procedure CMS: normal  Patient tolerance: Patient tolerated the procedure well with no immediate complications        Ambreen Spear DNP, [facility]  Critical Care Medicine  11/27/2024 6:21 PM        "

## 2024-11-28 NOTE — PROGRESS NOTES
[facility] - Medical ICU  Infectious Disease  Progress Note    Patient Name: Jose Hernández Sr.  MRN: [id number]  Admission Date: 11/23/2024  Length of Stay: 5 days  Attending Physician: Faisal Mancera*  Primary Care Provider: Tong Cantrell MD    Isolation Status: No active isolations  Assessment/Plan:      GI  * Esophageal perforation  Jose Hernández is a 69 year old man with alcohol use disorder, CVA, cdiff (2021) and hypertension transferred from [facility] for esophageal perforation. Found to have pneumoperitoneum/pneumomediastinum on imaging concerning for perforated viscus and loculated pleural effusion s/p robotic lap on 11/10. Had gangrenous thoracic esophagus/hiatal hernia, esophageal ulcer perforation that was repaired with omental patch (op note significant large intraabdominal/mediastinal pus with food stuffs/pills removed from mediastinum, s/p EGD with reported no evidence of intra-esophageal leak, no OR culture sent. Prior hospital course notable for shock, respiratry failure s/p intubation now extubated. Repeat CT c/w esophageal leak. Labs notable for persistent leukocytosis. [place] [facility] [place]. Had esophageal stent placement 11/25 with plan for esophagram on 11/29. Respiratory status worsened requiring intubation on 11/27. Repeat CT chest 11/27 demonstrated that right chest tube is no longer in the R loculated fluid collection and persistence of perigastric fluid collection.     Recommendations:  - continue daptomycin 8mg/kg q24 hours, weekly CK ordered   - continue meropenem 2g q12 (renal dosing), and micafungin 100 mg q24 hours   - would obtain cultures from empyema and perigastric fluid collections via new drain placement if in line with goals of care         Above discussed with primary team.     Critical care time: 40 minutes  I personally spent critical care time on the following: evaluating this patient's organ dysfunction, development of  treatment plan, discussing treatment plan with patient or surrogate and bedside caregivers, discussions with critical care service and/or consultants, evaluation of patient's response to treatment, physical examination of patient, ordering and review of treatments interventions, laboratory studies, and radiographic studies, re-evaluation of patient's condition. This critical care time did not overlap with that of any other provider of the same specialty or involve time for procedures. This patient has increasing ventilator requirements, increasing pressor requirements. They continue to be critically ill.       Anticipated Disposition: TBD    Thank you for your consult. I will follow-up with patient. Please contact us if you have any additional questions.    Candy Baxter MD  Infectious Disease  [facility] - Medical ICU    Subjective:     Principal Problem:Esophageal perforation    HPI: 70 yo male with alcohol use, CVA, prior cdiff 2021 and HTN admitted for HLOC for esophageal perforation. At OSH, pt was found to have pneumoperitoneum/pneumomediastinum on imaging c/f perforated viscus and loculated pleural effusion s/p robotic lap on 11/10, found to have gangrenous thoracic esophagus/hiatal hernia, esophageal ulcer perforation that was repaired with omental patch (op note significant large intraabdominal/mediastinal pus with food stuff/pills removed from mediastinum, s/p EGD with reported no evidence of intra-esophageal leak. Prior hospital course notable for shock, respiratry failure s/p intubation now extubated. Pt is currently on vancomycin, meropenem, and micafungin. CTS and GI consulted.          Interval History: worsening respiratory status, intubated yesterday. Noted to have purulence in vinnie drain. CT scan obtained yesterday to evaluate for new fluid collections.     Review of Systems   Reason unable to perform ROS: sedated.     Objective:     Vital Signs (Most Recent):  Temp: 98.9 °F (37.2 °C) (11/28/24  0400)  Pulse: 92 (11/28/24 0800)  Resp: (!) 26 (11/28/24 0800)  BP: 99/68 (11/28/24 0400)  SpO2: 98 % (11/28/24 0800) Vital Signs (24h Range):  Temp:  [98 °F (36.7 °C)-99.6 °F (37.6 °C)] 98.9 °F (37.2 °C)  Pulse:  [81-96] 92  Resp:  [0-54] 26  SpO2:  [95 %-100 %] 98 %  BP: ()/(53-92) 99/68  Arterial Line BP: ()/(55-73) 105/62     Weight: 77.6 kg (171 lb 1.2 oz)  Body mass index is 27.61 kg/m².    Estimated Creatinine Clearance: 38 mL/min (A) (based on SCr of 1.8 mg/dL (H)).     Physical Exam  Vitals reviewed.   Constitutional:       Appearance: He is ill-appearing.   HENT:      Head: Normocephalic.      Mouth/Throat:      Comments: ETT  Pulmonary:      Comments: Mechanically ventilated. Bilateral chest tubes  Abdominal:      General: There is no distension.      Palpations: Abdomen is soft.      Comments: Bilateral KENNY drains with serosanguinous output   Genitourinary:     Comments: douglass  Skin:     General: Skin is warm.      Findings: No rash.          Significant Labs:   Microbiology Results (last 7 days)       Procedure Component Value Units Date/Time    Blood culture [[id number]] Collected: 11/24/24 1635    Order Status: Completed Specimen: Blood from Peripheral, Upper Arm, Left Updated: 11/27/24 1812     Blood Culture, Routine No Growth to date      No Growth to date      No Growth to date      No Growth to date    Blood culture [[id number]] Collected: 11/24/24 0840    Order Status: Completed Specimen: Blood Updated: 11/27/24 1012     Blood Culture, Routine No Growth to date      No Growth to date      No Growth to date      No Growth to date    Culture, Respiratory with Gram Stain [[id number]]     Order Status: No result Specimen: Respiratory     Clostridium difficile EIA [[id number]]     Order Status: Canceled Specimen: Stool             Significant Imaging: I have reviewed all pertinent imaging results/findings within the past 24 hours.

## 2024-11-28 NOTE — ASSESSMENT & PLAN NOTE
CT scans concerning for loculated pleural effusions; bilateral large-bore chest tubes inserted prior to transfer. Draining to suction.    - CTS consulted; appreciate recs  - Maintain spO2 >90%  - Continue bilateral chest tubes to suction

## 2024-11-28 NOTE — PROGRESS NOTES
[facility] - Medical ICU  Critical Care Medicine  Progress Note    Patient Name: Jose Hernández Sr.  MRN: [id number]  Admission Date: 11/23/2024  Hospital Length of Stay: 5 days  Code Status: Full Code  Attending Provider: Faisal Mancera*  Primary Care Provider: Tong Cantrell MD   Principal Problem: Esophageal perforation    Subjective:     HPI:  Mr. Hernández is a 69yoM with a PMHx of alcohol use disorder, chronic back pain 2/2 to spinal stenosis, HLD, HTN, and CVA (on plavix) with no residual deficits who presented to OSH on 11/10/24 with epigastric pain and melena; found to have an esophageal perforation. The patient was initally hypotensive, anemic to 5.7, hyperbilirubinemia, lactic acidosis to 9.9. CTAP showed a pneumoperitoneum and pneumomediastinum consistent with a perforated viscus, hiatal hernia with surrounding external gas, and loculated pleural effusions. He was transfused and started on octreotide gtt, protonix, ativan, doxy, zosyn, hannah, vanc, precedex, with AC held. For the loculated pleural effusions, bilateral chest tubes were placed. Diagnostic laparoscopy showed hiatal hernia with gangrene in the lower thoracic esophagus and a perforated esophageal ulcer in the lower thoracic area, and mediastinal fluid pus, food, and pills. He underwent surgical repair on 11/10/24. Additionally, he was found to have HFrEF (EF = 20%).The patient was later extubated but then developed AMS. They repeated imaging with extraluminal air adjacent to the GE junction with increased fluid in the lower posterior mediastium concerning for recurrent esophageal perforation.   On 11/23/24, the patient was transferred to [facility] for AES and CTS consults s/t concern for perforated esophagus and loculated pleural effusions    Per chart review from OSH the patient stopped taking his medications a month before presentation because he thought they were making him weak. Other ROS positive for paleness for 3 weeks prior to  presentation. The patient provided supplemental history and reported that the patient was not eating or drinking except EtOH, drinks 2-3 bottles of wine and 2-4 beers per day.    Hospital/ICU Course:  Mr. Hernández is a 69yoM with PMHx significant for alcohol use disorder, HLD, HTN, and CVA (on plavix) who presented as a transfer for perforated esophageal ulcer. Bilateral chest tubes and bilateral KENNY drains. CT Chest/Abd/Pelvis: Esophageal leak into the paraesophageal posterior mediastinum and likely bilateral pleural spaces. Multifocal ground-glass opacities concerning for acute infectious or inflammatory process.  Small basilar pleural effusions and mild right lower lobe consolidation. ID, Thoracic Surgery, AES consulted. On 11/25, AES successfully placed an esophageal stent. Long-term nutrition will need to be addressed. Overnight with increased WOB and intermittent hypoxia. Chest xray with worsening patchy areas of alveolar consolidation; BNP 1484 - responsive to lasix.Intubated 11/27. CT concerning for ARDS. On vasopressors.     Interval History/Significant Events: Intubated 11/27 for hypoxic respiratory failure. CT concerning for ARDS. Pt requiring low dose Levophed.     Review of Systems   Unable to perform ROS: Intubated     Objective:     Vital Signs (Most Recent):  Temp: 98.9 °F (37.2 °C) (11/28/24 0400)  Pulse: 92 (11/28/24 0800)  Resp: (!) 26 (11/28/24 0800)  BP: 99/68 (11/28/24 0400)  SpO2: 98 % (11/28/24 0800) Vital Signs (24h Range):  Temp:  [98 °F (36.7 °C)-99.6 °F (37.6 °C)] 98.9 °F (37.2 °C)  Pulse:  [81-96] 92  Resp:  [0-54] 26  SpO2:  [95 %-100 %] 98 %  BP: ()/(53-92) 99/68  Arterial Line BP: ()/(55-73) 105/62   Weight: 77.6 kg (171 lb 1.2 oz)  Body mass index is 27.61 kg/m².      Intake/Output Summary (Last 24 hours) at 11/28/2024 0840  Last data filed at 11/28/2024 0701  Gross per 24 hour   Intake 2892.14 ml   Output 4033 ml   Net -1140.86 ml          Physical Exam  Constitutional:        Appearance: He is ill-appearing.   Eyes:      Pupils: Pupils are equal, round, and reactive to light.   Cardiovascular:      Rate and Rhythm: Normal rate and regular rhythm.      Pulses: Normal pulses.      Heart sounds: Normal heart sounds.   Pulmonary:      Breath sounds: Wheezing present.      Comments: Intubated on mechanical ventilation  Bilateral Chest tubes to suction.  Abdominal:      General: Bowel sounds are normal.      Palpations: Abdomen is soft.      Comments: KENNY drain x 2   Genitourinary:     Comments: Johansen catheter with clear yellow urine  Musculoskeletal:         General: Normal range of motion.   Skin:     General: Skin is warm and dry.      Coloration: Skin is pale.   Neurological:      Comments: Sedated            Vents:  Vent Mode: A/C (11/28/24 0800)  Ventilator Initiated: Yes (11/27/24 1522)  Set Rate: 18 BPM (11/28/24 0800)  Vt Set: 385 mL (11/27/24 1522)  PEEP/CPAP: 8 cmH20 (11/28/24 0800)  Oxygen Concentration (%): 40 (11/28/24 0800)  Peak Airway Pressure: 34 cmH20 (11/28/24 0800)  Plateau Pressure: 34 cmH20 (11/28/24 0800)  Total Ve: 12.3 L/m (11/28/24 0800)  F/VT Ratio<105 (RSBI): (!) 71.43 (11/28/24 0800)  Lines/Drains/Airways       Central Venous Catheter Line  Duration             Percutaneous Central Line - Triple Lumen    Internal Jugular Right -- days              Drain  Duration                  Chest Tube   Left Midaxillary -- days         Chest Tube   Right Midaxillary -- days         Closed/Suction Drain   Right Abdomen Bulb -- days         Closed/Suction Drain  Left Abdomen Bulb -- days         Urethral Catheter 11/23/24 1313 Temperature probe 4 days              Airway  Duration                  Airway - Non-Surgical 11/27/24 1520 Endotracheal Tube <1 day              Arterial Line  Duration             Arterial Line 11/27/24 1810 Left Radial <1 day              Peripheral Intravenous Line  Duration                  Peripheral IV - Single Lumen 11/23/24 0901 18 G 1 3/4 in  No Left Antecubital 4 days                  Significant Labs:    CBC/Anemia Profile:  Recent Labs   Lab 11/27/24  0320 11/27/24  1500 11/28/24  0416   WBC 25.79* 27.73* 32.99*   HGB 8.3* 7.8* 8.3*   HCT 26.4* 24.7* 25.6*    335 337   MCV 85 84 82   RDW 21.7* 21.5* 21.9*        Chemistries:  Recent Labs   Lab 11/27/24  0320 11/27/24  1500 11/28/24  0416   * 147* 148*   K 3.6 3.5 3.1*    105 105   CO2 30* 34* 30*   BUN 32* 39* 46*   CREATININE 1.7* 1.7* 1.8*   CALCIUM 8.7 8.6* 9.1   ALBUMIN 1.6* 1.5* 1.5*   PROT 6.5 6.6 6.8   BILITOT 1.9* 1.8* 2.8*   ALKPHOS 156* 148 153*   ALT 37 29 29   AST 71* 60* 62*   MG 2.1 2.0 2.1   PHOS 3.5 4.0 4.3       All pertinent labs within the past 24 hours have been reviewed.    Significant Imaging:  I have reviewed all pertinent imaging results/findings within the past 24 hours.    ABG  Recent Labs   Lab 11/28/24  0801   PH 7.471*   PO2 67*   PCO2 49.8*   HCO3 36.3*   BE 13*     Assessment/Plan:     Neuro  History of CVA (cerebrovascular accident)  CVA in 2012. No residual deficits.     - Holding home plavix/ASA while determining pending surgical/endoscopic interventions   - Neuro checks    Psychiatric  Alcohol use disorder  Per chart review, drinks 2-3 bottles of wine and 3-4 beers per day. Last drink around 11/8/24. Treated with Ativan at OSH.     - Liver ultrasound; without signs of portal HTN  - Monitor for s/s of withdrawal  - Seizure precautions  - CIWA  - Continue folic acid and thiamine    Pulmonary  Acute hypoxemic respiratory failure  Patient with Hypoxic Respiratory failure which is Acute.  he is not on home oxygen. Supplemental oxygen was provided and noted- Vent Mode: A/C  Oxygen Concentration (%):  [] 40  Resp Rate Total:  [22 br/min-40 br/min] 34 br/min  Vt Set:  [385 mL] 385 mL  PEEP/CPAP:  [8 cmH20] 8 cmH20  Mean Airway Pressure:  [9 seA78-14 cmH20] 18 cmH20  .   Signs/symptoms of respiratory failure include- tachypnea, increased work of  breathing, and respiratory distress. Contributing diagnoses includes - Aspiration, CHF, Pleural effusion, and Pneumonia Labs and images were reviewed. Patient Has recent ABG, which has been reviewed. Will treat underlying causes and adjust management of respiratory failure as follows-     Overnight with increasing O2 requirements up to 10L HF NC. Increased WOB. Coarse breath sounds upon auscultation. Most recent chest xray with worsening patchy areas of alveolar consolidation. BNP 1484. Likely fluid volume overload vs. Aspiration. CT scan concerning for ARDS    - Maintain spO2 >90%  - Diuresis  - Duonebs for wheezing  - Maintains on BSA; dapto/wilda/hannah  - Sputum culture pending  - Lung protective ventilation  - Wean oxygen for SpO2 >90%  - Daily SAT/SBT  - ABG/VBG prn    Loculated pleural effusion  CT scans concerning for loculated pleural effusions; bilateral large-bore chest tubes inserted prior to transfer. Draining to suction.    - CTS consulted; appreciate recs  - Maintain spO2 >90%  - Continue bilateral chest tubes to suction    Cardiac/Vascular  Acute systolic heart failure  Reported EF of 15-20% at outside facility.  Hemodynamically stable.  Echo 11/25/24:    Left Ventricle: normal in size. Ventricular mass is normal. Normal wall thickness. There is concentric remodeling. Regional wall motion abnormalities present. Septal motion is abnormal. There is low normal systolic function with a visually estimated ejection fraction of 50 - 55%. There is indeterminate diastolic function.    Right Ventricle: Normal size and function    Left Atrium: Normal left atrial size.    Right Atrium: Normal - upper limits of normal right atrial size.    Aortic Valve: The aortic valve is a trileaflet valve. There is moderate aortic valve sclerosis. Aortic valve peak velocity is 1.4 m/s. Mean gradient is 5.9 mmHg.    Mitral Valve: The mitral valve is structurally normal. There is no significant regurgitation.    Tricuspid Valve:  The tricuspid valve is structurally normal.    Pulmonic Valve: The pulmonic valve is structurally normal. There is no significant regurgitation.    Pulmonary Artery: The estimated pulmonary artery systolic pressure is 35 mmHg.    IVC/SVC: Normal venous pressure at 3 mmHg.    - Strict I/Os  - Latest BNP 1484  - Continue Lasix 80 mg q12    Hyperlipidemia  Takes statin at home; holding in the setting of previous transaminitis.    - Daily CMP  - Resume medications as appropriate    Essential hypertension  History of HTN, had stopped taking home medications about a month prior to admission at OSH.    - Holding home meds; resume as appropriate  - Maintain SBP <180    Renal/  ALFREDO (acute kidney injury)  Admit creatinine 1.5. Baseline Cr: 0.9.    - Strict I/Os  - Daily CMP  - Avoid nephrotoxic agents  - Renally dose medications  - Hypernatremic; likely s/t volume depletion with dehydration    ID  Sepsis without acute organ dysfunction  CT concerning for for multifocal PNA. Covered with Kade/Apple/Vanc.    - ID consulted; appreciate recs  - Continue BSA; Kade/Apple  - Discontinued Vanc; Initiated Dapto  - Daily CBC    GI  * Esophageal perforation  11/10/24: CT abdomen showed evidence of pneumomediastinum, pneumoperitoneum all concerning for perforated viscus.   - Robotic assisted laparoscopy performed. 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 Sicard).   - General surgery performed EGD immediately following laparoscopy. Report no evidence of intra-esophageal leakage. Bilateral KENNY drains placed.    11/23/24: [place] [facility] [place] [facility] [place] level of [place] and AES/CTS consults.    - CTS consulted; appreciate recs. They feel he is too frail to undergo surgical management  - AES consulted; appreciate recs. Esophageal stent placed successfully on 11/25  - Continue protonix BID  - Monitor for bleeding; trend CBCs  - Transfuse for hgb <7  -  Monitor KENNY drains        Critical Care Time: 50 minutes  Critical secondary to Patient has a condition that poses threat to life and bodily function: Acute hypoxic respiratory failure      Critical care was time spent personally by me on the following activities: development of treatment plan with patient or surrogate and bedside caregivers, discussions with consultants, evaluation of patient's response to treatment, examination of patient, ordering and performing treatments and interventions, ordering and review of laboratory studies, ordering and review of radiographic studies, pulse oximetry, re-evaluation of patient's condition. This critical care time did not overlap with that of any other provider or involve time for any procedures.     Ambreen Spear, EDEL  Critical Care Medicine  [facility] - Medical ICU

## 2024-11-28 NOTE — SUBJECTIVE & OBJECTIVE
Interval History/Significant Events: Intubated 11/27 for hypoxic respiratory failure. CT concerning for ARDS. Pt requiring low dose Levophed.     Review of Systems   Unable to perform ROS: Intubated     Objective:     Vital Signs (Most Recent):  Temp: 98.9 °F (37.2 °C) (11/28/24 0400)  Pulse: 92 (11/28/24 0800)  Resp: (!) 26 (11/28/24 0800)  BP: 99/68 (11/28/24 0400)  SpO2: 98 % (11/28/24 0800) Vital Signs (24h Range):  Temp:  [98 °F (36.7 °C)-99.6 °F (37.6 °C)] 98.9 °F (37.2 °C)  Pulse:  [81-96] 92  Resp:  [0-54] 26  SpO2:  [95 %-100 %] 98 %  BP: ()/(53-92) 99/68  Arterial Line BP: ()/(55-73) 105/62   Weight: 77.6 kg (171 lb 1.2 oz)  Body mass index is 27.61 kg/m².      Intake/Output Summary (Last 24 hours) at 11/28/2024 0840  Last data filed at 11/28/2024 0701  Gross per 24 hour   Intake 2892.14 ml   Output 4033 ml   Net -1140.86 ml          Physical Exam  Constitutional:       Appearance: He is ill-appearing.   Eyes:      Pupils: Pupils are equal, round, and reactive to light.   Cardiovascular:      Rate and Rhythm: Normal rate and regular rhythm.      Pulses: Normal pulses.      Heart sounds: Normal heart sounds.   Pulmonary:      Breath sounds: Wheezing present.      Comments: Intubated on mechanical ventilation  Bilateral Chest tubes to suction.  Abdominal:      General: Bowel sounds are normal.      Palpations: Abdomen is soft.      Comments: KENNY drain x 2   Genitourinary:     Comments: Johansen catheter with clear yellow urine  Musculoskeletal:         General: Normal range of motion.   Skin:     General: Skin is warm and dry.      Coloration: Skin is pale.   Neurological:      Comments: Sedated            Vents:  Vent Mode: A/C (11/28/24 0800)  Ventilator Initiated: Yes (11/27/24 1522)  Set Rate: 18 BPM (11/28/24 0800)  Vt Set: 385 mL (11/27/24 1522)  PEEP/CPAP: 8 cmH20 (11/28/24 0800)  Oxygen Concentration (%): 40 (11/28/24 0800)  Peak Airway Pressure: 34 cmH20 (11/28/24 0800)  Plateau Pressure: 34  cmH20 (11/28/24 0800)  Total Ve: 12.3 L/m (11/28/24 0800)  F/VT Ratio<105 (RSBI): (!) 71.43 (11/28/24 0800)  Lines/Drains/Airways       Central Venous Catheter Line  Duration             Percutaneous Central Line - Triple Lumen    Internal Jugular Right -- days              Drain  Duration                  Chest Tube   Left Midaxillary -- days         Chest Tube   Right Midaxillary -- days         Closed/Suction Drain   Right Abdomen Bulb -- days         Closed/Suction Drain  Left Abdomen Bulb -- days         Urethral Catheter 11/23/24 1313 Temperature probe 4 days              Airway  Duration                  Airway - Non-Surgical 11/27/24 1520 Endotracheal Tube <1 day              Arterial Line  Duration             Arterial Line 11/27/24 1810 Left Radial <1 day              Peripheral Intravenous Line  Duration                  Peripheral IV - Single Lumen 11/23/24 0901 18 G 1 3/4 in No Left Antecubital 4 days                  Significant Labs:    CBC/Anemia Profile:  Recent Labs   Lab 11/27/24  0320 11/27/24  1500 11/28/24  0416   WBC 25.79* 27.73* 32.99*   HGB 8.3* 7.8* 8.3*   HCT 26.4* 24.7* 25.6*    335 337   MCV 85 84 82   RDW 21.7* 21.5* 21.9*        Chemistries:  Recent Labs   Lab 11/27/24 0320 11/27/24  1500 11/28/24  0416   * 147* 148*   K 3.6 3.5 3.1*    105 105   CO2 30* 34* 30*   BUN 32* 39* 46*   CREATININE 1.7* 1.7* 1.8*   CALCIUM 8.7 8.6* 9.1   ALBUMIN 1.6* 1.5* 1.5*   PROT 6.5 6.6 6.8   BILITOT 1.9* 1.8* 2.8*   ALKPHOS 156* 148 153*   ALT 37 29 29   AST 71* 60* 62*   MG 2.1 2.0 2.1   PHOS 3.5 4.0 4.3       All pertinent labs within the past 24 hours have been reviewed.    Significant Imaging:  I have reviewed all pertinent imaging results/findings within the past 24 hours.

## 2024-11-28 NOTE — PLAN OF CARE
MICU DAILY GOALS     Family/Goals of care/Code Status   Code Status: Full Code    24H Vital Sign Range  Temp:  [98.6 °F (37 °C)-99.6 °F (37.6 °C)]   Pulse:  []   Resp:  [0-54]   BP: ()/(53-92)   SpO2:  [85 %-100 %]      Shift Events (include procedures and significant events)   Oxygen requirements and WOB increasing throughout shift. Pt placed on comfort flow and later intubated. Levo + Propofol gtts started. CT chest/abd/pelvis + Chest xray done.     AWAKE RASS: Goal - RASS Goal: 0-->alert and calm  Actual - RASS ([place] Agitation-Sedation Scale): moderate sedation    Restraint necessity: Not necessary   BREATHE SBT: Not attempted    Coordinate A & B, analgesics/sedatives Pain: managed   SAT: Not attempted   Delirium CAM-ICU: Overall CAM-ICU: Positive   Early(intubated/ Progressive (non-intubated) Mobility MOVE Screen (INTUBATED ONLY): Not attempted    Activity: Activity Management: Rolling - L1   Feeding/Nutrition Diet order: Diet/Nutrition Received: NPO,     Thrombus DVT prophylaxis: VTE Core Measure: Pharmacological prophylaxis initiated/maintained   HOB Elevation Head of Bed (HOB) Positioning: HOB at 30 degrees   Ulcer Prophylaxis GI: yes   Glucose control managed Glycemic Management: blood glucose monitored   Skin Skin assessment:     Sacrum intact/not altered? Yes  Heels intact/not altered? Yes  Surgical wound? Yes    CHECK ONE!   (no altered skin or altered skin) and sub boxes:  [] No Altered Skin Integrity Present    []Prevention Measures Documented    [x] Altered Skin Integrity Present or Discovered   [x] LDA present in EPIC, daily doc completed              [] LDA added if not in EPIC (describe wound).                    When describing wound, do not stage, use descriptive words only.    [] Wound Image Taken (required on admit,                   transfer/discharge and every Tuesday)    Wound Care Consulted? Yes   Bowel Function no issues    Indwelling Catheter Necessity      Urethral  Catheter 11/23/24 1313 Temperature probe-Reason for Continuing Urinary Catheterization: Critically ill in ICU and requiring hourly monitoring of intake/output  [REMOVED]      Urethral Catheter  -Reason for Continuing Urinary Catheterization: Critically ill in ICU and requiring hourly monitoring of intake/output    Percutaneous Central Line - Triple Lumen    Internal Jugular Right-Line Necessity Review: Poor venous access     De-escalation Antibiotics Yes        VS and assessment per flow sheet, patient progressing towards goals as tolerated, plan of care reviewed with  patient and family , all concerns addressed, will continue to monitor.

## 2024-11-28 NOTE — ASSESSMENT & PLAN NOTE
Reported EF of 15-20% at outside facility.  Hemodynamically stable.  Echo 11/25/24:    Left Ventricle: normal in size. Ventricular mass is normal. Normal wall thickness. There is concentric remodeling. Regional wall motion abnormalities present. Septal motion is abnormal. There is low normal systolic function with a visually estimated ejection fraction of 50 - 55%. There is indeterminate diastolic function.    Right Ventricle: Normal size and function    Left Atrium: Normal left atrial size.    Right Atrium: Normal - upper limits of normal right atrial size.    Aortic Valve: The aortic valve is a trileaflet valve. There is moderate aortic valve sclerosis. Aortic valve peak velocity is 1.4 m/s. Mean gradient is 5.9 mmHg.    Mitral Valve: The mitral valve is structurally normal. There is no significant regurgitation.    Tricuspid Valve: The tricuspid valve is structurally normal.    Pulmonic Valve: The pulmonic valve is structurally normal. There is no significant regurgitation.    Pulmonary Artery: The estimated pulmonary artery systolic pressure is 35 mmHg.    IVC/SVC: Normal venous pressure at 3 mmHg.    - Strict I/Os  - Latest BNP 1484  - Continue Lasix 80 mg q12

## 2024-11-28 NOTE — ASSESSMENT & PLAN NOTE
Patient with Hypoxic Respiratory failure which is Acute.  he is not on home oxygen. Supplemental oxygen was provided and noted- Vent Mode: A/C  Oxygen Concentration (%):  [] 40  Resp Rate Total:  [22 br/min-40 br/min] 34 br/min  Vt Set:  [385 mL] 385 mL  PEEP/CPAP:  [8 cmH20] 8 cmH20  Mean Airway Pressure:  [9 lzV96-86 cmH20] 18 cmH20  .   Signs/symptoms of respiratory failure include- tachypnea, increased work of breathing, and respiratory distress. Contributing diagnoses includes - Aspiration, CHF, Pleural effusion, and Pneumonia Labs and images were reviewed. Patient Has recent ABG, which has been reviewed. Will treat underlying causes and adjust management of respiratory failure as follows-     Overnight with increasing O2 requirements up to 10L HF NC. Increased WOB. Coarse breath sounds upon auscultation. Most recent chest xray with worsening patchy areas of alveolar consolidation. BNP 1484. Likely fluid volume overload vs. Aspiration. CT scan concerning for ARDS    - Maintain spO2 >90%  - Diuresis  - Duonebs for wheezing  - Maintains on BSA; dapto/wilda/hannah  - Sputum culture pending  - Lung protective ventilation  - Wean oxygen for SpO2 >90%  - Daily SAT/SBT  - ABG/VBG prn

## 2024-11-28 NOTE — ASSESSMENT & PLAN NOTE
Jose Hernández is a 69 year old man with alcohol use disorder, CVA, cdiff (2021) and hypertension transferred from [facility] for esophageal perforation. Found to have pneumoperitoneum/pneumomediastinum on imaging concerning for perforated viscus and loculated pleural effusion s/p robotic lap on 11/10. Had gangrenous thoracic esophagus/hiatal hernia, esophageal ulcer perforation that was repaired with omental patch (op note significant large intraabdominal/mediastinal pus with food stuffs/pills removed from mediastinum, s/p EGD with reported no evidence of intra-esophageal leak, no OR culture sent. Prior hospital course notable for shock, respiratry failure s/p intubation now extubated. Repeat CT c/w esophageal leak. Labs notable for persistent leukocytosis. Blood cultures at [facility] and here are negative. Had esophageal stent placement 11/25 with plan for esophagram on 11/29. Respiratory status worsened requiring intubation on 11/27. Repeat CT chest 11/27 demonstrated that right chest tube is no longer in the R loculated fluid collection and persistence of perigastric fluid collection.     Recommendations:  - start daptomycin 8mg/kg q24 hours, weekly CK ordered   - continue meropenem 2g q12 (renal dosing), and micafungin 100 mg q24 hours   - would obtain cultures from empyema and perigastric fluid collections via new drain placement if in line with goals of care

## 2024-11-28 NOTE — SUBJECTIVE & OBJECTIVE
Interval History:  Intubated overnight.  Appears to be developing ARDS.  WBC increased to 32 today.  CT yesterday demonstrates no increasing fluid accumulations and chest tube/KENNY drains in appropriate position.  He now has an esophageal stent in place.  He is now requiring 0.15 of levo.    Medications:  Continuous Infusions:   NORepinephrine bitartrate-NaCl  0-0.2 mcg/kg/min Intravenous Continuous 29.1 mL/hr at 11/28/24 1123 0.1 mcg/kg/min at 11/28/24 1123    propofoL  0-50 mcg/kg/min Intravenous Continuous 16.3 mL/hr at 11/28/24 1122 35 mcg/kg/min at 11/28/24 1122    TPN ADULT CENTRAL LINE CUSTOM   Intravenous Continuous 45 mL/hr at 11/28/24 1101 Rate Verify at 11/28/24 1101    TPN ADULT CENTRAL LINE CUSTOM   Intravenous Continuous         Scheduled Meds:   DAPTOmycin (CUBICIN) IV (PEDS and ADULTS)  8 mg/kg Intravenous Q24H    fat emulsion 20%  250 mL Intravenous Daily    furosemide (LASIX) injection  80 mg Intravenous Q12H    heparin (porcine)  5,000 Units Subcutaneous Q8H    meropenem IV (PEDS and ADULTS)  2 g Intravenous Q12H    micafungin  100 mg Intravenous Q24H    pantoprazole  40 mg Intravenous BID     PRN Meds:  Current Facility-Administered Medications:     fentaNYL, 50 mcg, Intravenous, Q1H PRN    HYDROmorphone, 0.5 mg, Intravenous, Q6H PRN    iohexol, 15 mL, Oral, PRN    sodium chloride 0.9%, 10 mL, Intravenous, PRN     Review of patient's allergies indicates:  No Known Allergies  Objective:     Vital Signs (Most Recent):  Temp: 98.9 °F (37.2 °C) (11/28/24 0400)  Pulse: 95 (11/28/24 1111)  Resp: (!) 31 (11/28/24 1325)  BP: 99/68 (11/28/24 0400)  SpO2: 99 % (11/28/24 1111) Vital Signs (24h Range):  Temp:  [98 °F (36.7 °C)-98.9 °F (37.2 °C)] 98.9 °F (37.2 °C)  Pulse:  [81-95] 95  Resp:  [0-34] 31  SpO2:  [95 %-100 %] 99 %  BP: ()/(53-92) 99/68  Arterial Line BP: ()/(55-73) 105/62     Intake/Output - Last 3 Shifts         11/26 0700 11/27 0659 11/27 0700 11/28 0659 11/28 0700 11/29 0659     P.O.   0    I.V. (mL/kg) 44.8 (0.6) 1194.2 (15.4) 300.5 (3.9)    IV Piggyback 689.6 352.8 281.7    TPN 1444.2 1532.8 304.8    Total Intake(mL/kg) 2178.6 (28.1) 3079.9 (39.7) 887 (11.4)    Urine (mL/kg/hr) 4085 (2.2) 4253 (2.3) 510 (1)    Drains 50 20     Stool 0      Chest Tube  0 32    Total Output 4135 4273 542    Net -1956.4 -1193.1 +345           Stool Occurrence 1 x              SpO2: 99 %        Physical Exam  Vitals reviewed.   Constitutional:       Appearance: He is ill-appearing.   HENT:      Head: Normocephalic.      Mouth/Throat:      Comments: ETT  Pulmonary:      Comments: Mechanically ventilated. Bilateral chest tubes, left-sided tube with 200 cc serosanguineous output.  Right-sided tube with minimal output  Abdominal:      General: There is no distension.      Palpations: Abdomen is soft.      Comments: Bilateral KENNY drains with serosanguinous output   Genitourinary:     Comments: douglass  Skin:     General: Skin is warm.      Findings: No rash.            Significant Labs:  CBC:   Recent Labs   Lab 11/28/24 0416   WBC 32.99*   RBC 3.11*   HGB 8.3*   HCT 25.6*      MCV 82   MCH 26.7*   MCHC 32.4     CMP:   Recent Labs   Lab 11/28/24 0416   *   CALCIUM 9.1   ALBUMIN 1.5*   PROT 6.8   *   K 3.1*   CO2 30*      BUN 46*   CREATININE 1.8*   ALKPHOS 153*   ALT 29   AST 62*   BILITOT 2.8*       Significant Diagnostics:  I have reviewed all pertinent imaging results/findings within the past 24 hours.    VTE Risk Mitigation (From admission, onward)           Ordered     heparin (porcine) injection 5,000 Units  Every 8 hours         11/26/24 0856

## 2024-11-28 NOTE — ASSESSMENT & PLAN NOTE
69 yoM s/p primary repair of esophageal perforation at [facility] on 11/10. Transferred for concern of ongoing leak. No access to outside imaging at this time. Overall clinically stable in the MICU. Not on pressors. Minimal respiratory support. Case discussed with Dr. Goldberg and reviewed with family. Patient is quite frail with poor cardiac function and would not tolerate surgical intervention well, therefore minimally invasive procedures would be preferable.    - underwent stent with AES, large defect near GEJ  - unfortunately, there are few surgical options for this patient as he has already failed attempted surgical repair  - CT shows no enlarging fluid accumulations with drains in appropriate position.  - Care per MICU  - Thoracic surgery will follow

## 2024-11-29 NOTE — PLAN OF CARE
MICU DAILY GOALS     Family/Goals of care/Code Status   Code Status: Full Code    24H Vital Sign Range  Temp:  [98.2 °F (36.8 °C)-100 °F (37.8 °C)]   Pulse:  []   Resp:  [22-42]   SpO2:  [88 %-99 %]   Arterial Line BP: (102-127)/(47-62)      Shift Events (include procedures and significant events)   No acute events throughout shift    AWAKE RASS: Goal - RASS Goal: -2-->light sedation  Actual - RASS ([place] Agitation-Sedation Scale): moderate sedation    Restraint necessity:    BREATHE SBT: No    Coordinate A & B, analgesics/sedatives Pain: managed   SAT: Not attempted   Delirium CAM-ICU: Overall CAM-ICU: Positive   Early(intubated/ Progressive (non-intubated) Mobility MOVE Screen (INTUBATED ONLY): Not attempted    Activity: Activity Management: Patient unable to perform activities   Feeding/Nutrition Diet order: Diet/Nutrition Received: NPO,     Thrombus DVT prophylaxis: VTE Core Measure: Pharmacological prophylaxis initiated/maintained   HOB Elevation Head of Bed (HOB) Positioning: HOB at 30-45 degrees   Ulcer Prophylaxis GI: yes   Glucose control managed Glycemic Management: blood glucose monitored   Skin Skin assessment:     Sacrum intact/not altered? Yes  Heels intact/not altered? Yes  Surgical wound? Yes    CHECK ONE!   (no altered skin or altered skin) and sub boxes:  [x] No Altered Skin Integrity Present    []Prevention Measures Documented    [] Altered Skin Integrity Present or Discovered   [] LDA present in EPIC, daily doc completed              [] LDA added if not in EPIC (describe wound).                    When describing wound, do not stage, use descriptive words only.    [] Wound Image Taken (required on admit,                   transfer/discharge and every Tuesday)    Wound Care Consulted? Yes   Bowel Function no issues    Indwelling Catheter Necessity      Urethral Catheter 11/23/24 1313 Temperature probe-Reason for Continuing Urinary Catheterization: Critically ill in ICU and requiring  hourly monitoring of intake/output  [REMOVED]      Urethral Catheter  -Reason for Continuing Urinary Catheterization: Critically ill in ICU and requiring hourly monitoring of intake/output    Percutaneous Central Line - Triple Lumen    Internal Jugular Right-Line Necessity Review: Hemodynamic instability, Medication caustic to vasculature     De-escalation Antibiotics Yes        VS and assessment per flow sheet, patient progressing towards goals as tolerated, plan of care reviewed with  no learner available , all concerns addressed, will continue to monitor.

## 2024-11-29 NOTE — H&P
[facility] - Medical ICU  Critical Care Medicine  History & Physical    Patient Name: Jose Hernández Sr.  MRN: [id number]  Admission Date: 11/23/2024  Hospital Length of Stay: 6 days  Code Status: Full Code  Attending Physician: Allison Don MD   Primary Care Provider: Tong Cantrell MD   Principal Problem: Esophageal perforation    Subjective:     HPI:  Mr. Hernández is a 69yoM with a PMHx of alcohol use disorder, chronic back pain 2/2 to spinal stenosis, HLD, HTN, and CVA (on plavix) with no residual deficits who presented to OSH on 11/10/24 with epigastric pain and melena; found to have an esophageal perforation. The patient was initally hypotensive, anemic to 5.7, hyperbilirubinemia, lactic acidosis to 9.9. CTAP showed a pneumoperitoneum and pneumomediastinum consistent with a perforated viscus, hiatal hernia with surrounding external gas, and loculated pleural effusions. He was transfused and started on octreotide gtt, protonix, ativan, doxy, zosyn, hannah, vanc, precedex, with AC held. For the loculated pleural effusions, bilateral chest tubes were placed. Diagnostic laparoscopy showed hiatal hernia with gangrene in the lower thoracic esophagus and a perforated esophageal ulcer in the lower thoracic area, and mediastinal fluid pus, food, and pills. He underwent surgical repair on 11/10/24. Additionally, he was found to have HFrEF (EF = 20%).The patient was later extubated but then developed AMS. They repeated imaging with extraluminal air adjacent to the GE junction with increased fluid in the lower posterior mediastium concerning for recurrent esophageal perforation.   On 11/23/24, the patient was transferred to [facility] for AES and CTS consults s/t concern for perforated esophagus and loculated pleural effusions    Per chart review from OSH the patient stopped taking his medications a month before presentation because he thought they were making him weak. Other ROS positive for paleness for 3 weeks prior to  presentation. The patient provided supplemental history and reported that the patient was not eating or drinking except EtOH, drinks 2-3 bottles of wine and 2-4 beers per day.    Hospital/ICU Course:  Mr. Hernández is a 69yoM with PMHx significant for alcohol use disorder, HLD, HTN, and CVA (on plavix) who presented as a transfer for perforated esophageal ulcer. Bilateral chest tubes and bilateral KENNY drains. CT Chest/Abd/Pelvis: Esophageal leak into the paraesophageal posterior mediastinum and likely bilateral pleural spaces. Multifocal ground-glass opacities concerning for acute infectious or inflammatory process.  Small basilar pleural effusions and mild right lower lobe consolidation. ID, Thoracic Surgery, AES consulted. On 11/25, AES successfully placed an esophageal stent. Long-term nutrition will need to be addressed. Overnight with increased WOB and intermittent hypoxia. Chest xray with worsening patchy areas of alveolar consolidation; BNP 1484 - responsive to lasix.Intubated 11/27. CT concerning for ARDS. On vasopressors.      Interval History/Significant Events: Plan for CT esophagram today. IR planning on drainage of pleural fluid collection.     Review of Systems   Unable to perform ROS: Intubated     Objective:     Vital Signs (Most Recent):  Temp: 98.5 °F (36.9 °C) (11/29/24 1101)  Pulse: 91 (11/29/24 1201)  Resp: (!) 27 (11/29/24 1201)  BP: 99/68 (11/28/24 0400)  SpO2: 95 % (11/29/24 1201) Vital Signs (24h Range):  Temp:  [98 °F (36.7 °C)-100 °F (37.8 °C)] 98.5 °F (36.9 °C)  Pulse:  [] 91  Resp:  [19-42] 27  SpO2:  [88 %-98 %] 95 %  Arterial Line BP: (100-135)/(47-68) 105/56   Weight: 77.6 kg (171 lb 1.2 oz)  Body mass index is 27.61 kg/m².      Intake/Output Summary (Last 24 hours) at 11/29/2024 1338  Last data filed at 11/29/2024 1201  Gross per 24 hour   Intake 3001.99 ml   Output 2178 ml   Net 823.99 ml          Physical Exam  Constitutional:       Appearance: He is ill-appearing.   Eyes:       Pupils: Pupils are equal, round, and reactive to light.   Cardiovascular:      Rate and Rhythm: Normal rate and regular rhythm.      Pulses: Normal pulses.      Heart sounds: Normal heart sounds.   Pulmonary:      Breath sounds: Wheezing present.      Comments: Intubated on mechanical ventilation  Bilateral Chest tubes to suction.  Abdominal:      General: Bowel sounds are normal.      Palpations: Abdomen is soft.      Comments: KENNY drain x 2   Genitourinary:     Comments: Johansen catheter with clear yellow urine  Musculoskeletal:         General: Normal range of motion.   Skin:     General: Skin is warm and dry.      Coloration: Skin is pale.   Neurological:      Comments: Sedated            Vents:  Vent Mode: A/C (11/29/24 1121)  Ventilator Initiated: Yes (11/27/24 1522)  Set Rate: 18 BPM (11/29/24 1121)  Vt Set: 385 mL (11/27/24 1522)  PEEP/CPAP: 8 cmH20 (11/29/24 1121)  Oxygen Concentration (%): 40 (11/29/24 1201)  Peak Airway Pressure: 37 cmH20 (11/29/24 1121)  Plateau Pressure: 34 cmH20 (11/29/24 1121)  Total Ve: 12.4 L/m (11/29/24 1121)  F/VT Ratio<105 (RSBI): (!) 78.8 (11/29/24 1121)  Lines/Drains/Airways       Central Venous Catheter Line  Duration             Percutaneous Central Line - Triple Lumen    Internal Jugular Right -- days              Drain  Duration                  Chest Tube   Left Midaxillary -- days         Chest Tube   Right Midaxillary -- days         Closed/Suction Drain   Right Abdomen Bulb -- days         Closed/Suction Drain  Left Abdomen Bulb -- days         Urethral Catheter 11/23/24 1313 Temperature probe 6 days              Airway  Duration                  Airway - Non-Surgical 11/27/24 1520 Endotracheal Tube 1 day              Arterial Line  Duration             Arterial Line 11/27/24 1810 Left Radial 1 day              Peripheral Intravenous Line  Duration                  Peripheral IV - Single Lumen 11/23/24 0901 18 G 1 3/4 in No Left Antecubital 6 days                   Significant Labs:    CBC/Anemia Profile:  Recent Labs   Lab 11/27/24  1500 11/28/24  0416 11/29/24  0310   WBC 27.73* 32.99* 23.47*   HGB 7.8* 8.3* 7.7*   HCT 24.7* 25.6* 23.6*    337 287   MCV 84 82 81*   RDW 21.5* 21.9* 22.3*        Chemistries:  Recent Labs   Lab 11/27/24  1500 11/28/24  0416 11/29/24  0310 11/29/24  1210   * 148* 152* 148*   K 3.5 3.1* 3.2* 3.5    105 105 108   CO2 34* 30* 29 26   BUN 39* 46* 66* 63*   CREATININE 1.7* 1.8* 2.2* 2.1*   CALCIUM 8.6* 9.1 8.6* 8.9   ALBUMIN 1.5* 1.5* 1.3*  --    PROT 6.6 6.8 6.8  --    BILITOT 1.8* 2.8* 2.6*  --    ALKPHOS 148 153* 156*  --    ALT 29 29 28  --    AST 60* 62* 68*  --    MG 2.0 2.1 2.0  --    PHOS 4.0 4.3 4.0  --        All pertinent labs within the past 24 hours have been reviewed.    Significant Imaging:  I have reviewed all pertinent imaging results/findings within the past 24 hours.  Assessment/Plan:     Neuro  History of CVA (cerebrovascular accident)  CVA in 2012. No residual deficits.     - Holding home plavix/ASA while determining pending surgical/endoscopic interventions   - Neuro checks    Psychiatric  Alcohol use disorder  Per chart review, drinks 2-3 bottles of wine and 3-4 beers per day. Last drink around 11/8/24. Treated with Ativan at OSH.     - Liver ultrasound; without signs of portal HTN  - Monitor for s/s of withdrawal  - Seizure precautions  - CIWA  - Continue folic acid and thiamine    Pulmonary  Acute hypoxemic respiratory failure  Patient with Hypoxic Respiratory failure which is Acute.  he is not on home oxygen. Supplemental oxygen was provided and noted- Vent Mode: A/C  Oxygen Concentration (%):  [] 40  Resp Rate Total:  [22 br/min-40 br/min] 34 br/min  Vt Set:  [385 mL] 385 mL  PEEP/CPAP:  [8 cmH20] 8 cmH20  Mean Airway Pressure:  [9 cjM34-05 cmH20] 18 cmH20  .   Signs/symptoms of respiratory failure include- tachypnea, increased work of breathing, and respiratory distress. Contributing diagnoses  includes - Aspiration, CHF, Pleural effusion, and Pneumonia Labs and images were reviewed. Patient Has recent ABG, which has been reviewed. Will treat underlying causes and adjust management of respiratory failure as follows-     Overnight with increasing O2 requirements up to 10L HF NC. Increased WOB. Coarse breath sounds upon auscultation. Most recent chest xray with worsening patchy areas of alveolar consolidation. BNP 1484. Likely fluid volume overload vs. Aspiration. CT scan concerning for ARDS    - Maintain spO2 >90%  - Diuresis  - Duonebs for wheezing  - Maintains on BSA; dapto/wilda/hannah  - Sputum culture pending  - Lung protective ventilation  - Wean oxygen for SpO2 >90%  - Daily SAT/SBT  - ABG/VBG prn    Loculated pleural effusion  CT scans concerning for loculated pleural effusions; bilateral large-bore chest tubes inserted prior to transfer. Draining to suction.    --IR consulted, plan for drainage of pleural collection today 11/29  - CTS consulted; appreciate recs  - Maintain spO2 >90%  - Continue bilateral chest tubes to suction    Cardiac/Vascular  Acute systolic heart failure  Reported EF of 15-20% at outside facility.  Hemodynamically stable.  Echo 11/25/24:    Left Ventricle: normal in size. Ventricular mass is normal. Normal wall thickness. There is concentric remodeling. Regional wall motion abnormalities present. Septal motion is abnormal. There is low normal systolic function with a visually estimated ejection fraction of 50 - 55%. There is indeterminate diastolic function.    Right Ventricle: Normal size and function    Left Atrium: Normal left atrial size.    Right Atrium: Normal - upper limits of normal right atrial size.    Aortic Valve: The aortic valve is a trileaflet valve. There is moderate aortic valve sclerosis. Aortic valve peak velocity is 1.4 m/s. Mean gradient is 5.9 mmHg.    Mitral Valve: The mitral valve is structurally normal. There is no significant regurgitation.    Tricuspid  Valve: The tricuspid valve is structurally normal.    Pulmonic Valve: The pulmonic valve is structurally normal. There is no significant regurgitation.    Pulmonary Artery: The estimated pulmonary artery systolic pressure is 35 mmHg.    IVC/SVC: Normal venous pressure at 3 mmHg.    - Strict I/Os  - Latest BNP 1484  - Hold Lasix  - Repeat ECHO    Hyperlipidemia  Takes statin at home; holding in the setting of previous transaminitis.    - Daily CMP  - Resume medications as appropriate    Essential hypertension  History of HTN, had stopped taking home medications about a month prior to admission at OSH.    - Holding home meds; resume as appropriate  - Maintain SBP <180    Renal/  ALFREDO (acute kidney injury)  Admit creatinine 1.5. Baseline Cr: 0.9.    - Strict I/Os  - Daily CMP  - Avoid nephrotoxic agents  - Renally dose medications  - Hypernatremic; likely s/t volume depletion with dehydration    ID  Sepsis without acute organ dysfunction  CT concerning for for multifocal PNA. Covered with Kade/Apple/Vanc.    - ID consulted; appreciate recs  - Continue BSA; Kade/Apple  - Discontinued Vanc; Initiated Dapto  - Daily CBC    GI  * Esophageal perforation  11/10/24: CT abdomen showed evidence of pneumomediastinum, pneumoperitoneum all concerning for perforated viscus.   - Robotic assisted laparoscopy performed. 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 Sicard).   - General surgery performed EGD immediately following laparoscopy. Report no evidence of intra-esophageal leakage. Bilateral KENNY drains placed.    11/23/24: [place]. [place] [facility] [place] level of care and AES/CTS consults.    - CTS consulted; appreciate recs. They feel he is too frail to undergo surgical management  - AES consulted; appreciate recs. Esophageal stent placed successfully on 11/25, plan for CT esophagram 11/29  - Continue protonix BID  - Monitor for bleeding;  trend CBCs  - Transfuse for hgb <7  - Monitor KENNY drains       Critical Care Time: 65 minutes  Critical secondary to Patient has a condition that poses threat to life and bodily function: Acute hypoxic respiratory failure    Critical care was time spent personally by me on the following activities: development of treatment plan with patient or surrogate and bedside caregivers, discussions with consultants, evaluation of patient's response to treatment, examination of patient, ordering and performing treatments and interventions, ordering and review of laboratory studies, ordering and review of radiographic studies, pulse oximetry, re-evaluation of patient's condition. This critical care time did not overlap with that of any other provider or involve time for any procedures.     Ambreen Spear, EDEL  Critical Care Medicine  [facility] - Medical ICU

## 2024-11-29 NOTE — SUBJECTIVE & OBJECTIVE
Interval History:  No acute events overnight.  Remains intubated and sedated.  Chest tubes in place with about 50-60 cc from each side, does not appear grossly purulent.  Currently on levo at 0.1 which is down from yesterday, and can be likely weaned more as he was hypertensive on my evaluation.  WBC down to 23 from 30/3 yesterday.  Afebrile    Medications:  Continuous Infusions:   NORepinephrine bitartrate-D5W  0-3 mcg/kg/min Intravenous Continuous 29.1 mL/hr at 11/29/24 0801 0.1 mcg/kg/min at 11/29/24 0801    propofoL  0-50 mcg/kg/min Intravenous Continuous 23.3 mL/hr at 11/29/24 0801 50 mcg/kg/min at 11/29/24 0801    TPN ADULT CENTRAL LINE CUSTOM   Intravenous Continuous 45 mL/hr at 11/29/24 0801 Rate Verify at 11/29/24 0801     Scheduled Meds:   DAPTOmycin (CUBICIN) IV (PEDS and ADULTS)  8 mg/kg Intravenous Q24H    fat emulsion 20%  250 mL Intravenous Daily    furosemide (LASIX) injection  80 mg Intravenous Q12H    heparin (porcine)  5,000 Units Subcutaneous Q8H    meropenem IV (PEDS and ADULTS)  2 g Intravenous Q12H    micafungin  100 mg Intravenous Q24H    pantoprazole  40 mg Intravenous BID     PRN Meds:  Current Facility-Administered Medications:     fentaNYL, 50 mcg, Intravenous, Q1H PRN    HYDROmorphone, 0.5 mg, Intravenous, Q6H PRN    iohexol, 15 mL, Oral, PRN    sodium chloride 0.9%, 10 mL, Intravenous, PRN     Review of patient's allergies indicates:  No Known Allergies  Objective:     Vital Signs (Most Recent):  Temp: 98 °F (36.7 °C) (11/29/24 0701)  Pulse: 89 (11/29/24 0801)  Resp: (!) 27 (11/29/24 0801)  BP: 99/68 (11/28/24 0400)  SpO2: (!) 92 % (11/29/24 0801) Vital Signs (24h Range):  Temp:  [98 °F (36.7 °C)-100 °F (37.8 °C)] 98 °F (36.7 °C)  Pulse:  [] 89  Resp:  [19-42] 27  SpO2:  [88 %-99 %] 92 %  Arterial Line BP: (102-135)/(47-68) 135/68     Intake/Output - Last 3 Shifts         11/27 0700 11/28 0659 11/28 0700 11/29 0659 11/29 0700 11/30 0659    P.O.  0 0    I.V. (mL/kg) 1194.2  (15.4) 1081.2 (13.9) 105.7 (1.4)    IV Piggyback 352.8 469.9 39.7    TPN 1532.8 1327.3 132.7    Total Intake(mL/kg) 3079.9 (39.7) 2878.5 (37.1) 278.1 (3.6)    Urine (mL/kg/hr) 4253 (2.3) 1825 (1) 400 (3.1)    Drains 20 33     Stool  0     Chest Tube 0 112     Total Output 4273 1970 400    Net -1193.1 +908.5 -121.9           Stool Occurrence  1 x             SpO2: (!) 92 %        Physical Exam  Vitals reviewed.   HENT:      Head: Normocephalic.      Mouth/Throat:      Comments: ETT  Pulmonary:      Comments: Mechanically ventilated. Bilateral chest tubes, both tubes with ss output.   Abdominal:      General: There is no distension.      Palpations: Abdomen is soft.      Comments: Bilateral KENNY drains with serosanguinous output   Genitourinary:     Comments: douglass  Skin:     General: Skin is warm.      Findings: No rash.            Significant Labs:  CBC:   Recent Labs   Lab 11/29/24 0310   WBC 23.47*   RBC 2.93*   HGB 7.7*   HCT 23.6*      MCV 81*   MCH 26.3*   MCHC 32.6     CMP:   Recent Labs   Lab 11/29/24 0310   *   CALCIUM 8.6*   ALBUMIN 1.3*   PROT 6.8   *   K 3.2*   CO2 29      BUN 66*   CREATININE 2.2*   ALKPHOS 156*   ALT 28   AST 68*   BILITOT 2.6*       Significant Diagnostics:  I have reviewed all pertinent imaging results/findings within the past 24 hours.    VTE Risk Mitigation (From admission, onward)           Ordered     heparin (porcine) injection 5,000 Units  Every 8 hours         11/26/24 0832

## 2024-11-29 NOTE — ASSESSMENT & PLAN NOTE
11/10/24: CT abdomen showed evidence of pneumomediastinum, pneumoperitoneum all concerning for perforated viscus.   - Robotic assisted laparoscopy performed. 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 Sicard).   - General surgery performed EGD immediately following laparoscopy. Report no evidence of intra-esophageal leakage. Bilateral KENNY drains placed.    11/23/24: Pt. Transferred [place] [facility] for higher level of care and AES/CTS consults.    - CTS consulted; appreciate recs. They feel he is too frail to undergo surgical management  - AES consulted; appreciate recs. Esophageal stent placed successfully on 11/25, plan for CT esophagram 11/29  - Continue protonix BID  - Monitor for bleeding; trend CBCs  - Transfuse for hgb <7  - Monitor KENNY drains

## 2024-11-29 NOTE — NURSING
Right pleural chest tube placement complete. Pt tolerated well. VSS. No signs or symptoms of distress noted. Pt will be transferred back to ICU bed escorted by ICU RN and resp therapy.

## 2024-11-29 NOTE — PROGRESS NOTES
[facility] - Medical ICU  Thoracic Surgery  Progress Note    Subjective:     History of Present Illness:  Mr. Hernández is a 69 yoM with PMH significant for chronic alcohol abuse and NSAID use who suffered a perforated esophageal ulcer and underwent promary repair with bilateral chest tube placement and bilateral intraabdominal drain placement on 11/10 at an [facility]. He was transferred here for concern of persistent leak and potential AES, IR, and CTS intervention. Of note, his EF was found to be 20% on [facility] echo. He was transferred to the MICU.     Post-Op Info:  Procedure(s) (LRB):  EGD (ESOPHAGOGASTRODUODENOSCOPY) (N/A)   4 Days Post-Op     Interval History:  No acute events overnight.  Remains intubated and sedated.  Chest tubes in place with about 50-60 cc from each side, does not appear grossly purulent.  Currently on levo at 0.1 which is down from yesterday, and can be likely weaned more as he was hypertensive on my evaluation.  WBC down to 23 from 30/3 yesterday.  Afebrile    Medications:  Continuous Infusions:   NORepinephrine bitartrate-D5W  0-3 mcg/kg/min Intravenous Continuous 29.1 mL/hr at 11/29/24 0801 0.1 mcg/kg/min at 11/29/24 0801    propofoL  0-50 mcg/kg/min Intravenous Continuous 23.3 mL/hr at 11/29/24 0801 50 mcg/kg/min at 11/29/24 0801    TPN ADULT CENTRAL LINE CUSTOM   Intravenous Continuous 45 mL/hr at 11/29/24 0801 Rate Verify at 11/29/24 0801     Scheduled Meds:   DAPTOmycin (CUBICIN) IV (PEDS and ADULTS)  8 mg/kg Intravenous Q24H    fat emulsion 20%  250 mL Intravenous Daily    furosemide (LASIX) injection  80 mg Intravenous Q12H    heparin (porcine)  5,000 Units Subcutaneous Q8H    meropenem IV (PEDS and ADULTS)  2 g Intravenous Q12H    micafungin  100 mg Intravenous Q24H    pantoprazole  40 mg Intravenous BID     PRN Meds:  Current Facility-Administered Medications:     fentaNYL, 50 mcg, Intravenous, Q1H PRN    HYDROmorphone, 0.5 mg, Intravenous, Q6H PRN    iohexol, 15 mL, Oral, PRN    sodium  chloride 0.9%, 10 mL, Intravenous, PRN     Review of patient's allergies indicates:  No Known Allergies  Objective:     Vital Signs (Most Recent):  Temp: 98 °F (36.7 °C) (11/29/24 0701)  Pulse: 89 (11/29/24 0801)  Resp: (!) 27 (11/29/24 0801)  BP: 99/68 (11/28/24 0400)  SpO2: (!) 92 % (11/29/24 0801) Vital Signs (24h Range):  Temp:  [98 °F (36.7 °C)-100 °F (37.8 °C)] 98 °F (36.7 °C)  Pulse:  [] 89  Resp:  [19-42] 27  SpO2:  [88 %-99 %] 92 %  Arterial Line BP: (102-135)/(47-68) 135/68     Intake/Output - Last 3 Shifts         11/27 0700  11/28 0659 11/28 0700  11/29 0659 11/29 0700 11/30 0659    P.O.  0 0    I.V. (mL/kg) 1194.2 (15.4) 1081.2 (13.9) 105.7 (1.4)    IV Piggyback 352.8 469.9 39.7    TPN 1532.8 1327.3 132.7    Total Intake(mL/kg) 3079.9 (39.7) 2878.5 (37.1) 278.1 (3.6)    Urine (mL/kg/hr) 4253 (2.3) 1825 (1) 400 (3.1)    Drains 20 33     Stool  0     Chest Tube 0 112     Total Output 4273 1970 400    Net -1193.1 +908.5 -121.9           Stool Occurrence  1 x             SpO2: (!) 92 %        Physical Exam  Vitals reviewed.   HENT:      Head: Normocephalic.      Mouth/Throat:      Comments: ETT  Pulmonary:      Comments: Mechanically ventilated. Bilateral chest tubes, both tubes with ss output.   Abdominal:      General: There is no distension.      Palpations: Abdomen is soft.      Comments: Bilateral KENNY drains with serosanguinous output   Genitourinary:     Comments: douglass  Skin:     General: Skin is warm.      Findings: No rash.            Significant Labs:  CBC:   Recent Labs   Lab 11/29/24 0310   WBC 23.47*   RBC 2.93*   HGB 7.7*   HCT 23.6*      MCV 81*   MCH 26.3*   MCHC 32.6     CMP:   Recent Labs   Lab 11/29/24 0310   *   CALCIUM 8.6*   ALBUMIN 1.3*   PROT 6.8   *   K 3.2*   CO2 29      BUN 66*   CREATININE 2.2*   ALKPHOS 156*   ALT 28   AST 68*   BILITOT 2.6*       Significant Diagnostics:  I have reviewed all pertinent imaging results/findings within the past 24  hours.    VTE Risk Mitigation (From admission, onward)           Ordered     heparin (porcine) injection 5,000 Units  Every 8 hours         11/26/24 0851                  Assessment/Plan:     * Esophageal perforation  69 yoM s/p primary repair of esophageal perforation at [facility] on [facility]10. Transferred for concern of ongoing leak. No access to outside imaging at this time. Overall clinically stable in the MICU. Not on pressors. Minimal respiratory support. Case discussed with Dr. Goldberg and reviewed with family. Patient is quite frail with poor cardiac function and would not tolerate surgical intervention well, therefore minimally invasive procedures would be preferable.    - underwent stent with AES, large defect near GEJ  - unfortunately, there are few surgical options for this patient as he has already failed attempted surgical repair  - CT shows no enlarging fluid accumulations with drains in appropriate position.  - Care per MICU  - Thoracic surgery will follow        James Mendez MD  Thoracic Surgery  [facility] - Medical ICU

## 2024-11-29 NOTE — SUBJECTIVE & OBJECTIVE
Interval History/Significant Events: Plan for CT esophagram today. IR planning on drainage of pleural fluid collection.     Review of Systems   Unable to perform ROS: Intubated     Objective:     Vital Signs (Most Recent):  Temp: 98.5 °F (36.9 °C) (11/29/24 1101)  Pulse: 91 (11/29/24 1201)  Resp: (!) 27 (11/29/24 1201)  BP: 99/68 (11/28/24 0400)  SpO2: 95 % (11/29/24 1201) Vital Signs (24h Range):  Temp:  [98 °F (36.7 °C)-100 °F (37.8 °C)] 98.5 °F (36.9 °C)  Pulse:  [] 91  Resp:  [19-42] 27  SpO2:  [88 %-98 %] 95 %  Arterial Line BP: (100-135)/(47-68) 105/56   Weight: 77.6 kg (171 lb 1.2 oz)  Body mass index is 27.61 kg/m².      Intake/Output Summary (Last 24 hours) at 11/29/2024 1338  Last data filed at 11/29/2024 1201  Gross per 24 hour   Intake 3001.99 ml   Output 2178 ml   Net 823.99 ml          Physical Exam  Constitutional:       Appearance: He is ill-appearing.   Eyes:      Pupils: Pupils are equal, round, and reactive to light.   Cardiovascular:      Rate and Rhythm: Normal rate and regular rhythm.      Pulses: Normal pulses.      Heart sounds: Normal heart sounds.   Pulmonary:      Breath sounds: Wheezing present.      Comments: Intubated on mechanical ventilation  Bilateral Chest tubes to suction.  Abdominal:      General: Bowel sounds are normal.      Palpations: Abdomen is soft.      Comments: KENNY drain x 2   Genitourinary:     Comments: Johansen catheter with clear yellow urine  Musculoskeletal:         General: Normal range of motion.   Skin:     General: Skin is warm and dry.      Coloration: Skin is pale.   Neurological:      Comments: Sedated            Vents:  Vent Mode: A/C (11/29/24 1121)  Ventilator Initiated: Yes (11/27/24 1522)  Set Rate: 18 BPM (11/29/24 1121)  Vt Set: 385 mL (11/27/24 1522)  PEEP/CPAP: 8 cmH20 (11/29/24 1121)  Oxygen Concentration (%): 40 (11/29/24 1201)  Peak Airway Pressure: 37 cmH20 (11/29/24 1121)  Plateau Pressure: 34 cmH20 (11/29/24 1121)  Total Ve: 12.4 L/m  (11/29/24 1121)  F/VT Ratio<105 (RSBI): (!) 78.8 (11/29/24 1121)  Lines/Drains/Airways       Central Venous Catheter Line  Duration             Percutaneous Central Line - Triple Lumen    Internal Jugular Right -- days              Drain  Duration                  Chest Tube   Left Midaxillary -- days         Chest Tube   Right Midaxillary -- days         Closed/Suction Drain   Right Abdomen Bulb -- days         Closed/Suction Drain  Left Abdomen Bulb -- days         Urethral Catheter 11/23/24 1313 Temperature probe 6 days              Airway  Duration                  Airway - Non-Surgical 11/27/24 1520 Endotracheal Tube 1 day              Arterial Line  Duration             Arterial Line 11/27/24 1810 Left Radial 1 day              Peripheral Intravenous Line  Duration                  Peripheral IV - Single Lumen 11/23/24 0901 18 G 1 3/4 in No Left Antecubital 6 days                  Significant Labs:    CBC/Anemia Profile:  Recent Labs   Lab 11/27/24  1500 11/28/24  0416 11/29/24  0310   WBC 27.73* 32.99* 23.47*   HGB 7.8* 8.3* 7.7*   HCT 24.7* 25.6* 23.6*    337 287   MCV 84 82 81*   RDW 21.5* 21.9* 22.3*        Chemistries:  Recent Labs   Lab 11/27/24  1500 11/28/24  0416 11/29/24  0310 11/29/24  1210   * 148* 152* 148*   K 3.5 3.1* 3.2* 3.5    105 105 108   CO2 34* 30* 29 26   BUN 39* 46* 66* 63*   CREATININE 1.7* 1.8* 2.2* 2.1*   CALCIUM 8.6* 9.1 8.6* 8.9   ALBUMIN 1.5* 1.5* 1.3*  --    PROT 6.6 6.8 6.8  --    BILITOT 1.8* 2.8* 2.6*  --    ALKPHOS 148 153* 156*  --    ALT 29 29 28  --    AST 60* 62* 68*  --    MG 2.0 2.1 2.0  --    PHOS 4.0 4.3 4.0  --        All pertinent labs within the past 24 hours have been reviewed.    Significant Imaging:  I have reviewed all pertinent imaging results/findings within the past 24 hours.

## 2024-11-29 NOTE — PROGRESS NOTES
Please see H&P from today 1350    Ambreen Spear DNP, [facility]  Critical Care Medicine  11/29/2024 3:19 PM

## 2024-11-29 NOTE — ASSESSMENT & PLAN NOTE
CT scans concerning for loculated pleural effusions; bilateral large-bore chest tubes inserted prior to transfer. Draining to suction.    --IR consulted, plan for drainage of pleural collection today 11/29  - CTS consulted; appreciate recs  - Maintain spO2 >90%  - Continue bilateral chest tubes to suction

## 2024-11-29 NOTE — ASSESSMENT & PLAN NOTE
Reported EF of 15-20% at outside facility.  Hemodynamically stable.  Echo 11/25/24:    Left Ventricle: normal in size. Ventricular mass is normal. Normal wall thickness. There is concentric remodeling. Regional wall motion abnormalities present. Septal motion is abnormal. There is low normal systolic function with a visually estimated ejection fraction of 50 - 55%. There is indeterminate diastolic function.    Right Ventricle: Normal size and function    Left Atrium: Normal left atrial size.    Right Atrium: Normal - upper limits of normal right atrial size.    Aortic Valve: The aortic valve is a trileaflet valve. There is moderate aortic valve sclerosis. Aortic valve peak velocity is 1.4 m/s. Mean gradient is 5.9 mmHg.    Mitral Valve: The mitral valve is structurally normal. There is no significant regurgitation.    Tricuspid Valve: The tricuspid valve is structurally normal.    Pulmonic Valve: The pulmonic valve is structurally normal. There is no significant regurgitation.    Pulmonary Artery: The estimated pulmonary artery systolic pressure is 35 mmHg.    IVC/SVC: Normal venous pressure at 3 mmHg.    - Strict I/Os  - Latest BNP 1484  - Hold Lasix  - Repeat ECHO

## 2024-11-29 NOTE — TREATMENT PLAN
AES Treatment Plan    Jose Hernández Sr. is a 69 y.o. male admitted to hospital 11/23/2024 (Hospital Day: 7) due to Esophageal perforation.     HPI: Jose Hernández Sr. is a 70 yo M w/ CHF (EF 20%), chronic NSAID use, alcohol dependence, spinal stenosis, CVA who was admitted to outside hospital on 11/10/2024 with epigastric pain, nausea, vomiting, diarrhea, melena.  He was found to be anemic with a hemoglobin of 5.7, and vitals with blood pressure 70s over 40s, pulse 110 and a lactic acid of 9.9.  CT abdomen showed evidence of pneumomediastinum, pneumoperitoneum with concern for perforated viscus.  Patient was taken to surgery and had a robotic assisted laparotomy performed with bilateral chest tube placement and bilateral intra-abdominal drain placement on 11/10.  They noted gangrene of the lower thoracic esophagus and perforation of an esophageal ulcer which were repaired.  General surgery also did an EGD right after the laparoscopy and found no evidence of intra esophageal leakage.  However patient then went into acute hemorrhagic shock, acute hypoxic respiratory failure and admitted to the ICU.  He was intubated and on pressors and antibiotics but has since been extubated and off pressors and not tolerating NG tube feeds.  Discussion with CT surgery resulted in recommendation of [place] [facility] [place].  AES consulted for possible stent placement if necessary.     s/p EGD 11/25/24 which demonstrated large esophageal perforation- stent placed. Overnight has had increasing respiratory requirements. minimal output from right-sided chest tube.  Some serosanguineous output from left-sided chest tube. Developed ARDS subsequently and now is intubated.       Interval History  NAEON. Continues to be intubated and mechanically ventilated. Requiring Levophed 0.1.     CT with contrast yesterday showed perigastric heterogeneous collection measuring approximately 5.2 x 7.1 cm along the lesser curvature of  the stomach.     Objective  Temp:  [98 °F (36.7 °C)-100 °F (37.8 °C)] 98 °F (36.7 °C) (11/29 0701)  Pulse:  [] 89 (11/29 0801)  Resp:  [19-42] 34 (11/29 0842)  SpO2:  [88 %-99 %] 92 % (11/29 0801)  Arterial Line BP: (102-135)/(47-68) 135/68 (11/29 0801)    Physical Exam  Constitutional:       General: He is intubated and mechanically ventilated.      Appearance: He is cachectic. He is ill-appearing.   Pulmonary:      Comments: Bilateral chest tubes- serosanguinous output; ETT in place  Abdominal:      General: Abdomen is flat. Bowel sounds are normal. There is no distension.      Palpations: Abdomen is soft.      Tenderness: There is no abdominal tenderness.      Comments: Bilateral KENNY drains   Neurological:      Mental Status: He is sedated and intubated.     Laboratory  Lab Results   Component Value Date    WBC 23.47 (H) 11/29/2024    HGB 7.7 (L) 11/29/2024    HCT 23.6 (L) 11/29/2024    MCV 81 (L) 11/29/2024     11/29/2024       Lab Results   Component Value Date    ALT 28 11/29/2024    AST 68 (H) 11/29/2024    ALKPHOS 156 (H) 11/29/2024    BILITOT 2.6 (H) 11/29/2024       Assessment  68 yo M w/ CHF (EF 20%), chronic NSAID use, alcohol dependence, spinal stenosis, CVA who was admitted to outside hospital on 11/10/2024 with epigastric pain, nausea, vomiting, diarrhea, melena and found to have esophageal perforation status post surgery on 11/10. At the outside hospital patient's stay was complicated by acute hypoxic respiratory failure acute hemorrhagic shock and was admitted to the ICU intubated, on pressors, and broad-spectrum antibiotics. Since then has been extubated and off pressors. Now transferred to [facility] [place] [place] for further evaluation with concerns of consistent perforation.      Not a candidate for CT surgery. S/P EGD 11/25/24 which revealed large perforation in the distal esophagus at about 38-40cm from incisors - not amenable to endoscopic closure. 23mm wide x 10cm long partially covered Agile  esophageal stent. Has had worsening of respiratory status, culminating in intubation for ARDS.     Plan  - Obtain CT chest abdomen with PO contrast today. NPO in the interim.   - For drainage of perigastric fluid collection, consider IR consult. EUS guided drainage cannot be performed due to presence of esophageal stent.   - TPN per nutrition and primary team.   - Plan of care was discussed with primary team.  - Repeat upper endoscopy with fluoro in 5-6 weeks at [facility] only for partially covered esophageal stent removal/probable exchange   - We will continue to follow.    Thank you for involving us in the care of Jose Hernández Sr.. Please call with any additional questions, concerns or changes in the patient's clinical status.    Paul Jimenez MD, PGY-VI  Gastroenterology Fellow  [facility]

## 2024-11-29 NOTE — CONSULTS
Interventional Radiology  Consult/History & Physical Note    Inpatient consult to Interventional Radiology  Consult performed by: Duy Jay MD  Consult ordered by: Ambreen Spear DNP        SUBJECTIVE:     Chief Complaint:  right sided pleural collection    History of Present Illness:  Jose Hernández Sr. is a 69 y.o. male with a PMHx of chronic alcohol abuse admitted on 11/23/24 for concern of esophageal perforation after finding food particles within the plerural space at OSH. Recent imaging upon arrival concerning for recent perforated esophageal ulcer with loculated right sided pleural collection and perigastric collection. EGD completed on 11/26/24 with stent placed over esophageal perforation. Thoracic surgery consulted without plans for surgery at this time. Bilateral large bore chest tubes placed without significant output. Patient is currently intubated in the MICU at this time. Interventional radiology consulted for drain placement within the right pleural space and perigastric collection.       Review of Systems   Unable to perform ROS: Intubated       Scheduled Meds:   DAPTOmycin (CUBICIN) IV (PEDS and ADULTS)  8 mg/kg Intravenous Q24H    fat emulsion 20%  250 mL Intravenous Daily    heparin (porcine)  5,000 Units Subcutaneous Q8H    meropenem IV (PEDS and ADULTS)  2 g Intravenous Q12H    micafungin  100 mg Intravenous Q24H    pantoprazole  40 mg Intravenous BID     Continuous Infusions:   D5W   Intravenous Continuous        NORepinephrine bitartrate-D5W  0-3 mcg/kg/min Intravenous Continuous        propofoL  0-50 mcg/kg/min Intravenous Continuous 23.3 mL/hr at 11/29/24 0801 50 mcg/kg/min at 11/29/24 0801    TPN ADULT CENTRAL LINE CUSTOM   Intravenous Continuous 45 mL/hr at 11/29/24 0801 Rate Verify at 11/29/24 0801     PRN Meds:  Current Facility-Administered Medications:     fentaNYL, 50 mcg, Intravenous, Q1H PRN    HYDROmorphone, 0.5 mg,  Intravenous, Q6H PRN    iohexol, 15 mL, Oral, PRN    sodium chloride 0.9%, 10 mL, Intravenous, PRN    Review of patient's allergies indicates:  No Known Allergies    Past Medical History:   Diagnosis Date    Arthritis     Gout     Hyperlipemia     Hypertension     Stroke 2012     Past Surgical History:   Procedure Laterality Date    ESOPHAGOGASTRODUODENOSCOPY N/A 11/25/2024    Procedure: EGD (ESOPHAGOGASTRODUODENOSCOPY);  Surgeon: Som Balbuena MD;  Location: [address]);  Service: Endoscopy;  Laterality: N/A;    HAND SURGERY      SKIN GRAFT       Family History   Problem Relation Name Age of Onset    No Known Problems Mother      Hypertension Father       Social History     Tobacco Use    Smoking status: Never    Smokeless tobacco: Never   Substance Use Topics    Alcohol use: Yes    Drug use: Yes     Types: Marijuana       OBJECTIVE:     Vital Signs (Most Recent)  Temp: 98 °F (36.7 °C) (11/29/24 0701)  Pulse: 89 (11/29/24 0801)  Resp: (!) 34 (11/29/24 0842)  BP: 99/68 (11/28/24 0400)  SpO2: (!) 92 % (11/29/24 0801)    Physical Exam:  Physical Exam  Vitals and nursing note reviewed.   Constitutional:       Appearance: He is ill-appearing.   Eyes:      General: No scleral icterus.     Pupils: Pupils are equal, round, and reactive to light.   Cardiovascular:      Rate and Rhythm: Normal rate and regular rhythm.   Pulmonary:      Comments: intubated  Abdominal:      General: Abdomen is flat. There is no distension.      Palpations: Abdomen is soft.   Musculoskeletal:         General: No swelling.   Skin:     General: Skin is warm and dry.      Capillary Refill: Capillary refill takes less than 2 seconds.   Neurological:      Comments: sedated         Laboratory  I have reviewed all pertinent lab results within the past 24 hours.    ASA/Mallampati  ASA: 3  Mallampati: airway in place    Imaging:  Recent imaging studies including CT C/A/P on 11/27/24 which was independently reviewed by myself.      ASSESSMENT/PLAN:     Assessment:  69M with a PMHx of chronic alcohol abuse admitted on 11/23/24 for concern of esophageal perforation after finding food particles within the plerural space at OSH. Recent imaging upon arrival concerning for recent perforated esophageal ulcer with loculated right sided pleural collection and perigastric collection. EGD completed on 11/26/24 with stent placed over esophageal perforation. Thoracic surgery consulted without plans for surgery at this time. Bilateral large bore chest tubes placed without significant output. Patient is currently intubated in the MICU at this time. Interventional radiology consulted for drain placement within the right pleural space and perigastric collection.     Plan:  Will proceed with image guided percutaneous drain placement/aspiration of right sided pleural collection under moderate sedation. No safe window for drain placement of perigastric collection.   Recommend AES consult for evaluation of endoscopic drainage of perigastric collection.   Please keep pt NPO.  Primary team is responsible for ordering any labs/cultures to be drawn on fluid sample collected during the procedure.  Anticoagulation history reviewed. Labs reviewed.   If starting prophylactic AC following procedure, ok to start lovenox 12 hours following procedure and ok to start SQ heparin 6-8 hours following procedure per SIR guidelines    Duy Jay MD, PGY2  Interventional Radiology

## 2024-11-29 NOTE — NURSING
Pt arrived to [facility] for pleural chest tube placement. Pt from ICU, intubated and sedated on Propofol Comfort measures utilized. Pt safely transferred from stretcher to procedural table.  fall risk interventions maintained with direct observation/attendance. Safety strap applied, positioner pillows utilized to minimize pressure points. Blankets applied. Pt prepped and draped utilizing standard sterile technique. Patient placed on continuous monitoring, as required by sedation policy. Timeouts completed utilizing standard universal time-out, per department and facility policy. RN to remain at bedside, continuous monitoring maintained. Pt resting comfortably. Denies pain/discomfort. Will continue to monitor. See flow sheets for monitoring, medication administration, and updates.   ICU RN to remain at bedside as well

## 2024-11-29 NOTE — SUBJECTIVE & OBJECTIVE
Interval History: intubated, sedated.     Review of Systems   Reason unable to perform ROS: sedation.     Objective:     Vital Signs (Most Recent):  Temp: 98.5 °F (36.9 °C) (11/29/24 1101)  Pulse: 91 (11/29/24 1201)  Resp: (!) 27 (11/29/24 1201)  BP: 99/68 (11/28/24 0400)  SpO2: 95 % (11/29/24 1201) Vital Signs (24h Range):  Temp:  [98 °F (36.7 °C)-100 °F (37.8 °C)] 98.5 °F (36.9 °C)  Pulse:  [] 91  Resp:  [19-42] 27  SpO2:  [88 %-98 %] 95 %  Arterial Line BP: (100-135)/(47-68) 105/56     Weight: 77.6 kg (171 lb 1.2 oz)  Body mass index is 27.61 kg/m².    Estimated Creatinine Clearance: 32.5 mL/min (A) (based on SCr of 2.1 mg/dL (H)).     Physical Exam  Vitals and nursing note reviewed.   Constitutional:       Appearance: He is ill-appearing.   HENT:      Head: Normocephalic.      Mouth/Throat:      Comments: ETT  Pulmonary:      Comments: Mechanically ventilated, bilateral chest tubes  Abdominal:      Palpations: Abdomen is soft.      Comments: Bilateral KENNY drains   Skin:     Findings: Lesion present. No rash.          Significant Labs:   Microbiology Results (last 7 days)       Procedure Component Value Units Date/Time    Aerobic culture [[id number]]     Order Status: No result Specimen: Pleural Fluid     Culture, Anaerobic [[id number]]     Order Status: No result Specimen: Pleural Fluid     Gram stain [[id number]]     Order Status: No result Specimen: Pleural Fluid     Fungus culture [[id number]]     Order Status: No result Specimen: Pleural Fluid     Blood culture [[id number]] Collected: 11/24/24 0840    Order Status: Completed Specimen: Blood Updated: 11/29/24 1012     Blood Culture, Routine No growth after 5 days.    Blood culture [[id number]] Collected: 11/24/24 1635    Order Status: Completed Specimen: Blood from Peripheral, Upper Arm, Left Updated: 11/28/24 1812     Blood Culture, Routine No Growth to date      No Growth to date      No Growth to date      No Growth to date      No Growth to date     Culture, Respiratory with Gram Stain [[id number]]     Order Status: No result Specimen: Respiratory     Clostridium difficile EIA [[id number]]     Order Status: Canceled Specimen: Stool             Significant Imaging: I have reviewed all pertinent imaging results/findings within the past 24 hours.

## 2024-11-29 NOTE — ASSESSMENT & PLAN NOTE
Jose Hernández is a 69 year old man with alcohol use disorder, CVA, cdiff (2021) and hypertension transferred from [facility] for esophageal perforation. Found to have pneumoperitoneum/pneumomediastinum on imaging concerning for perforated viscus and loculated pleural effusion s/p robotic lap on 11/10. Had gangrenous thoracic esophagus/hiatal hernia, esophageal ulcer perforation that was repaired with omental patch (op note significant large intraabdominal/mediastinal pus with food stuffs/pills removed from mediastinum, s/p EGD with reported no evidence of intra-esophageal leak, no OR culture sent. Prior hospital course notable for shock, respiratry failure s/p intubation now extubated. Repeat CT c/w esophageal leak. Labs notable for persistent leukocytosis. Blood cultures at [facility] and here are negative. Had esophageal stent placement 11/25 with plan for esophagram on 11/29. Respiratory status worsened requiring intubation on 11/27. Repeat CT chest 11/27 demonstrated that right chest tube is no longer in the R loculated fluid collection and persistence of perigastric fluid collection. Perigastric fluid collection not accessible for drainage.     Recommendations:  - continue daptomycin 8mg/kg q24 hours with weekly CK  - continue meropenem 2g q12 (renal dosing), and micafungin 100 mg q24 hours   - please obtain cultures from pleural fluid collection - aerobic, anaerobic, fungal  - obtain sputum culture

## 2024-11-29 NOTE — PROGRESS NOTES
[facility] - Medical [facility]  Infectious Disease  Progress Note    Patient Name: Jose Hernández Sr.  MRN: [id number]  Admission Date: 11/23/2024  Length of Stay: 6 days  Attending Physician: Allison Don MD  Primary Care Provider: Tong Cantrell MD    Isolation Status: No active isolations  Assessment/Plan:      GI  * Esophageal perforation  Jose Hernández is a 69 year old man with alcohol use disorder, CVA, cdiff (2021) and hypertension transferred from [facility] for esophageal perforation. Found to have pneumoperitoneum/pneumomediastinum on imaging concerning for perforated viscus and loculated pleural effusion s/p robotic lap on 11/10. Had gangrenous thoracic esophagus/hiatal hernia, esophageal ulcer perforation that was repaired with omental patch (op note significant large intraabdominal/mediastinal pus with food stuffs/pills removed from mediastinum, s/p EGD with reported no evidence of intra-esophageal leak, no OR culture sent. Prior hospital course notable for shock, respiratry failure s/p intubation now extubated. Repeat CT c/w esophageal leak. Labs notable for persistent leukocytosis. Blood cultures at [facility] [place] and here are negative. Had esophageal stent placement 11/25 with plan for esophagram on 11/29. Respiratory status worsened requiring intubation on 11/27. Repeat CT chest 11/27 demonstrated that right chest tube is no longer in the R loculated fluid collection and persistence of perigastric fluid collection. Perigastric fluid collection not accessible for drainage.     Recommendations:  - continue daptomycin 8mg/kg q24 hours with weekly CK  - continue meropenem 2g q12 (renal dosing), and micafungin 100 mg q24 hours   - please obtain cultures from pleural fluid collection - aerobic, anaerobic, fungal  - obtain sputum culture       Above discussed with primary team.     Critical care time: 40 minutes  I personally spent critical care time on the following: evaluating this patient's organ  dysfunction, development of treatment plan, discussing treatment plan with patient or surrogate and bedside caregivers, discussions with critical care service and/or consultants, evaluation of patient's response to treatment, physical examination of patient, ordering and review of treatments interventions, laboratory studies, and radiographic studies, re-evaluation of patient's condition. This critical care time did not overlap with that of any other provider of the same specialty or involve time for procedures. This patient has decreasing ventilator requirements, decreasing pressor requirements.They continue to be critically ill.       Anticipated Disposition: TBD    Thank you for your consult. I will follow-up with patient. Please contact us if you have any additional questions.    Candy Baxter MD  Infectious Disease  [facility] - Medical ICU    Subjective:     Principal Problem:Esophageal perforation    HPI: 68 yo male with alcohol use, CVA, prior cdiff 2021 and HTN admitted for HLOC for esophageal perforation. At OSH, pt was found to have pneumoperitoneum/pneumomediastinum on imaging c/f perforated viscus and loculated pleural effusion s/p robotic lap on 11/10, found to have gangrenous thoracic esophagus/hiatal hernia, esophageal ulcer perforation that was repaired with omental patch (op note significant large intraabdominal/mediastinal pus with food stuff/pills removed from mediastinum, s/p EGD with reported no evidence of intra-esophageal leak. Prior hospital course notable for shock, respiratry failure s/p intubation now extubated. Pt is currently on vancomycin, meropenem, and micafungin. CTS and GI consulted.          Interval History: intubated, sedated.     Review of Systems   Reason unable to perform ROS: sedation.     Objective:     Vital Signs (Most Recent):  Temp: 98.5 °F (36.9 °C) (11/29/24 1101)  Pulse: 91 (11/29/24 1201)  Resp: (!) 27 (11/29/24 1201)  BP: 99/68 (11/28/24 0400)  SpO2: 95 %  (11/29/24 1201) Vital Signs (24h Range):  Temp:  [98 °F (36.7 °C)-100 °F (37.8 °C)] 98.5 °F (36.9 °C)  Pulse:  [] 91  Resp:  [19-42] 27  SpO2:  [88 %-98 %] 95 %  Arterial Line BP: (100-135)/(47-68) 105/56     Weight: 77.6 kg (171 lb 1.2 oz)  Body mass index is 27.61 kg/m².    Estimated Creatinine Clearance: 32.5 mL/min (A) (based on SCr of 2.1 mg/dL (H)).     Physical Exam  Vitals and nursing note reviewed.   Constitutional:       Appearance: He is ill-appearing.   HENT:      Head: Normocephalic.      Mouth/Throat:      Comments: ETT  Pulmonary:      Comments: Mechanically ventilated, bilateral chest tubes  Abdominal:      Palpations: Abdomen is soft.      Comments: Bilateral KENNY drains   Skin:     Findings: Lesion present. No rash.          Significant Labs:   Microbiology Results (last 7 days)       Procedure Component Value Units Date/Time    Aerobic culture [[id number]]     Order Status: No result Specimen: Pleural Fluid     Culture, Anaerobic [[id number]]     Order Status: No result Specimen: Pleural Fluid     Gram stain [[id number]]     Order Status: No result Specimen: Pleural Fluid     Fungus culture [[id number]]     Order Status: No result Specimen: Pleural Fluid     Blood culture [[id number]] Collected: 11/24/24 0840    Order Status: Completed Specimen: Blood Updated: 11/29/24 1012     Blood Culture, Routine No growth after 5 days.    Blood culture [[id number]] Collected: 11/24/24 1635    Order Status: Completed Specimen: Blood from Peripheral, Upper Arm, Left Updated: 11/28/24 1812     Blood Culture, Routine No Growth to date      No Growth to date      No Growth to date      No Growth to date      No Growth to date    Culture, Respiratory with Gram Stain [[id number]]     Order Status: No result Specimen: Respiratory     Clostridium difficile EIA [[id number]]     Order Status: Canceled Specimen: Stool             Significant Imaging: I have reviewed all pertinent imaging results/findings within the  past 24 hours.

## 2024-11-29 NOTE — PLAN OF CARE
11/29/24 1603   Discharge Reassessment   Did the patient's condition or plan change since previous assessment? No   Communicated PHILLIP with patient/caregiver Date not available/Unable to determine   Discharge Plan A Long-term acute care facility (LTAC)   Discharge Plan B Rehab   Transition of Care Barriers None   Post-Acute Status   Discharge Delays None known at this time     Pt post procedure today. In anticipation of need, referrals sent to [facility]  Will discuss this option with family if facility criteria met.     Tamiko Mercado RN   11/29/2024

## 2024-11-30 NOTE — PROGRESS NOTES
[facility] - Medical ICU  Infectious Disease  Progress Note    Patient Name: Jose Hernández Sr.  MRN: [id number]  Admission Date: 11/23/2024  Length of Stay: 7 days  Attending Physician: Allison Dno MD  Primary Care Provider: Tong Cantrell MD    Isolation Status: No active isolations  Assessment/Plan:      GI  * Esophageal perforation  Jose Hernández is a 69 year old man with alcohol use disorder, CVA, cdiff (2021) and hypertension transferred from [facility] for esophageal perforation. Found to have pneumoperitoneum/pneumomediastinum on imaging concerning for perforated viscus and loculated pleural effusion s/p robotic lap on 11/10. Had gangrenous thoracic esophagus/hiatal hernia, esophageal ulcer perforation that was repaired with omental patch (op note significant large intraabdominal/mediastinal pus with food stuffs/pills removed from mediastinum, s/p EGD with reported no evidence of intra-esophageal leak, no OR culture sent. Prior hospital course notable for shock, respiratry failure s/p intubation now extubated. Repeat CT c/w esophageal leak. Labs notable for persistent leukocytosis. Blood cultures at [facility] [place] and here are negative. Had esophageal stent placement 11/25 with plan for esophagram on 11/29. Respiratory status worsened requiring intubation on 11/27. Repeat CT chest 11/27 demonstrated that right chest tube is no longer in the R loculated fluid collection and persistence of perigastric fluid collection. Perigastric fluid collection not accessible for drainage, but went for IR drainage of R sided pleural fluid collection.     Recommendations:  - continue daptomycin 8mg/kg q24 hours with weekly CK  - continue meropenem 2g q12 (renal dosing), and micafungin 100 mg q24 hours   - will follow pleural fluid and respiratory cultures       Above discussed with primary team.     Critical care time: 40 minutes  I personally spent critical care time on the following: evaluating this patient's  organ dysfunction, development of treatment plan, discussing treatment plan with patient or surrogate and bedside caregivers, discussions with critical care service and/or consultants, evaluation of patient's response to treatment, physical examination of patient, ordering and review of treatments interventions, laboratory studies, and radiographic studies, re-evaluation of patient's condition. This critical care time did not overlap with that of any other provider of the same specialty or involve time for procedures. This patient has decreasing ventilator requirements. They continue to be critically ill.     Anticipated Disposition: TBD    Thank you for your consult. I will follow-up with patient. Please contact us if you have any additional questions.    Candy Baxter MD  Infectious Disease  [facility] - Medical ICU    Subjective:     Principal Problem:Esophageal perforation    HPI: 68 yo male with alcohol use, CVA, prior cdiff 2021 and HTN admitted for HLOC for esophageal perforation. At OSH, pt was found to have pneumoperitoneum/pneumomediastinum on imaging c/f perforated viscus and loculated pleural effusion s/p robotic lap on 11/10, found to have gangrenous thoracic esophagus/hiatal hernia, esophageal ulcer perforation that was repaired with omental patch (op note significant large intraabdominal/mediastinal pus with food stuff/pills removed from mediastinum, s/p EGD with reported no evidence of intra-esophageal leak. Prior hospital course notable for shock, respiratry failure s/p intubation now extubated. Pt is currently on vancomycin, meropenem, and micafungin. CTS and GI consulted.          Interval History: went for IR drainage. Sedated. Family at bedside.     Review of Systems   Reason unable to perform ROS: sedation.     Objective:     Vital Signs (Most Recent):  Temp: 98 °F (36.7 °C) (11/30/24 0701)  Pulse: 92 (11/30/24 1120)  Resp: (!) 34 (11/30/24 1120)  BP: 114/65 (11/29/24 1540)  SpO2: 100 %  (11/30/24 1120) Vital Signs (24h Range):  Temp:  [97.6 °F (36.4 °C)-98.7 °F (37.1 °C)] 98 °F (36.7 °C)  Pulse:  [86-98] 92  Resp:  [22-53] 34  SpO2:  [93 %-100 %] 100 %  BP: (109-121)/(63-65) 114/65  Arterial Line BP: ()/() 107/58     Weight: 77.6 kg (171 lb 1.2 oz)  Body mass index is 27.61 kg/m².    Estimated Creatinine Clearance: 36 mL/min (A) (based on SCr of 1.9 mg/dL (H)).     Physical Exam  Vitals reviewed.   Constitutional:       Appearance: He is ill-appearing.   HENT:      Head: Normocephalic.      Nose: Nose normal.      Mouth/Throat:      Comments: ETT  Pulmonary:      Comments: Mechanically ventilated. Two right sided chest tubes, one left sided chest tube. Abdominal KENNY drains.   Abdominal:      General: There is no distension.      Palpations: Abdomen is soft.   Skin:     General: Skin is warm.      Findings: Lesion present.          Significant Labs:   Microbiology Results (last 7 days)       Procedure Component Value Units Date/Time    Culture, Anaerobic [[id number]] Collected: 11/29/24 1539    Order Status: Completed Specimen: Pleural Fluid Updated: 11/30/24 1328     Anaerobic Culture Culture in progress    Aerobic culture [[id number]] Collected: 11/29/24 1539    Order Status: Completed Specimen: Pleural Fluid Updated: 11/30/24 0618     Aerobic Bacterial Culture No growth    Gram stain [[id number]] Collected: 11/29/24 1539    Order Status: Completed Specimen: Pleural Fluid Updated: 11/29/24 2310     Gram Stain Result Many WBC's      No organisms seen    Blood culture [[id number]] Collected: 11/24/24 1635    Order Status: Completed Specimen: Blood from Peripheral, Upper Arm, Left Updated: 11/29/24 1812     Blood Culture, Routine No growth after 5 days.    Fungus culture [[id number]] Collected: 11/29/24 1539    Order Status: Sent Specimen: Pleural Fluid Updated: 11/29/24 1717    Blood culture [[id number]] Collected: 11/24/24 0840    Order Status: Completed Specimen: Blood Updated: 11/29/24  1012     Blood Culture, Routine No growth after 5 days.    Culture, Respiratory with Gram Stain [[id number]]     Order Status: No result Specimen: Respiratory     Clostridium difficile EIA [[id number]]     Order Status: Canceled Specimen: Stool             Significant Imaging: I have reviewed all pertinent imaging results/findings within the past 24 hours.

## 2024-11-30 NOTE — SUBJECTIVE & OBJECTIVE
Interval History: went for IR drainage. Sedated. Family at bedside.     Review of Systems   Reason unable to perform ROS: sedation.     Objective:     Vital Signs (Most Recent):  Temp: 98 °F (36.7 °C) (11/30/24 0701)  Pulse: 92 (11/30/24 1120)  Resp: (!) 34 (11/30/24 1120)  BP: 114/65 (11/29/24 1540)  SpO2: 100 % (11/30/24 1120) Vital Signs (24h Range):  Temp:  [97.6 °F (36.4 °C)-98.7 °F (37.1 °C)] 98 °F (36.7 °C)  Pulse:  [86-98] 92  Resp:  [22-53] 34  SpO2:  [93 %-100 %] 100 %  BP: (109-121)/(63-65) 114/65  Arterial Line BP: ()/() 107/58     Weight: 77.6 kg (171 lb 1.2 oz)  Body mass index is 27.61 kg/m².    Estimated Creatinine Clearance: 36 mL/min (A) (based on SCr of 1.9 mg/dL (H)).     Physical Exam  Vitals reviewed.   Constitutional:       Appearance: He is ill-appearing.   HENT:      Head: Normocephalic.      Nose: Nose normal.      Mouth/Throat:      Comments: ETT  Pulmonary:      Comments: Mechanically ventilated. Two right sided chest tubes, one left sided chest tube. Abdominal KENNY drains.   Abdominal:      General: There is no distension.      Palpations: Abdomen is soft.   Skin:     General: Skin is warm.      Findings: Lesion present.          Significant Labs:   Microbiology Results (last 7 days)       Procedure Component Value Units Date/Time    Culture, Anaerobic [[id number]] Collected: 11/29/24 1539    Order Status: Completed Specimen: Pleural Fluid Updated: 11/30/24 1328     Anaerobic Culture Culture in progress    Aerobic culture [[id number]] Collected: 11/29/24 1539    Order Status: Completed Specimen: Pleural Fluid Updated: 11/30/24 0618     Aerobic Bacterial Culture No growth    Gram stain [[id number]] Collected: 11/29/24 1539    Order Status: Completed Specimen: Pleural Fluid Updated: 11/29/24 2310     Gram Stain Result Many WBC's      No organisms seen    Blood culture [[id number]] Collected: 11/24/24 1635    Order Status: Completed Specimen: Blood from Peripheral, Upper Arm, Left  Updated: 11/29/24 1812     Blood Culture, Routine No growth after 5 days.    Fungus culture [[id number]] Collected: 11/29/24 1539    Order Status: Sent Specimen: Pleural Fluid Updated: 11/29/24 1717    Blood culture [[id number]] Collected: 11/24/24 0840    Order Status: Completed Specimen: Blood Updated: 11/29/24 1012     Blood Culture, Routine No growth after 5 days.    Culture, Respiratory with Gram Stain [[id number]]     Order Status: No result Specimen: Respiratory     Clostridium difficile EIA [[id number]]     Order Status: Canceled Specimen: Stool             Significant Imaging: I have reviewed all pertinent imaging results/findings within the past 24 hours.

## 2024-11-30 NOTE — ASSESSMENT & PLAN NOTE
Jose Hernández is a 69 year old man with alcohol use disorder, CVA, cdiff (2021) and hypertension transferred from [facility] for esophageal perforation. Found to have pneumoperitoneum/pneumomediastinum on imaging concerning for perforated viscus and loculated pleural effusion s/p robotic lap on 11/10. Had gangrenous thoracic esophagus/hiatal hernia, esophageal ulcer perforation that was repaired with omental patch (op note significant large intraabdominal/mediastinal pus with food stuffs/pills removed from mediastinum, s/p EGD with reported no evidence of intra-esophageal leak, no OR culture sent. Prior hospital course notable for shock, respiratry failure s/p intubation now extubated. Repeat CT c/w esophageal leak. Labs notable for persistent leukocytosis. Blood cultures at [facility] and here are negative. Had esophageal stent placement 11/25 with plan for esophagram on 11/29. Respiratory status worsened requiring intubation on 11/27. Repeat CT chest 11/27 demonstrated that right chest tube is no longer in the R loculated fluid collection and persistence of perigastric fluid collection. Perigastric fluid collection not accessible for drainage, but went for IR drainage of R sided pleural fluid collection.     Recommendations:  - continue daptomycin 8mg/kg q24 hours with weekly CK  - continue meropenem 2g q12 (renal dosing), and micafungin 100 mg q24 hours   - will follow pleural fluid and respiratory cultures

## 2024-11-30 NOTE — SUBJECTIVE & OBJECTIVE
Interval History/Significant Events: CT chest and abdomen with contrast completed overnight without evidence of esophageal leak.    Review of Systems   Unable to perform ROS: Intubated     Objective:     Vital Signs (Most Recent):  Temp: 98 °F (36.7 °C) (11/30/24 0701)  Pulse: 92 (11/30/24 1120)  Resp: (!) 34 (11/30/24 1120)  BP: 114/65 (11/29/24 1540)  SpO2: 100 % (11/30/24 1120) Vital Signs (24h Range):  Temp:  [97.6 °F (36.4 °C)-98.7 °F (37.1 °C)] 98 °F (36.7 °C)  Pulse:  [86-98] 92  Resp:  [0-53] 34  SpO2:  [93 %-100 %] 100 %  BP: (109-121)/(63-65) 114/65  Arterial Line BP: ()/() 107/58   Weight: 77.6 kg (171 lb 1.2 oz)  Body mass index is 27.61 kg/m².      Intake/Output Summary (Last 24 hours) at 11/30/2024 1300  Last data filed at 11/30/2024 0701  Gross per 24 hour   Intake 857.58 ml   Output 2397 ml   Net -1539.42 ml          Physical Exam  Constitutional:       Appearance: He is ill-appearing.   Eyes:      Pupils: Pupils are equal, round, and reactive to light.   Cardiovascular:      Rate and Rhythm: Normal rate and regular rhythm.      Pulses: Normal pulses.      Heart sounds: Normal heart sounds.   Pulmonary:      Breath sounds: Wheezing present.      Comments: Intubated on mechanical ventilation  Chest tubes x3 to suction.  Abdominal:      General: Bowel sounds are normal.      Palpations: Abdomen is soft.      Comments: KENNY drain x 2   Genitourinary:     Comments: Johansen catheter with concenetrated yellow urine  Musculoskeletal:         General: Normal range of motion.   Skin:     General: Skin is warm and dry.      Coloration: Skin is pale.   Neurological:      Comments: Sedated            Vents:  Vent Mode: A/C (11/30/24 1120)  Ventilator Initiated: Yes (11/27/24 1522)  Set Rate: 18 BPM (11/30/24 1120)  Vt Set: 385 mL (11/27/24 1522)  PEEP/CPAP: 5 cmH20 (11/30/24 1120)  Oxygen Concentration (%): 40 (11/30/24 1120)  Peak Airway Pressure: 27 cmH20 (11/30/24 1120)  Plateau Pressure: 24 cmH20  (11/30/24 1120)  Total Ve: 12.4 L/m (11/30/24 1120)  F/VT Ratio<105 (RSBI): (!) 84.79 (11/30/24 1120)  Lines/Drains/Airways       Central Venous Catheter Line  Duration             Percutaneous Central Line - Triple Lumen    Internal Jugular Right -- days              Drain  Duration                  Chest Tube   Left Midaxillary -- days         Chest Tube   Right Midaxillary -- days         Closed/Suction Drain   Right Abdomen Bulb -- days         Closed/Suction Drain  Left Abdomen Bulb -- days         Urethral Catheter 11/23/24 1313 Temperature probe 6 days         Chest Tube 11/29/24 1534 Tube - 3 Right Midaxillary;Pleural 14 Fr. <1 day              Airway  Duration                  Airway - Non-Surgical 11/27/24 1520 Endotracheal Tube 2 days              Arterial Line  Duration             Arterial Line 11/27/24 1810 Left Radial 2 days              Peripheral Intravenous Line  Duration                  Peripheral IV - Single Lumen 11/23/24 0901 18 G 1 3/4 in No Left Antecubital 7 days                  Significant Labs:    CBC/Anemia Profile:  Recent Labs   Lab 11/29/24  0310 11/29/24  2023 11/30/24  0304   WBC 23.47*  --  20.65*   HGB 7.7*  --  7.4*   HCT 23.6* 24* 23.0*     --  258   MCV 81*  --  82   RDW 22.3*  --  22.3*        Chemistries:  Recent Labs   Lab 11/29/24  0310 11/29/24  1210 11/29/24  1710 11/29/24  2353 11/30/24  0304   *   < > 146* 142 142   K 3.2*   < > 3.6 4.1 3.7      < > 108 108 106   CO2 29   < > 26 26 25   BUN 66*   < > 65* 64* 64*   CREATININE 2.2*   < > 2.1* 2.1* 2.0*   CALCIUM 8.6*   < > 8.9 8.9 8.9   ALBUMIN 1.3*  --   --   --  1.1*   PROT 6.8  --   --   --  6.7   BILITOT 2.6*  --   --   --  2.3*   ALKPHOS 156*  --   --   --  162*   ALT 28  --   --   --  28   AST 68*  --   --   --  61*   MG 2.0  --   --   --  2.0   PHOS 4.0  --   --   --  4.0    < > = values in this interval not displayed.       All pertinent labs within the past 24 hours have been  reviewed.    Significant Imaging:  I have reviewed all pertinent imaging results/findings within the past 24 hours.

## 2024-11-30 NOTE — PLAN OF CARE
MICU DAILY GOALS     Family/Goals of care/Code Status   Code Status: Full Code    24H Vital Sign Range  Temp:  [97.6 °F (36.4 °C)-98.7 °F (37.1 °C)]   Pulse:  [84-98]   Resp:  [0-53]   BP: (109-121)/(63-65)   SpO2:  [92 %-100 %]   Arterial Line BP: ()/()      Shift Events (include procedures and significant events)   No acute events throughout shift.    AWAKE RASS: Goal - RASS Goal: -2-->light sedation  Actual - RASS ([place] Agitation-Sedation Scale): moderate sedation    Restraint necessity: Not necessary   BREATHE SBT: Not attempted    Coordinate A & B, analgesics/sedatives Pain: managed   SAT: Not attempted   Delirium CAM-ICU: Overall CAM-ICU: Positive   Early(intubated/ Progressive (non-intubated) Mobility MOVE Screen (INTUBATED ONLY): Not attempted    Activity: Activity Management: Rolling - L1   Feeding/Nutrition Diet order: Diet/Nutrition Received: NPO,     Thrombus DVT prophylaxis: VTE Core Measure: Pharmacological prophylaxis initiated/maintained   HOB Elevation Head of Bed (HOB) Positioning: HOB at 30-45 degrees   Ulcer Prophylaxis GI: yes   Glucose control managed Glycemic Management: blood glucose monitored   Skin Skin assessment:     Sacrum intact/not altered? Yes  Heels intact/not altered? Yes  Surgical wound? Yes    CHECK ONE!   (no altered skin or altered skin) and sub boxes:  [] No Altered Skin Integrity Present    []Prevention Measures Documented    [x] Altered Skin Integrity Present or Discovered   [] LDA present in EPIC, daily doc completed              [] LDA added if not in EPIC (describe wound).                    When describing wound, do not stage, use descriptive words only.    [x] Wound Image Taken (required on admit,                   transfer/discharge and every Tuesday)    Wound Care Consulted? No   Bowel Function diarrhea    Indwelling Catheter Necessity      Urethral Catheter 11/23/24 1313 Temperature probe-Reason for Continuing Urinary Catheterization: Critically ill in  ICU and requiring hourly monitoring of intake/output  [REMOVED]      Urethral Catheter  -Reason for Continuing Urinary Catheterization: Critically ill in ICU and requiring hourly monitoring of intake/output    Percutaneous Central Line - Triple Lumen    Internal Jugular Right-Line Necessity Review: Hemodynamic instability     De-escalation Antibiotics Yes        VS and assessment per flow sheet, patient progressing towards goals as tolerated, plan of care reviewed with  no learner available , all concerns addressed, will continue to monitor.

## 2024-11-30 NOTE — ASSESSMENT & PLAN NOTE
69 yoM s/p primary repair of esophageal perforation at [facility] on 11/10. Transferred for concern of ongoing leak. No access to outside imaging at this time. Overall clinically stable in the MICU. Not on pressors. Minimal respiratory support. Case discussed with Dr. Goldberg and reviewed with family. Patient is quite frail with poor cardiac function and would not tolerate surgical intervention well, therefore minimally invasive procedures would be preferable.    - underwent stent with AES, large defect near GEJ  - unfortunately, there are few surgical options for this patient as he has already failed attempted surgical repair  - CT shows no enlarging fluid accumulations with drains in appropriate position. And there is no obvious large leak on CT with contrast through NGT 11/30/2024   - Care per MICU  - Thoracic surgery will follow

## 2024-11-30 NOTE — PROGRESS NOTES
[facility] - Medical ICU  Thoracic Surgery  Progress Note    Subjective:     History of Present Illness:  Mr. Hernández is a 69 yoM with PMH significant for chronic alcohol abuse and NSAID use who suffered a perforated esophageal ulcer and underwent promary repair with bilateral chest tube placement and bilateral intraabdominal drain placement on 11/10 at an [facility]. He was transferred here for concern of persistent leak and potential AES, IR, and CTS intervention. Of note, his EF was found to be 20% on [facility] echo. He was transferred to the MICU.     Post-Op Info:  Procedure(s) (LRB):  EGD (ESOPHAGOGASTRODUODENOSCOPY) (N/A)   5 Days Post-Op     Interval History:  No acute events overnight.  Underwent IR chest tube placement and CT with oral contrast through the NG tube.  Does not have an obvious large leak upon review with myself and staff.  Chest tubes in place bilaterally with serosanguineous output.  WBC down to 20 from 20/3.  Remains on minimal ventilator settings, on 0.15 of levo    Medications:  Continuous Infusions:   D5W   Intravenous Continuous 50 mL/hr at 11/30/24 0548 New Bag at 11/30/24 0548    fentanyl  0-250 mcg/hr Intravenous Continuous 5 mL/hr at 11/29/24 1947 50 mcg/hr at 11/29/24 1947    NORepinephrine bitartrate-D5W  0-3 mcg/kg/min Intravenous Continuous 21.8 mL/hr at 11/30/24 0410 0.15 mcg/kg/min at 11/30/24 0410    propofoL  0-50 mcg/kg/min Intravenous Continuous 23.3 mL/hr at 11/30/24 0523 50 mcg/kg/min at 11/30/24 0523    TPN ADULT CENTRAL LINE CUSTOM   Intravenous Continuous 45 mL/hr at 11/29/24 2104 New Bag at 11/29/24 2104     Scheduled Meds:   DAPTOmycin (CUBICIN) IV (PEDS and ADULTS)  8 mg/kg Intravenous Q24H    heparin (porcine)  5,000 Units Subcutaneous Q8H    meropenem IV (PEDS and ADULTS)  2 g Intravenous Q12H    micafungin  100 mg Intravenous Q24H    mupirocin   Nasal BID    pantoprazole  40 mg Intravenous BID     PRN Meds:  Current Facility-Administered Medications:     fentanyl, 50 mcg,  Intravenous, Q30 Min PRN    HYDROmorphone, 0.5 mg, Intravenous, Q6H PRN    iohexol, 15 mL, Oral, PRN    sodium chloride 0.9%, 10 mL, Intravenous, PRN     Review of patient's allergies indicates:  No Known Allergies  Objective:     Vital Signs (Most Recent):  Temp: 98 °F (36.7 °C) (11/30/24 0701)  Pulse: 90 (11/30/24 0800)  Resp: (!) 37 (11/30/24 0800)  BP: 114/65 (11/29/24 1540)  SpO2: 100 % (11/30/24 0800) Vital Signs (24h Range):  Temp:  [97.6 °F (36.4 °C)-98.7 °F (37.1 °C)] 98 °F (36.7 °C)  Pulse:  [86-98] 90  Resp:  [0-53] 37  SpO2:  [93 %-100 %] 100 %  BP: (109-121)/(63-65) 114/65  Arterial Line BP: ()/() 107/58     Intake/Output - Last 3 Shifts         11/28 0700  11/29 0659 11/29 0700 11/30 0659 11/30 0700  12/01 0659    P.O. 0 0     I.V. (mL/kg) 1081.2 (13.9) 890.2 (11.5)     IV Piggyback 469.9 370.2     TPN 1327.3 607.7     Total Intake(mL/kg) 2878.5 (37.1) 1868.1 (24.1)     Urine (mL/kg/hr) 1825 (1) 2725 (1.5) 200 (1)    Drains 33 28     Other  125     Stool 0      Chest Tube 112 69     Total Output 1970 2947 200    Net +908.5 -1079 -200           Stool Occurrence 1 x              SpO2: 100 %        Physical Exam  Vitals reviewed.   HENT:      Head: Normocephalic.      Mouth/Throat:      Comments: ETT  Pulmonary:      Comments: Mechanically ventilated. Bilateral chest tubes, two on right and one on left with ss output.   Abdominal:      General: There is no distension.      Palpations: Abdomen is soft.      Comments: Bilateral KENNY drains with serosanguinous output   Genitourinary:     Comments: douglass  Skin:     General: Skin is warm.      Findings: No rash.            Significant Labs:  CBC:   Recent Labs   Lab 11/30/24  0304   WBC 20.65*   RBC 2.80*   HGB 7.4*   HCT 23.0*      MCV 82   MCH 26.4*   MCHC 32.2     CMP:   Recent Labs   Lab 11/30/24  0304   *   CALCIUM 8.9   ALBUMIN 1.1*   PROT 6.7      K 3.7   CO2 25      BUN 64*   CREATININE 2.0*   ALKPHOS 162*   ALT 28    AST 61*   BILITOT 2.3*       Significant Diagnostics:  I have reviewed all pertinent imaging results/findings within the past 24 hours.    VTE Risk Mitigation (From admission, onward)           Ordered     heparin (porcine) injection 5,000 Units  Every 8 hours         11/26/24 0851                  Assessment/Plan:     * Esophageal perforation  69 yoM s/p primary repair of esophageal perforation at [facility] on 11/10. Transferred for concern of ongoing leak. No access to outside imaging at this time. Overall clinically stable in the MICU. Not on pressors. Minimal respiratory support. Case discussed with Dr. Goldberg and reviewed with family. Patient is quite frail with poor cardiac function and would not tolerate surgical intervention well, therefore minimally invasive procedures would be preferable.    - underwent stent with AES, large defect near GEJ  - unfortunately, there are few surgical options for this patient as he has already failed attempted surgical repair  - CT shows no enlarging fluid accumulations with drains in appropriate position. And there is no obvious large leak on CT with contrast through NGT 11/30/2024   - Care per MICU  - Thoracic surgery will follow        James Mendez MD  Thoracic Surgery  [facility] - Medical ICU

## 2024-11-30 NOTE — NURSING
MICU DAILY GOALS     Family/Goals of care/Code Status   Code Status: Full Code    24H Vital Sign Range  Temp:  [97.6 °F (36.4 °C)-99.4 °F (37.4 °C)]   Pulse:  [86-98]   Resp:  [22-41]   SpO2:  [94 %-100 %]   Arterial Line BP: ()/()      Shift Events (include procedures and significant events)   Oxygen requirements increased from 40% to 60%. Remains on levo at .16,     AWAKE RASS: Goal - RASS Goal: -2-->light sedation  Actual - RASS ([place] Agitation-Sedation Scale): restless    Restraint necessity: {MICU Restraints:87130}   BREATHE SBT: {SBT pass/fail:80455}    Coordinate A & B, analgesics/sedatives Pain: {Managed/ Uncontrolled:87966}   SAT: {SBT pass/fail:13060}   Delirium CAM-ICU: Overall CAM-ICU: Positive   Early(intubated/ Progressive (non-intubated) Mobility MOVE Screen (INTUBATED ONLY): {SBT pass/fail:92980}    Activity: Activity Management: Rolling - L1   Feeding/Nutrition Diet order: Diet/Nutrition Received: NPO,     Thrombus DVT prophylaxis: VTE Core Measure: Pharmacological prophylaxis initiated/maintained   HOB Elevation Head of Bed (HOB) Positioning: HOB elevated, HOB at 30 degrees   Ulcer Prophylaxis GI: {YES/NO:18758}   Glucose control {Managed/ Uncontrolled:22445} Glycemic Management: blood glucose monitored   Skin Skin assessment:     Sacrum intact/not altered? {YES/NO:86033}  Heels intact/not altered? {YES/NO:89620}  Surgical wound? {YES/NO:63740}    CHECK ONE!   (no altered skin or altered skin) and sub boxes:  [] No Altered Skin Integrity Present    []Prevention Measures Documented    [] Altered Skin Integrity Present or Discovered   [] LDA present in EPIC, daily doc completed              [] LDA added if not in EPIC (describe wound).                    When describing wound, do not stage, use descriptive words only.    [] Wound Image Taken (required on admit,                   transfer/discharge and every Tuesday)    Wound Care Consulted? {YES/NO:95504}   Bowel Function  {bowel:49550}    Indwelling Catheter Necessity      Urethral Catheter 11/23/24 1313 Temperature probe-Reason for Continuing Urinary Catheterization: Critically ill in ICU and requiring hourly monitoring of intake/output  [REMOVED]      Urethral Catheter  -Reason for Continuing Urinary Catheterization: Critically ill in ICU and requiring hourly monitoring of intake/output    Percutaneous Central Line - Triple Lumen    Internal Jugular Right-Line Necessity Review: Hemodynamic instability  ***   De-escalation Antibiotics {YES:98596}        VS and assessment per flow sheet, patient progressing towards goals as tolerated, plan of care reviewed with {POC Review:96187}, all concerns addressed, will continue to monitor.

## 2024-11-30 NOTE — SUBJECTIVE & OBJECTIVE
Interval History:  No acute events overnight.  Underwent IR chest tube placement and CT with oral contrast through the NG tube.  Does not have an obvious large leak upon review with myself and staff.  Chest tubes in place bilaterally with serosanguineous output.  WBC down to 20 from 20/3.  Remains on minimal ventilator settings, on 0.15 of levo    Medications:  Continuous Infusions:   D5W   Intravenous Continuous 50 mL/hr at 11/30/24 0548 New Bag at 11/30/24 0548    fentanyl  0-250 mcg/hr Intravenous Continuous 5 mL/hr at 11/29/24 1947 50 mcg/hr at 11/29/24 1947    NORepinephrine bitartrate-D5W  0-3 mcg/kg/min Intravenous Continuous 21.8 mL/hr at 11/30/24 0410 0.15 mcg/kg/min at 11/30/24 0410    propofoL  0-50 mcg/kg/min Intravenous Continuous 23.3 mL/hr at 11/30/24 0523 50 mcg/kg/min at 11/30/24 0523    TPN ADULT CENTRAL LINE CUSTOM   Intravenous Continuous 45 mL/hr at 11/29/24 2104 New Bag at 11/29/24 2104     Scheduled Meds:   DAPTOmycin (CUBICIN) IV (PEDS and ADULTS)  8 mg/kg Intravenous Q24H    heparin (porcine)  5,000 Units Subcutaneous Q8H    meropenem IV (PEDS and ADULTS)  2 g Intravenous Q12H    micafungin  100 mg Intravenous Q24H    mupirocin   Nasal BID    pantoprazole  40 mg Intravenous BID     PRN Meds:  Current Facility-Administered Medications:     fentanyl, 50 mcg, Intravenous, Q30 Min PRN    HYDROmorphone, 0.5 mg, Intravenous, Q6H PRN    iohexol, 15 mL, Oral, PRN    sodium chloride 0.9%, 10 mL, Intravenous, PRN     Review of patient's allergies indicates:  No Known Allergies  Objective:     Vital Signs (Most Recent):  Temp: 98 °F (36.7 °C) (11/30/24 0701)  Pulse: 90 (11/30/24 0800)  Resp: (!) 37 (11/30/24 0800)  BP: 114/65 (11/29/24 1540)  SpO2: 100 % (11/30/24 0800) Vital Signs (24h Range):  Temp:  [97.6 °F (36.4 °C)-98.7 °F (37.1 °C)] 98 °F (36.7 °C)  Pulse:  [86-98] 90  Resp:  [0-53] 37  SpO2:  [93 %-100 %] 100 %  BP: (109-121)/(63-65) 114/65  Arterial Line BP: ()/() 107/58      Intake/Output - Last 3 Shifts         11/28 0700 11/29 0659 11/29 0700 11/30 0659 11/30 0700 12/01 0659    P.O. 0 0     I.V. (mL/kg) 1081.2 (13.9) 890.2 (11.5)     IV Piggyback 469.9 370.2     TPN 1327.3 607.7     Total Intake(mL/kg) 2878.5 (37.1) 1868.1 (24.1)     Urine (mL/kg/hr) 1825 (1) 2725 (1.5) 200 (1)    Drains 33 28     Other  125     Stool 0      Chest Tube 112 69     Total Output 1970 2947 200    Net +908.5 -1079 -200           Stool Occurrence 1 x              SpO2: 100 %        Physical Exam  Vitals reviewed.   HENT:      Head: Normocephalic.      Mouth/Throat:      Comments: ETT  Pulmonary:      Comments: Mechanically ventilated. Bilateral chest tubes, two on right and one on left with ss output.   Abdominal:      General: There is no distension.      Palpations: Abdomen is soft.      Comments: Bilateral KENNY drains with serosanguinous output   Genitourinary:     Comments: douglass  Skin:     General: Skin is warm.      Findings: No rash.            Significant Labs:  CBC:   Recent Labs   Lab 11/30/24  0304   WBC 20.65*   RBC 2.80*   HGB 7.4*   HCT 23.0*      MCV 82   MCH 26.4*   MCHC 32.2     CMP:   Recent Labs   Lab 11/30/24  0304   *   CALCIUM 8.9   ALBUMIN 1.1*   PROT 6.7      K 3.7   CO2 25      BUN 64*   CREATININE 2.0*   ALKPHOS 162*   ALT 28   AST 61*   BILITOT 2.3*       Significant Diagnostics:  I have reviewed all pertinent imaging results/findings within the past 24 hours.    VTE Risk Mitigation (From admission, onward)           Ordered     heparin (porcine) injection 5,000 Units  Every 8 hours         11/26/24 0852

## 2024-12-01 PROBLEM — E87.5 HYPERKALEMIA: Status: ACTIVE | Noted: 2024-01-01

## 2024-12-01 PROBLEM — Z78.9 POOR PROGNOSIS: Status: ACTIVE | Noted: 2024-01-01

## 2024-12-01 NOTE — ASSESSMENT & PLAN NOTE
Jose Hernández is a 69 year old man with alcohol use disorder, CVA, cdiff (2021) and hypertension transferred from [facility] for esophageal perforation. Found to have pneumoperitoneum/pneumomediastinum on imaging concerning for perforated viscus and loculated pleural effusion s/p robotic lap on 11/10. Had gangrenous thoracic esophagus/hiatal hernia, esophageal ulcer perforation that was repaired with omental patch (op note significant large intraabdominal/mediastinal pus with food stuffs/pills removed from mediastinum, s/p EGD with reported no evidence of intra-esophageal leak, no OR culture sent. Prior hospital course notable for shock, respiratry failure s/p intubation now extubated. Repeat CT c/w esophageal leak. Labs notable for persistent leukocytosis. Blood cultures at [facility] and here are negative. Had esophageal stent placement 11/25 with plan for esophagram on 11/29. Respiratory status worsened requiring intubation on 11/27. Repeat CT chest 11/27 demonstrated that right chest tube is no longer in the R loculated fluid collection and persistence of perigastric fluid collection. Perigastric fluid collection not accessible for drainage, but went for IR drainage of R sided pleural fluid collection. New fevers and worsening respiratory status 12/1, repeat cultures obtained.     Recommendations:  - stop daptomycin   - start linezolid for better pulmonary penetration   - continue meropenem 2g q12 (renal dosing), and micafungin 100 mg q24 hours   - will follow pleural fluid and respiratory cultures   - obtain blood cultures in the setting of worsening respiratory status and new fevers

## 2024-12-01 NOTE — ASSESSMENT & PLAN NOTE
Patient with Hypoxic Respiratory failure which is Acute.  he is not on home oxygen. Supplemental oxygen was provided and noted- Vent Mode: A/C  Oxygen Concentration (%):  [40-60] 60  Resp Rate Total:  [19 br/min-41 br/min] 19 br/min  PEEP/CPAP:  [5 cmH20] 5 cmH20  Mean Airway Pressure:  [13 cmH20-15 cmH20] 15 cmH20  .   Signs/symptoms of respiratory failure include- tachypnea, increased work of breathing, and respiratory distress. Contributing diagnoses includes - Aspiration, CHF, Pleural effusion, and Pneumonia Labs and images were reviewed. Patient Has recent ABG, which has been reviewed. Will treat underlying causes and adjust management of respiratory failure as follows-     Overnight with increasing O2 requirements up to 10L HF NC. Increased WOB. Coarse breath sounds upon auscultation. Most recent chest xray with worsening patchy areas of alveolar consolidation. BNP 1484. Likely fluid volume overload vs. Aspiration. CT scan concerning for ARDS    - Duonebs for wheezing  - Maintains on BSA; dapto/wilda/hannah  - Sputum culture NGTD  - Lung protective ventilation  - Wean oxygen for SpO2 >90%  - Daily SAT/SBT  - ABG/VBG prn

## 2024-12-01 NOTE — ASSESSMENT & PLAN NOTE
Patient with Hypoxic Respiratory failure which is Acute.  he is not on home oxygen. Supplemental oxygen was provided and noted- Vent Mode: A/C  Oxygen Concentration (%):  [] 80  Resp Rate Total:  [18 br/min-40 br/min] 32 br/min  PEEP/CPAP:  [5 cmH20-10 cmH20] 10 cmH20  Mean Airway Pressure:  [10 pqI99-11 cmH20] 20 cmH20  .   Signs/symptoms of respiratory failure include- tachypnea, increased work of breathing, and respiratory distress. Contributing diagnoses includes - Aspiration, CHF, Pleural effusion, and Pneumonia Labs and images were reviewed. Patient Has recent ABG, which has been reviewed. Will treat underlying causes and adjust management of respiratory failure as follows-     Overnight with increasing O2 requirements up to 10L HF NC. Increased WOB. Coarse breath sounds upon auscultation. Most recent chest xray with worsening patchy areas of alveolar consolidation. BNP 1484. Likely fluid volume overload vs. Aspiration. CT scan concerning for ARDS    - Duonebs for wheezing  - Maintains on BSA; linezolid/wilda/hannah  - Sputum culture NGTD  - Lung protective ventilation  - Wean oxygen for SpO2 >90%  - Daily SAT/SBT  - ABG/VBG prn

## 2024-12-01 NOTE — ASSESSMENT & PLAN NOTE
Reported EF of 15-20% at outside facility.  Hemodynamically stable.  Echo 11/25/24:    Left Ventricle: normal in size. Ventricular mass is normal. Normal wall thickness. There is concentric remodeling. Regional wall motion abnormalities present. Septal motion is abnormal. There is low normal systolic function with a visually estimated ejection fraction of 50 - 55%. There is indeterminate diastolic function.    Right Ventricle: Normal size and function    Left Atrium: Normal left atrial size.    Right Atrium: Normal - upper limits of normal right atrial size.    Aortic Valve: The aortic valve is a trileaflet valve. There is moderate aortic valve sclerosis. Aortic valve peak velocity is 1.4 m/s. Mean gradient is 5.9 mmHg.    Mitral Valve: The mitral valve is structurally normal. There is no significant regurgitation.    Tricuspid Valve: The tricuspid valve is structurally normal.    Pulmonic Valve: The pulmonic valve is structurally normal. There is no significant regurgitation.    Pulmonary Artery: The estimated pulmonary artery systolic pressure is 35 mmHg.    IVC/SVC: Normal venous pressure at 3 mmHg.    - Strict I/Os  - Latest BNP 1484  - PRN Lasix dosing  - Repeat ECHO

## 2024-12-01 NOTE — ASSESSMENT & PLAN NOTE
11/10/24: CT abdomen showed evidence of pneumomediastinum, pneumoperitoneum all concerning for perforated viscus.   - Robotic assisted laparoscopy performed. 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 Sicard).   - General surgery performed EGD immediately following laparoscopy. Report no evidence of intra-esophageal leakage. Bilateral KENNY drains placed.    11/23/24: Pt. [place] [facility] [place] higher level of care and AES/CTS consults.    - CTS consulted; appreciate recs. They feel he is too frail to undergo surgical management  - AES consulted; appreciate recs. Esophageal stent placed successfully on 11/25  - Repeat Esophogram completed 11/30 with no obvious large leak  - Continue protonix BID  - Monitor for bleeding; trend CBCs  - Transfuse for hgb <7  - Monitor KENNY drains

## 2024-12-01 NOTE — PROGRESS NOTES
[facility] - Medical ICU  Critical Care Medicine  Progress Note    Patient Name: Jose Hernández Sr.  MRN: [id number]  Admission Date: 11/23/2024  Hospital Length of Stay: 8 days  Code Status: Full Code  Attending Provider: Allison Don MD  Primary Care Provider: Tong Cantrell MD   Principal Problem: Esophageal perforation    Subjective:     HPI:  Mr. Hernández is a 69yoM with a PMHx of alcohol use disorder, chronic back pain 2/2 to spinal stenosis, HLD, HTN, and CVA (on plavix) with no residual deficits who presented to OSH on 11/10/24 with epigastric pain and melena; found to have an esophageal perforation. The patient was initally hypotensive, anemic to 5.7, hyperbilirubinemia, lactic acidosis to 9.9. CTAP showed a pneumoperitoneum and pneumomediastinum consistent with a perforated viscus, hiatal hernia with surrounding external gas, and loculated pleural effusions. He was transfused and started on octreotide gtt, protonix, ativan, doxy, zosyn, hannah, vanc, precedex, with AC held. For the loculated pleural effusions, bilateral chest tubes were placed. Diagnostic laparoscopy showed hiatal hernia with gangrene in the lower thoracic esophagus and a perforated esophageal ulcer in the lower thoracic area, and mediastinal fluid pus, food, and pills. He underwent surgical repair on 11/10/24. Additionally, he was found to have HFrEF (EF = 20%).The patient was later extubated but then developed AMS. They repeated imaging with extraluminal air adjacent to the GE junction with increased fluid in the lower posterior mediastium concerning for recurrent esophageal perforation.   On 11/23/24, the patient was transferred to [facility] for AES and CTS consults s/t concern for perforated esophagus and loculated pleural effusions    Per chart review from OSH the patient stopped taking his medications a month before presentation because he thought they were making him weak. Other ROS positive for paleness for 3 weeks prior to presentation.  The patient provided supplemental history and reported that the patient was not eating or drinking except EtOH, drinks 2-3 bottles of wine and 2-4 beers per day.    Hospital/ICU Course:  Mr. Hernández is a 69yoM with PMHx significant for alcohol use disorder, HLD, HTN, and CVA (on plavix) who presented as a transfer for perforated esophageal ulcer. Bilateral chest tubes and bilateral KENNY drains. CT Chest/Abd/Pelvis: Esophageal leak into the paraesophageal posterior mediastinum and likely bilateral pleural spaces. Multifocal ground-glass opacities concerning for acute infectious or inflammatory process.  Small basilar pleural effusions and mild right lower lobe consolidation. ID, Thoracic Surgery, AES consulted. On 11/25, AES successfully placed an esophageal stent. Long-term nutrition will need to be addressed. Overnight with increased WOB and intermittent hypoxia. Chest xray with worsening patchy areas of alveolar consolidation; BNP 1484 - responsive to lasix.Intubated 11/27. CT concerning for ARDS. On vasopressors. CT chest and abdomen with contrast completed without evidence of active esophageal leak.    Interval History/Significant Events: febrile overnight with increasing pressors requirements. Daptomycin discontinued in favor of Linezolid per ID recommendations. Hold in tubing of R CT noted on exam. Palliative care consulted.     Review of Systems   Unable to perform ROS: Intubated     Objective:     Vital Signs (Most Recent):  Temp: 100.1 °F (37.8 °C) (12/01/24 1101)  Pulse: 86 (12/01/24 1155)  Resp: (!) 32 (12/01/24 1155)  BP: 114/65 (11/29/24 1540)  SpO2: 100 % (12/01/24 1155) Vital Signs (24h Range):  Temp:  [98.7 °F (37.1 °C)-101.3 °F (38.5 °C)] 100.1 °F (37.8 °C)  Pulse:  [] 86  Resp:  [16-40] 32  SpO2:  [77 %-100 %] 100 %  Arterial Line BP: ()/(49-62) 112/60   Weight: 77.6 kg (171 lb 1.2 oz)  Body mass index is 27.61 kg/m².      Intake/Output Summary (Last 24 hours) at 12/1/2024 1345  Last data  filed at 12/1/2024 1101  Gross per 24 hour   Intake 6100.82 ml   Output 2960 ml   Net 3140.82 ml          Physical Exam  Constitutional:       Appearance: He is ill-appearing.   Eyes:      Pupils: Pupils are equal, round, and reactive to light.   Cardiovascular:      Rate and Rhythm: Normal rate and regular rhythm.      Pulses: Normal pulses.      Heart sounds: Normal heart sounds.   Pulmonary:      Comments: Intubated on mechanical ventilation  Chest tubes x3 to suction.  Abdominal:      General: Bowel sounds are normal.      Palpations: Abdomen is soft.      Comments: KENNY drain x 2   Genitourinary:     Comments: Johansen catheter with concenetrated yellow urine  Musculoskeletal:         General: Normal range of motion.   Skin:     General: Skin is warm and dry.      Coloration: Skin is pale.   Neurological:      Comments: Sedated            Vents:  Vent Mode: A/C (12/01/24 1155)  Ventilator Initiated: Yes (11/27/24 1522)  Set Rate: 32 BPM (12/01/24 1155)  Vt Set: 385 mL (11/27/24 1522)  PEEP/CPAP: 10 cmH20 (12/01/24 1155)  Oxygen Concentration (%): 80 (12/01/24 1155)  Peak Airway Pressure: 38 cmH20 (12/01/24 1155)  Plateau Pressure: 34 cmH20 (12/01/24 1155)  Total Ve: 10.2 L/m (12/01/24 1155)  F/VT Ratio<105 (RSBI): (!) 101.27 (12/01/24 1155)  Lines/Drains/Airways       Central Venous Catheter Line  Duration             Percutaneous Central Line - Triple Lumen    Internal Jugular Right -- days              Drain  Duration                  Chest Tube   Left Midaxillary -- days         Chest Tube   Right Midaxillary -- days         Closed/Suction Drain   Right Abdomen Bulb -- days         Closed/Suction Drain  Left Abdomen Bulb -- days         Urethral Catheter 11/23/24 1313 Temperature probe 8 days         Chest Tube 11/29/24 1534 Tube - 3 Right Midaxillary;Pleural 14 Fr. 1 day              Airway  Duration                  Airway - Non-Surgical 11/27/24 1520 Endotracheal Tube 3 days              Arterial Line   Duration             Arterial Line 11/27/24 1810 Left Radial 3 days              Peripheral Intravenous Line  Duration                  Peripheral IV - Single Lumen 11/30/24 1452 20 G 1 1/4 in No Anterior;Right Forearm <1 day         Peripheral IV - Single Lumen 12/01/24 0400 20 G Anterior;Left Upper Arm <1 day                  Significant Labs:    CBC/Anemia Profile:  Recent Labs   Lab 11/30/24  0304 12/01/24  0349 12/01/24  1033   WBC 20.65* 26.92*  --    HGB 7.4* 7.5*  --    HCT 23.0* 24.0* 25*    253  --    MCV 82 85  --    RDW 22.3* 22.8*  --         Chemistries:  Recent Labs   Lab 11/30/24  0304 11/30/24  1237 12/01/24  0349 12/01/24  0858 12/01/24  1123      < > 134* 134* 135*   K 3.7   < > 5.7* 5.8* 5.7*      < > 105 103 103   CO2 25   < > 24 20* 22*   BUN 64*   < > 64* 59* 64*   CREATININE 2.0*   < > 2.2* 2.4* 2.4*   CALCIUM 8.9   < > 8.5* 8.7 8.8   ALBUMIN 1.1*  --  1.1*  --   --    PROT 6.7  --  7.0  --   --    BILITOT 2.3*  --  2.6*  --   --    ALKPHOS 162*  --  204*  --   --    ALT 28  --  20  --   --    AST 61*  --  61*  --   --    MG 2.0  --  2.2  --   --    PHOS 4.0  --  5.8*  --   --     < > = values in this interval not displayed.       All pertinent labs within the past 24 hours have been reviewed.    Significant Imaging:  I have reviewed all pertinent imaging results/findings within the past 24 hours.    ABG  Recent Labs   Lab 12/01/24  1033   PH 7.283*   PO2 90   PCO2 57.2*   HCO3 27.1   BE 0     Assessment/Plan:     Neuro  History of CVA (cerebrovascular accident)  CVA in 2012. No residual deficits.     - Holding home plavix/ASA while determining pending surgical/endoscopic interventions   - Neuro checks    Psychiatric  Alcohol use disorder  Per chart review, drinks 2-3 bottles of wine and 3-4 beers per day. Last drink around 11/8/24. Treated with Ativan at OSH.     - Liver ultrasound; without signs of portal HTN  - Monitor for s/s of withdrawal  - Seizure precautions  -  CIWA  - Continue folic acid and thiamine    Pulmonary  Acute hypoxemic respiratory failure  Patient with Hypoxic Respiratory failure which is Acute.  he is not on home oxygen. Supplemental oxygen was provided and noted- Vent Mode: A/C  Oxygen Concentration (%):  [] 80  Resp Rate Total:  [18 br/min-40 br/min] 32 br/min  PEEP/CPAP:  [5 cmH20-10 cmH20] 10 cmH20  Mean Airway Pressure:  [10 mdK16-95 cmH20] 20 cmH20  .   Signs/symptoms of respiratory failure include- tachypnea, increased work of breathing, and respiratory distress. Contributing diagnoses includes - Aspiration, CHF, Pleural effusion, and Pneumonia Labs and images were reviewed. Patient Has recent ABG, which has been reviewed. Will treat underlying causes and adjust management of respiratory failure as follows-     Overnight with increasing O2 requirements up to 10L HF NC. Increased WOB. Coarse breath sounds upon auscultation. Most recent chest xray with worsening patchy areas of alveolar consolidation. BNP 1484. Likely fluid volume overload vs. Aspiration. CT scan concerning for ARDS    - Duonebs for wheezing  - Maintains on BSA; linezolid/wilda/hannah  - Sputum culture NGTD  - Lung protective ventilation  - Wean oxygen for SpO2 >90%  - Daily SAT/SBT  - ABG/VBG prn    Loculated pleural effusion  CT scans concerning for loculated pleural effusions; bilateral large-bore chest tubes inserted prior to transfer. Draining to suction.    --IR consulted, drainage of pleural collection 11/29 with CT placed  - CTS consulted; appreciate recs  - Maintain spO2 >90%  - Continue bilateral chest tubes to suction    Cardiac/Vascular  Acute systolic heart failure  Reported EF of 15-20% at outside facility.  Hemodynamically stable.  Echo 11/25/24:    Left Ventricle: normal in size. Ventricular mass is normal. Normal wall thickness. There is concentric remodeling. Regional wall motion abnormalities present. Septal motion is abnormal. There is low normal systolic function  with a visually estimated ejection fraction of 50 - 55%. There is indeterminate diastolic function.    Right Ventricle: Normal size and function    Left Atrium: Normal left atrial size.    Right Atrium: Normal - upper limits of normal right atrial size.    Aortic Valve: The aortic valve is a trileaflet valve. There is moderate aortic valve sclerosis. Aortic valve peak velocity is 1.4 m/s. Mean gradient is 5.9 mmHg.    Mitral Valve: The mitral valve is structurally normal. There is no significant regurgitation.    Tricuspid Valve: The tricuspid valve is structurally normal.    Pulmonic Valve: The pulmonic valve is structurally normal. There is no significant regurgitation.    Pulmonary Artery: The estimated pulmonary artery systolic pressure is 35 mmHg.    IVC/SVC: Normal venous pressure at 3 mmHg.    - Strict I/Os  - Latest BNP 1484  - PRN Lasix dosing  - Repeat ECHO    Hyperlipidemia  Takes statin at home; holding in the setting of previous transaminitis.    - Daily CMP  - Resume medications as appropriate    Essential hypertension  History of HTN, had stopped taking home medications about a month prior to admission at OSH.    - Holding home meds; resume as appropriate  - Maintain SBP <180    Renal/  Hyperkalemia  -- Q6H BMP  -- Shifted with Lokelma, lasix, albuterol, and calcium gluconate     ALFREDO (acute kidney injury)  Admit creatinine 1.5. Baseline Cr: 0.9.    - Strict I/Os  - Daily CMP  - Avoid nephrotoxic agents  - Renally dose medications  - Hypernatremic; likely s/t volume depletion with dehydration. Continue D5W  - MAP > 65 and Hgb > 7 goal     ID  Sepsis without acute organ dysfunction  CT concerning for for multifocal PNA. Covered with Kade/Apple/Vanc.    - ID consulted; appreciate recs  - Continue Kade and Apple  - Discontinued Dapto, start Linezolid for pulmonary MRSA coverage  - Daily CBC  - Wean pressors to maintain MAP > 65    GI  * Esophageal perforation  11/10/24: CT abdomen showed evidence of  pneumomediastinum, pneumoperitoneum all concerning for perforated viscus.   - Robotic assisted laparoscopy performed. 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 Sicard).   - General surgery performed EGD immediately following laparoscopy. Report no evidence of intra-esophageal leakage. Bilateral KENNY drains placed.    11/23/24: Pt. Transferred to [facility] for higher level of care and AES/CTS consults.    - CTS consulted; appreciate recs. They feel he is too frail to undergo surgical management  - AES consulted; appreciate recs. Esophageal stent placed successfully on 11/25  - Repeat Esophogram completed 11/30 with no obvious large leak  - Continue protonix BID  - Monitor for bleeding; trend CBCs  - Transfuse for hgb <7  - Monitor KENNY drains    Palliative Care  Poor prognosis  -- Continues to decompensate on appropriate medical therapies  -- S/P multiple invasive interventions  -- Palliative care consulted        Critical Care Daily Checklist:    A: Awake: RASS Goal/Actual Goal: RASS Goal: -2-->light sedation  Actual:     B: Spontaneous Breathing Trial Performed? Spon. Breathing Trial Initiated?: Not initiated (12/01/24 0811)   C: SAT & SBT Coordinated?  N/A                      D: Delirium: CAM-ICU Overall CAM-ICU: Positive   E: Early Mobility Performed? No   F: Feeding Goal: Goals: Meet % EEN, EPN by RD f/u date  Status: Nutrition Goal Status: new   Current Diet Order   Procedures    Diet NPO      AS: Analgesia/Sedation Propofol and fentanyl infusions   T: Thromboembolic Prophylaxis Heparin subcut   H: HOB > 300 Yes   U: Stress Ulcer Prophylaxis (if needed) Protonix   G: Glucose Control    B: Bowel Function Stool Occurrence: 1   I: Indwelling Catheter (Lines & Johansen) Necessity CVC   A-line (11/27)  KENNY x2  CT x3  Johansen (11/23)   D: De-escalation of Antimicrobials/Pharmacotherapies No. Dapto d/c'd in favor of Linezolid    Plan for  the day/ETD Treat hyperkalemia  Wean pressors as tolerated  Alter Abx per ID    Code Status:  Family/Goals of Care: Full Code       Critical Care Time: 70 minutes  Critical secondary to Patient has a condition that poses threat to life and bodily function: Esophageal Perforation, Shock      Critical care was time spent personally by me on the following activities: development of treatment plan with patient or surrogate and bedside caregivers, discussions with consultants, evaluation of patient's response to treatment, examination of patient, ordering and performing treatments and interventions, ordering and review of laboratory studies, ordering and review of radiographic studies, pulse oximetry, re-evaluation of patient's condition. This critical care time did not overlap with that of any other provider or involve time for any procedures.     Kathryn Mckinney-Roxana, EDEL  Critical Care Medicine  [facility] - Medical ICU

## 2024-12-01 NOTE — PROGRESS NOTES
[facility] - Medical ICU  Critical Care Medicine  Progress Note    Patient Name: Jose Hernández Sr.  MRN: [id number]  Admission Date: 11/23/2024  Hospital Length of Stay: 7 days  Code Status: Full Code  Attending Provider: Allison Don MD  Primary Care Provider: Tong Cantrell MD   Principal Problem: Esophageal perforation    Subjective:     HPI:  Mr. Hernández is a 69yoM with a PMHx of alcohol use disorder, chronic back pain 2/2 to spinal stenosis, HLD, HTN, and CVA (on plavix) with no residual deficits who presented to OSH on 11/10/24 with epigastric pain and melena; found to have an esophageal perforation. The patient was initally hypotensive, anemic to 5.7, hyperbilirubinemia, lactic acidosis to 9.9. CTAP showed a pneumoperitoneum and pneumomediastinum consistent with a perforated viscus, hiatal hernia with surrounding external gas, and loculated pleural effusions. He was transfused and started on octreotide gtt, protonix, ativan, doxy, zosyn, hannah, vanc, precedex, with AC held. For the loculated pleural effusions, bilateral chest tubes were placed. Diagnostic laparoscopy showed hiatal hernia with gangrene in the lower thoracic esophagus and a perforated esophageal ulcer in the lower thoracic area, and mediastinal fluid pus, food, and pills. He underwent surgical repair on 11/10/24. Additionally, he was found to have HFrEF (EF = 20%).The patient was later extubated but then developed AMS. They repeated imaging with extraluminal air adjacent to the GE junction with increased fluid in the lower posterior mediastium concerning for recurrent esophageal perforation.   On 11/23/24, the patient was transferred to [facility] for AES and CTS consults s/t concern for perforated esophagus and loculated pleural effusions    Per chart review from OSH the patient stopped taking his medications a month before presentation because he thought they were making him weak. Other ROS positive for paleness for 3 weeks prior to presentation.  The patient provided supplemental history and reported that the patient was not eating or drinking except EtOH, drinks 2-3 bottles of wine and 2-4 beers per day.    Hospital/ICU Course:  Mr. Hernández is a 69yoM with PMHx significant for alcohol use disorder, HLD, HTN, and CVA (on plavix) who presented as a transfer for perforated esophageal ulcer. Bilateral chest tubes and bilateral KENNY drains. CT Chest/Abd/Pelvis: Esophageal leak into the paraesophageal posterior mediastinum and likely bilateral pleural spaces. Multifocal ground-glass opacities concerning for acute infectious or inflammatory process.  Small basilar pleural effusions and mild right lower lobe consolidation. ID, Thoracic Surgery, AES consulted. On 11/25, AES successfully placed an esophageal stent. Long-term nutrition will need to be addressed. Overnight with increased WOB and intermittent hypoxia. Chest xray with worsening patchy areas of alveolar consolidation; BNP 1484 - responsive to lasix.Intubated 11/27. CT concerning for ARDS. On vasopressors. CT chest and abdomen with contrast completed without evidence of active esophageal leak.    Interval History/Significant Events: CT chest and abdomen with contrast completed overnight without evidence of esophageal leak.    Review of Systems   Unable to perform ROS: Intubated     Objective:     Vital Signs (Most Recent):  Temp: 98 °F (36.7 °C) (11/30/24 0701)  Pulse: 92 (11/30/24 1120)  Resp: (!) 34 (11/30/24 1120)  BP: 114/65 (11/29/24 1540)  SpO2: 100 % (11/30/24 1120) Vital Signs (24h Range):  Temp:  [97.6 °F (36.4 °C)-98.7 °F (37.1 °C)] 98 °F (36.7 °C)  Pulse:  [86-98] 92  Resp:  [0-53] 34  SpO2:  [93 %-100 %] 100 %  BP: (109-121)/(63-65) 114/65  Arterial Line BP: ()/() 107/58   Weight: 77.6 kg (171 lb 1.2 oz)  Body mass index is 27.61 kg/m².      Intake/Output Summary (Last 24 hours) at 11/30/2024 1300  Last data filed at 11/30/2024 0701  Gross per 24 hour   Intake 857.58 ml   Output 2397  ml   Net -1539.42 ml          Physical Exam  Constitutional:       Appearance: He is ill-appearing.   Eyes:      Pupils: Pupils are equal, round, and reactive to light.   Cardiovascular:      Rate and Rhythm: Normal rate and regular rhythm.      Pulses: Normal pulses.      Heart sounds: Normal heart sounds.   Pulmonary:      Breath sounds: Wheezing present.      Comments: Intubated on mechanical ventilation  Chest tubes x3 to suction.  Abdominal:      General: Bowel sounds are normal.      Palpations: Abdomen is soft.      Comments: KENNY drain x 2   Genitourinary:     Comments: Johansen catheter with concenetrated yellow urine  Musculoskeletal:         General: Normal range of motion.   Skin:     General: Skin is warm and dry.      Coloration: Skin is pale.   Neurological:      Comments: Sedated            Vents:  Vent Mode: A/C (11/30/24 1120)  Ventilator Initiated: Yes (11/27/24 1522)  Set Rate: 18 BPM (11/30/24 1120)  Vt Set: 385 mL (11/27/24 1522)  PEEP/CPAP: 5 cmH20 (11/30/24 1120)  Oxygen Concentration (%): 40 (11/30/24 1120)  Peak Airway Pressure: 27 cmH20 (11/30/24 1120)  Plateau Pressure: 24 cmH20 (11/30/24 1120)  Total Ve: 12.4 L/m (11/30/24 1120)  F/VT Ratio<105 (RSBI): (!) 84.79 (11/30/24 1120)  Lines/Drains/Airways       Central Venous Catheter Line  Duration             Percutaneous Central Line - Triple Lumen    Internal Jugular Right -- days              Drain  Duration                  Chest Tube   Left Midaxillary -- days         Chest Tube   Right Midaxillary -- days         Closed/Suction Drain   Right Abdomen Bulb -- days         Closed/Suction Drain  Left Abdomen Bulb -- days         Urethral Catheter 11/23/24 1313 Temperature probe 6 days         Chest Tube 11/29/24 1534 Tube - 3 Right Midaxillary;Pleural 14 Fr. <1 day              Airway  Duration                  Airway - Non-Surgical 11/27/24 1520 Endotracheal Tube 2 days              Arterial Line  Duration             Arterial Line 11/27/24  1810 Left Radial 2 days              Peripheral Intravenous Line  Duration                  Peripheral IV - Single Lumen 11/23/24 0901 18 G 1 3/4 in No Left Antecubital 7 days                  Significant Labs:    CBC/Anemia Profile:  Recent Labs   Lab 11/29/24 0310 11/29/24 2023 11/30/24  0304   WBC 23.47*  --  20.65*   HGB 7.7*  --  7.4*   HCT 23.6* 24* 23.0*     --  258   MCV 81*  --  82   RDW 22.3*  --  22.3*        Chemistries:  Recent Labs   Lab 11/29/24 0310 11/29/24  1210 11/29/24  1710 11/29/24  2353 11/30/24  0304   *   < > 146* 142 142   K 3.2*   < > 3.6 4.1 3.7      < > 108 108 106   CO2 29   < > 26 26 25   BUN 66*   < > 65* 64* 64*   CREATININE 2.2*   < > 2.1* 2.1* 2.0*   CALCIUM 8.6*   < > 8.9 8.9 8.9   ALBUMIN 1.3*  --   --   --  1.1*   PROT 6.8  --   --   --  6.7   BILITOT 2.6*  --   --   --  2.3*   ALKPHOS 156*  --   --   --  162*   ALT 28  --   --   --  28   AST 68*  --   --   --  61*   MG 2.0  --   --   --  2.0   PHOS 4.0  --   --   --  4.0    < > = values in this interval not displayed.       All pertinent labs within the past 24 hours have been reviewed.    Significant Imaging:  I have reviewed all pertinent imaging results/findings within the past 24 hours.    ABG  Recent Labs   Lab 11/29/24 2023   PH 7.470*   PO2 51*   PCO2 41.8   HCO3 30.4*   BE 7*     Assessment/Plan:     Neuro  History of CVA (cerebrovascular accident)  CVA in 2012. No residual deficits.     - Holding home plavix/ASA while determining pending surgical/endoscopic interventions   - Neuro checks    Psychiatric  Alcohol use disorder  Per chart review, drinks 2-3 bottles of wine and 3-4 beers per day. Last drink around 11/8/24. Treated with Ativan at OSH.     - Liver ultrasound; without signs of portal HTN  - Monitor for s/s of withdrawal  - Seizure precautions  - CIWA  - Continue folic acid and thiamine    Pulmonary  Acute hypoxemic respiratory failure  Patient with Hypoxic Respiratory failure which is  Acute.  he is not on home oxygen. Supplemental oxygen was provided and noted- Vent Mode: A/C  Oxygen Concentration (%):  [40-60] 60  Resp Rate Total:  [19 br/min-41 br/min] 19 br/min  PEEP/CPAP:  [5 cmH20] 5 cmH20  Mean Airway Pressure:  [13 cmH20-15 cmH20] 15 cmH20  .   Signs/symptoms of respiratory failure include- tachypnea, increased work of breathing, and respiratory distress. Contributing diagnoses includes - Aspiration, CHF, Pleural effusion, and Pneumonia Labs and images were reviewed. Patient Has recent ABG, which has been reviewed. Will treat underlying causes and adjust management of respiratory failure as follows-     Overnight with increasing O2 requirements up to 10L HF NC. Increased WOB. Coarse breath sounds upon auscultation. Most recent chest xray with worsening patchy areas of alveolar consolidation. BNP 1484. Likely fluid volume overload vs. Aspiration. CT scan concerning for ARDS    - Duonebs for wheezing  - Maintains on BSA; dapto/wilda/hannah  - Sputum culture NGTD  - Lung protective ventilation  - Wean oxygen for SpO2 >90%  - Daily SAT/SBT  - ABG/VBG prn    Loculated pleural effusion  CT scans concerning for loculated pleural effusions; bilateral large-bore chest tubes inserted prior to transfer. Draining to suction.    --IR consulted, plan for drainage of pleural collection today 11/29  - CTS consulted; appreciate recs  - Maintain spO2 >90%  - Continue bilateral chest tubes to suction    Cardiac/Vascular  Acute systolic heart failure  Reported EF of 15-20% at outside facility.  Hemodynamically stable.  Echo 11/25/24:    Left Ventricle: normal in size. Ventricular mass is normal. Normal wall thickness. There is concentric remodeling. Regional wall motion abnormalities present. Septal motion is abnormal. There is low normal systolic function with a visually estimated ejection fraction of 50 - 55%. There is indeterminate diastolic function.    Right Ventricle: Normal size and function    Left  Atrium: Normal left atrial size.    Right Atrium: Normal - upper limits of normal right atrial size.    Aortic Valve: The aortic valve is a trileaflet valve. There is moderate aortic valve sclerosis. Aortic valve peak velocity is 1.4 m/s. Mean gradient is 5.9 mmHg.    Mitral Valve: The mitral valve is structurally normal. There is no significant regurgitation.    Tricuspid Valve: The tricuspid valve is structurally normal.    Pulmonic Valve: The pulmonic valve is structurally normal. There is no significant regurgitation.    Pulmonary Artery: The estimated pulmonary artery systolic pressure is 35 mmHg.    IVC/SVC: Normal venous pressure at 3 mmHg.    - Strict I/Os  - Latest BNP 1484  - Hold Lasix  - Repeat ECHO    Hyperlipidemia  Takes statin at home; holding in the setting of previous transaminitis.    - Daily CMP  - Resume medications as appropriate    Essential hypertension  History of HTN, had stopped taking home medications about a month prior to admission at OSH.    - Holding home meds; resume as appropriate  - Maintain SBP <180    Renal/  ALFREDO (acute kidney injury)  Admit creatinine 1.5. Baseline Cr: 0.9.    - Strict I/Os  - Daily CMP  - Avoid nephrotoxic agents  - Renally dose medications  - Hypernatremic; likely s/t volume depletion with dehydration. Continue D5W  - MAP > 65 and Hgb > 7 goal     ID  Sepsis without acute organ dysfunction  CT concerning for for multifocal PNA. Covered with Kade/Apple/Vanc.    - ID consulted; appreciate recs  - Continue BSA; Kade/Apple  - Discontinued Vanc; Initiated Dapto  - Daily CBC    GI  * Esophageal perforation  11/10/24: CT abdomen showed evidence of pneumomediastinum, pneumoperitoneum all concerning for perforated viscus.   - Robotic assisted laparoscopy performed. 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 Sicard).   - General surgery performed EGD immediately following laparoscopy.  Report no evidence of intra-esophageal leakage. Bilateral KENNY drains placed.    11/23/24: Pt. Transferred to [facility] [place] higher level of care and AES/CTS consults.    - CTS consulted; appreciate recs. They feel he is too frail to undergo surgical management  - AES consulted; appreciate recs. Esophageal stent placed successfully on 11/25  - Repeat Esophogram completed 11/30 with no obvious large leak  - Continue protonix BID  - Monitor for bleeding; trend CBCs  - Transfuse for hgb <7  - Monitor KENNY drains       Critical Care Daily Checklist:    A: Awake: RASS Goal/Actual Goal: RASS Goal: -4-->deep sedation  Actual:     B: Spontaneous Breathing Trial Performed? Spon. Breathing Trial Initiated?: Not initiated (11/30/24 0800)   C: SAT & SBT Coordinated?  N/A                      D: Delirium: CAM-ICU Overall CAM-ICU: Positive   E: Early Mobility Performed? No   F: Feeding Goal: Goals: Meet % EEN, EPN by RD f/u date  Status: Nutrition Goal Status: new   Current Diet Order   Procedures    Diet NPO      AS: Analgesia/Sedation RASS target 0    T: Thromboembolic Prophylaxis Holding    H: HOB > 300 Yes   U: Stress Ulcer Prophylaxis (if needed) Protonix    G: Glucose Control 140-180 goal    B: Bowel Function Stool Occurrence: 1   I: Indwelling Catheter (Lines & Douglass) Necessity ETT, NGT, CVC, PIV, douglass    D: De-escalation of Antimicrobials/Pharmacotherapies Continue per ID recommendation     Plan for the day/ETD Wean vent support     Code Status:  Family/Goals of Care: Full Code       Critical Care Time: 50 minutes  Critical secondary to Patient has a condition that poses threat to life and bodily function: acute hypoxemic respiratory failure    Plan of care discussed with attending physician. Attestation to follow.       Critical care was time spent personally by me on the following activities: development of treatment plan with patient or surrogate and bedside caregivers, discussions with consultants, evaluation of  patient's response to treatment, examination of patient, ordering and performing treatments and interventions, ordering and review of laboratory studies, ordering and review of radiographic studies, pulse oximetry, re-evaluation of patient's condition. This critical care time did not overlap with that of any other provider or involve time for any procedures.     Jeronimo Dillon NP  Critical Care Medicine  [facility] [place]

## 2024-12-01 NOTE — SUBJECTIVE & OBJECTIVE
Interval History/Significant Events: febrile overnight with increasing pressors requirements. Daptomycin discontinued in favor of Linezolid per ID recommendations. Hold in tubing of R CT noted on exam. Palliative care consulted.     Review of Systems   Unable to perform ROS: Intubated     Objective:     Vital Signs (Most Recent):  Temp: 100.1 °F (37.8 °C) (12/01/24 1101)  Pulse: 86 (12/01/24 1155)  Resp: (!) 32 (12/01/24 1155)  BP: 114/65 (11/29/24 1540)  SpO2: 100 % (12/01/24 1155) Vital Signs (24h Range):  Temp:  [98.7 °F (37.1 °C)-101.3 °F (38.5 °C)] 100.1 °F (37.8 °C)  Pulse:  [] 86  Resp:  [16-40] 32  SpO2:  [77 %-100 %] 100 %  Arterial Line BP: ()/(49-62) 112/60   Weight: 77.6 kg (171 lb 1.2 oz)  Body mass index is 27.61 kg/m².      Intake/Output Summary (Last 24 hours) at 12/1/2024 1345  Last data filed at 12/1/2024 1101  Gross per 24 hour   Intake 6100.82 ml   Output 2960 ml   Net 3140.82 ml          Physical Exam  Constitutional:       Appearance: He is ill-appearing.   Eyes:      Pupils: Pupils are equal, round, and reactive to light.   Cardiovascular:      Rate and Rhythm: Normal rate and regular rhythm.      Pulses: Normal pulses.      Heart sounds: Normal heart sounds.   Pulmonary:      Comments: Intubated on mechanical ventilation  Chest tubes x3 to suction.  Abdominal:      General: Bowel sounds are normal.      Palpations: Abdomen is soft.      Comments: KENNY drain x 2   Genitourinary:     Comments: Johansen catheter with concenetrated yellow urine  Musculoskeletal:         General: Normal range of motion.   Skin:     General: Skin is warm and dry.      Coloration: Skin is pale.   Neurological:      Comments: Sedated            Vents:  Vent Mode: A/C (12/01/24 1155)  Ventilator Initiated: Yes (11/27/24 1522)  Set Rate: 32 BPM (12/01/24 1155)  Vt Set: 385 mL (11/27/24 1522)  PEEP/CPAP: 10 cmH20 (12/01/24 1155)  Oxygen Concentration (%): 80 (12/01/24 1155)  Peak Airway Pressure: 38 cmH20  (12/01/24 1155)  Plateau Pressure: 34 cmH20 (12/01/24 1155)  Total Ve: 10.2 L/m (12/01/24 1155)  F/VT Ratio<105 (RSBI): (!) 101.27 (12/01/24 1155)  Lines/Drains/Airways       Central Venous Catheter Line  Duration             Percutaneous Central Line - Triple Lumen    Internal Jugular Right -- days              Drain  Duration                  Chest Tube   Left Midaxillary -- days         Chest Tube   Right Midaxillary -- days         Closed/Suction Drain   Right Abdomen Bulb -- days         Closed/Suction Drain  Left Abdomen Bulb -- days         Urethral Catheter 11/23/24 1313 Temperature probe 8 days         Chest Tube 11/29/24 1534 Tube - 3 Right Midaxillary;Pleural 14 Fr. 1 day              Airway  Duration                  Airway - Non-Surgical 11/27/24 1520 Endotracheal Tube 3 days              Arterial Line  Duration             Arterial Line 11/27/24 1810 Left Radial 3 days              Peripheral Intravenous Line  Duration                  Peripheral IV - Single Lumen 11/30/24 1452 20 G 1 1/4 in No Anterior;Right Forearm <1 day         Peripheral IV - Single Lumen 12/01/24 0400 20 G Anterior;Left Upper Arm <1 day                  Significant Labs:    CBC/Anemia Profile:  Recent Labs   Lab 11/30/24  0304 12/01/24  0349 12/01/24  1033   WBC 20.65* 26.92*  --    HGB 7.4* 7.5*  --    HCT 23.0* 24.0* 25*    253  --    MCV 82 85  --    RDW 22.3* 22.8*  --         Chemistries:  Recent Labs   Lab 11/30/24  0304 11/30/24  1237 12/01/24  0349 12/01/24  0858 12/01/24  1123      < > 134* 134* 135*   K 3.7   < > 5.7* 5.8* 5.7*      < > 105 103 103   CO2 25   < > 24 20* 22*   BUN 64*   < > 64* 59* 64*   CREATININE 2.0*   < > 2.2* 2.4* 2.4*   CALCIUM 8.9   < > 8.5* 8.7 8.8   ALBUMIN 1.1*  --  1.1*  --   --    PROT 6.7  --  7.0  --   --    BILITOT 2.3*  --  2.6*  --   --    ALKPHOS 162*  --  204*  --   --    ALT 28  --  20  --   --    AST 61*  --  61*  --   --    MG 2.0  --  2.2  --   --    PHOS 4.0  --   5.8*  --   --     < > = values in this interval not displayed.       All pertinent labs within the past 24 hours have been reviewed.    Significant Imaging:  I have reviewed all pertinent imaging results/findings within the past 24 hours.

## 2024-12-01 NOTE — ASSESSMENT & PLAN NOTE
Reported EF of 15-20% at outside facility.  Hemodynamically stable.  Echo 11/25/24:    Left Ventricle: normal in size. Ventricular mass is normal. Normal wall thickness. There is concentric remodeling. Regional wall motion abnormalities present. Septal motion is abnormal. There is low normal systolic function with a visually estimated ejection fraction of 50 - 55%. There is indeterminate diastolic function.    Right Ventricle: Normal size and function    Left Atrium: Normal left atrial size.    Right Atrium: Normal - upper limits of normal right atrial size.    Aortic Valve: The aortic valve is a trileaflet valve. There is moderate aortic valve sclerosis. Aortic valve peak velocity is 1.4 m/s. Mean gradient is 5.9 mmHg.    Mitral Valve: The mitral valve is structurally normal. There is no significant regurgitation.    Tricuspid Valve: The tricuspid valve is structurally normal.    Pulmonic Valve: The pulmonic valve is structurally normal. There is no significant regurgitation.    Pulmonary Artery: The estimated pulmonary artery systolic pressure is 35 mmHg.    IVC/SVC: Normal venous pressure at 3 mmHg.    - Strict I/Os  - Latest BNP 1484  - Hold Lasix  - Repeat ECHO

## 2024-12-01 NOTE — PROGRESS NOTES
[facility] - Medical ICU  Thoracic Surgery  Progress Note    Subjective:     History of Present Illness:  Mr. Hernández is a 69 yoM with PMH significant for chronic alcohol abuse and NSAID use who suffered a perforated esophageal ulcer and underwent promary repair with bilateral chest tube placement and bilateral intraabdominal drain placement on 11/10 at an [facility]. He was transferred here for concern of persistent leak and potential AES, IR, and CTS intervention. Of note, his EF was found to be 20% on [facility] echo. He was transferred to the MICU.     Post-Op Info:  Procedure(s) (LRB):  EGD (ESOPHAGOGASTRODUODENOSCOPY) (N/A)   6 Days Post-Op     Interval History: Patient seems to have decompensated significantly overnight. Vent settings now 80%/10 PEEP from 40%/5 PEEP. There is an air leak from his right sided large bore chest tube, WBC is 26 from 20, he is fevering, is difficult to ventilate with a decreasing pH (7.18). Has gone from 0.15 levo from 0.58 levo and 0.04 vaso.     Medications:  Continuous Infusions:   D5W   Intravenous Continuous 25 mL/hr at 12/01/24 0605 Rate Verify at 12/01/24 0605    fentanyl  0-250 mcg/hr Intravenous Continuous 25 mL/hr at 12/01/24 0605 250 mcg/hr at 12/01/24 0605    NORepinephrine bitartrate-D5W  0-3 mcg/kg/min Intravenous Continuous 24 mL/hr at 12/01/24 0605 0.66 mcg/kg/min at 12/01/24 0605    propofoL  0-50 mcg/kg/min Intravenous Continuous 23.3 mL/hr at 12/01/24 0810 50 mcg/kg/min at 12/01/24 0810    TPN ADULT CENTRAL LINE CUSTOM   Intravenous Continuous 45 mL/hr at 12/01/24 0605 Rate Verify at 12/01/24 0605    vasopressin  0.04 Units/min Intravenous Continuous 12 mL/hr at 12/01/24 0731 0.04 Units/min at 12/01/24 0731     Scheduled Meds:   DAPTOmycin (CUBICIN) IV (PEDS and ADULTS)  8 mg/kg Intravenous Q24H    heparin (porcine)  5,000 Units Subcutaneous Q8H    hydrocortisone sodium succinate  100 mg Intravenous Q8H    meropenem IV (PEDS and ADULTS)  2 g Intravenous Q12H    micafungin  100  mg Intravenous Q24H    mupirocin   Nasal BID    pantoprazole  40 mg Intravenous BID     PRN Meds:  Current Facility-Administered Medications:     acetaminophen, 650 mg, Rectal, Q6H PRN    fentanyl, 50 mcg, Intravenous, Q30 Min PRN    HYDROmorphone, 0.5 mg, Intravenous, Q6H PRN    iohexol, 15 mL, Oral, PRN    sodium chloride 0.9%, 10 mL, Intravenous, PRN     Review of patient's allergies indicates:  No Known Allergies  Objective:     Vital Signs (Most Recent):  Temp: (!) 101.3 °F (38.5 °C) (12/01/24 0701)  Pulse: 95 (12/01/24 0811)  Resp: (!) 32 (12/01/24 0811)  BP: 114/65 (11/29/24 1540)  SpO2: 99 % (12/01/24 0811) Vital Signs (24h Range):  Temp:  [98.1 °F (36.7 °C)-101.3 °F (38.5 °C)] 101.3 °F (38.5 °C)  Pulse:  [] 95  Resp:  [16-40] 32  SpO2:  [77 %-100 %] 99 %  Arterial Line BP: ()/(49-61) 114/61     Intake/Output - Last 3 Shifts         11/29 0700  11/30 0659 11/30 0700  12/01 0659 12/01 0700  12/02 0659    P.O. 0 0 0    I.V. (mL/kg) 890.2 (11.5) 3744.7 (48.3)     IV Piggyback 370.2 496     .7 1860.1     Total Intake(mL/kg) 1868.1 (24.1) 6100.8 (78.6) 0 (0)    Urine (mL/kg/hr) 2725 (1.5) 2100 (1.1) 235 (1.7)    Drains 28 25     Other 125      Stool       Chest Tube 69 50     Total Output 2947 2175 235    Net -1079 +3925.8 -235                   SpO2: 99 %        Physical Exam  Vitals reviewed.   Constitutional:       Appearance: He is ill-appearing.   HENT:      Head: Normocephalic.      Mouth/Throat:      Comments: ETT  Pulmonary:      Comments: Mechanically ventilated. Bilateral chest tubes, two on right and one on left with ss output. New air leak from right sided large bore chest tube  Abdominal:      General: There is no distension.      Palpations: Abdomen is soft.      Comments: Bilateral KENNY drains with serosanguinous output   Genitourinary:     Comments: douglass  Skin:     General: Skin is warm.      Findings: No rash.            Significant Labs:  CBC:   Recent Labs   Lab 12/01/24  0349    WBC 26.92*   RBC 2.83*   HGB 7.5*   HCT 24.0*      MCV 85   MCH 26.5*   MCHC 31.3*     CMP:   Recent Labs   Lab 12/01/24  0349   *   CALCIUM 8.5*   ALBUMIN 1.1*   PROT 7.0   *   K 5.7*   CO2 24      BUN 64*   CREATININE 2.2*   ALKPHOS 204*   ALT 20   AST 61*   BILITOT 2.6*       Significant Diagnostics:  I have reviewed all pertinent imaging results/findings within the past 24 hours.    VTE Risk Mitigation (From admission, onward)           Ordered     heparin (porcine) injection 5,000 Units  Every 8 hours         11/26/24 0851                  Assessment/Plan:     * Esophageal perforation  69 yoM s/p primary [facility] perfor[facility]. Transferred for concern of ongoing leak. No access to outside imaging at this time. Overall clinically stable in the MICU. Not on pressors. Minimal respiratory support. Case discussed with Dr. Goldberg and reviewed with family. Patient is quite frail with poor cardiac function and would not tolerate surgical intervention well, therefore minimally invasive procedures would be preferable.    - underwent stent with AES, large defect near GEJ  - unfortunately, there are few surgical options for this patient as he has already failed attempted surgical repair. And with recent decompensation, he would not tolerate an intervention even if one were indicated at this time  - CT shows no enlarging fluid accumulations with drains in appropriate position. And there is no obvious large leak on CT with contrast through NGT 11/30/2024   - Care per MICU  - Thoracic surgery will follow        James Mendez MD  Thoracic Surgery  [facility] - Medical ICU

## 2024-12-01 NOTE — ASSESSMENT & PLAN NOTE
69 yoM s/p primary repair of esophageal perforation at [facility] on [facility]/10. Transferred for concern of ongoing leak. No access to outside imaging at this time. Overall clinically stable in the MICU. Not on pressors. Minimal respiratory support. Case discussed with Dr. Goldberg and reviewed with family. Patient is quite frail with poor cardiac function and would not tolerate surgical intervention well, therefore minimally invasive procedures would be preferable.    - underwent stent with AES, large defect near GEJ  - unfortunately, there are few surgical options for this patient as he has already failed attempted surgical repair. And with recent decompensation, he would not tolerate an intervention even if one were indicated at this time  - CT shows no enlarging fluid accumulations with drains in appropriate position. And there is no obvious large leak on CT with contrast through NGT 11/30/2024   - Care per MICU  - Thoracic surgery will follow

## 2024-12-01 NOTE — SUBJECTIVE & OBJECTIVE
Interval History: Patient seems to have decompensated significantly overnight. Vent settings now 80%/10 PEEP from 40%/5 PEEP. There is an air leak from his right sided large bore chest tube, WBC is 26 from 20, he is fevering, is difficult to ventilate with a decreasing pH (7.18). Has gone from 0.15 levo from 0.58 levo and 0.04 vaso.     Medications:  Continuous Infusions:   D5W   Intravenous Continuous 25 mL/hr at 12/01/24 0605 Rate Verify at 12/01/24 0605    fentanyl  0-250 mcg/hr Intravenous Continuous 25 mL/hr at 12/01/24 0605 250 mcg/hr at 12/01/24 0605    NORepinephrine bitartrate-D5W  0-3 mcg/kg/min Intravenous Continuous 24 mL/hr at 12/01/24 0605 0.66 mcg/kg/min at 12/01/24 0605    propofoL  0-50 mcg/kg/min Intravenous Continuous 23.3 mL/hr at 12/01/24 0810 50 mcg/kg/min at 12/01/24 0810    TPN ADULT CENTRAL LINE CUSTOM   Intravenous Continuous 45 mL/hr at 12/01/24 0605 Rate Verify at 12/01/24 0605    vasopressin  0.04 Units/min Intravenous Continuous 12 mL/hr at 12/01/24 0731 0.04 Units/min at 12/01/24 0731     Scheduled Meds:   DAPTOmycin (CUBICIN) IV (PEDS and ADULTS)  8 mg/kg Intravenous Q24H    heparin (porcine)  5,000 Units Subcutaneous Q8H    hydrocortisone sodium succinate  100 mg Intravenous Q8H    meropenem IV (PEDS and ADULTS)  2 g Intravenous Q12H    micafungin  100 mg Intravenous Q24H    mupirocin   Nasal BID    pantoprazole  40 mg Intravenous BID     PRN Meds:  Current Facility-Administered Medications:     acetaminophen, 650 mg, Rectal, Q6H PRN    fentanyl, 50 mcg, Intravenous, Q30 Min PRN    HYDROmorphone, 0.5 mg, Intravenous, Q6H PRN    iohexol, 15 mL, Oral, PRN    sodium chloride 0.9%, 10 mL, Intravenous, PRN     Review of patient's allergies indicates:  No Known Allergies  Objective:     Vital Signs (Most Recent):  Temp: (!) 101.3 °F (38.5 °C) (12/01/24 0701)  Pulse: 95 (12/01/24 0811)  Resp: (!) 32 (12/01/24 0811)  BP: 114/65 (11/29/24 1540)  SpO2: 99 % (12/01/24 0811) Vital Signs (24h  Range):  Temp:  [98.1 °F (36.7 °C)-101.3 °F (38.5 °C)] 101.3 °F (38.5 °C)  Pulse:  [] 95  Resp:  [16-40] 32  SpO2:  [77 %-100 %] 99 %  Arterial Line BP: ()/(49-61) 114/61     Intake/Output - Last 3 Shifts         11/29 0700 11/30 0659 11/30 0700 12/01 0659 12/01 0700 12/02 0659    P.O. 0 0 0    I.V. (mL/kg) 890.2 (11.5) 3744.7 (48.3)     IV Piggyback 370.2 496     .7 1860.1     Total Intake(mL/kg) 1868.1 (24.1) 6100.8 (78.6) 0 (0)    Urine (mL/kg/hr) 2725 (1.5) 2100 (1.1) 235 (1.7)    Drains 28 25     Other 125      Stool       Chest Tube 69 50     Total Output 2947 2175 235    Net -1079 +3925.8 -235                   SpO2: 99 %        Physical Exam  Vitals reviewed.   Constitutional:       Appearance: He is ill-appearing.   HENT:      Head: Normocephalic.      Mouth/Throat:      Comments: ETT  Pulmonary:      Comments: Mechanically ventilated. Bilateral chest tubes, two on right and one on left with ss output. New air leak from right sided large bore chest tube  Abdominal:      General: There is no distension.      Palpations: Abdomen is soft.      Comments: Bilateral KENNY drains with serosanguinous output   Genitourinary:     Comments: douglass  Skin:     General: Skin is warm.      Findings: No rash.            Significant Labs:  CBC:   Recent Labs   Lab 12/01/24  0349   WBC 26.92*   RBC 2.83*   HGB 7.5*   HCT 24.0*      MCV 85   MCH 26.5*   MCHC 31.3*     CMP:   Recent Labs   Lab 12/01/24  0349   *   CALCIUM 8.5*   ALBUMIN 1.1*   PROT 7.0   *   K 5.7*   CO2 24      BUN 64*   CREATININE 2.2*   ALKPHOS 204*   ALT 20   AST 61*   BILITOT 2.6*       Significant Diagnostics:  I have reviewed all pertinent imaging results/findings within the past 24 hours.    VTE Risk Mitigation (From admission, onward)           Ordered     heparin (porcine) injection 5,000 Units  Every 8 hours         11/26/24 4677

## 2024-12-01 NOTE — PROGRESS NOTES
[facility] - Medical ICU  Infectious Disease  Progress Note    Patient Name: Jose Hernández Sr.  MRN: [id number]  Admission Date: 11/23/2024  Length of Stay: 8 days  Attending Physician: Allison Don MD  Primary Care Provider: Tong Cantrell MD    Isolation Status: No active isolations  Assessment/Plan:      GI  * Esophageal perforation  Jose Hernández is a 69 year old man with alcohol use disorder, CVA, cdiff (2021) and hypertension transferred from [facility] for esophageal perforation. Found to have pneumoperitoneum/pneumomediastinum on imaging concerning for perforated viscus and loculated pleural effusion s/p robotic lap on 11/10. Had gangrenous thoracic esophagus/hiatal hernia, esophageal ulcer perforation that was repaired with omental patch (op note significant large intraabdominal/mediastinal pus with food stuffs/pills removed from mediastinum, s/p EGD with reported no evidence of intra-esophageal leak, no OR culture sent. Prior hospital course notable for shock, respiratry failure s/p intubation now extubated. Repeat CT c/w esophageal leak. Labs notable for persistent leukocytosis. Blood cultures at [facility] [place] [place] and here are negative. Had esophageal stent placement 11/25 with plan for esophagram on 11/29. Respiratory status worsened requiring intubation on 11/27. Repeat CT chest 11/27 demonstrated that right chest tube is no longer in the R loculated fluid collection and persistence of perigastric fluid collection. Perigastric fluid collection not accessible for drainage, but went for IR drainage of R sided pleural fluid collection. New fevers and worsening respiratory status 12/1, repeat cultures obtained.     Recommendations:  - stop daptomycin   - start linezolid for better pulmonary penetration   - continue meropenem 2g q12 (renal dosing), and micafungin 100 mg q24 hours   - will follow pleural fluid and respiratory cultures   - obtain blood cultures in the setting of worsening respiratory  status and new fevers      Above discussed with primary team.     Critical care time: 40 minutes  I personally spent critical care time on the following: evaluating this patient's organ dysfunction, development of treatment plan, discussing treatment plan with patient or surrogate and bedside caregivers, discussions with critical care service and/or consultants, evaluation of patient's response to treatment, physical examination of patient, ordering and review of treatments interventions, laboratory studies, and radiographic studies, re-evaluation of patient's condition. This critical care time did not overlap with that of any other provider of the same specialty or involve time for procedures. This patient has increasing ventilator requirements, increasing pressor requirements, worsening renal dysfunction. They continue to be critically ill.       Anticipated Disposition: TBD    Thank you for your consult. I will follow-up with patient. Please contact us if you have any additional questions.    Candy Baxter MD  Infectious Disease  [facility] - Medical ICU    Subjective:     Principal Problem:Esophageal perforation    HPI: 68 yo male with alcohol use, CVA, prior cdiff 2021 and HTN admitted for HLOC for esophageal perforation. At [facility], pt was found to have pneumoperitoneum/pneumomediastinum on imaging c/f perforated viscus and loculated pleural effusion s/p robotic lap on 11/10, found to have gangrenous thoracic esophagus/hiatal hernia, esophageal ulcer perforation that was repaired with omental patch (op note significant large intraabdominal/mediastinal pus with food stuff/pills removed from mediastinum, s/p EGD with reported no evidence of intra-esophageal leak. Prior hospital course notable for shock, respiratry failure s/p intubation now extubated. Pt is currently on vancomycin, meropenem, and micafungin. CTS and GI consulted.          Interval History: febrile this AM and oxygenation worsening.     Review of  Systems   Reason unable to perform ROS: intubated, sedated.     Objective:     Vital Signs (Most Recent):  Temp: 100.1 °F (37.8 °C) (12/01/24 1101)  Pulse: 86 (12/01/24 1155)  Resp: (!) 32 (12/01/24 1155)  BP: 114/65 (11/29/24 1540)  SpO2: 100 % (12/01/24 1155) Vital Signs (24h Range):  Temp:  [98.7 °F (37.1 °C)-101.3 °F (38.5 °C)] 100.1 °F (37.8 °C)  Pulse:  [] 86  Resp:  [16-40] 32  SpO2:  [77 %-100 %] 100 %  Arterial Line BP: ()/(49-62) 112/60     Weight: 77.6 kg (171 lb 1.2 oz)  Body mass index is 27.61 kg/m².    Estimated Creatinine Clearance: 28.5 mL/min (A) (based on SCr of 2.4 mg/dL (H)).     Physical Exam  Vitals reviewed.   Constitutional:       Appearance: He is ill-appearing.   HENT:      Head: Normocephalic.      Mouth/Throat:      Comments: ETT  Pulmonary:      Comments: Mechanically ventilated. 2 right sided chest tubes, 1 left sided chest tube  Abdominal:      General: There is no distension.      Palpations: Abdomen is soft.      Comments: Bilateral KENNY drains   Skin:     General: Skin is warm.      Findings: Lesion present. No rash.          Significant Labs:   Microbiology Results (last 7 days)       Procedure Component Value Units Date/Time    Culture, Respiratory with Gram Stain [[id number]] Collected: 12/01/24 0825    Order Status: Completed Specimen: Respiratory from Tracheal Aspirate Updated: 12/01/24 1042     Gram Stain (Respiratory) <10 epithelial cells per low power field.     Gram Stain (Respiratory) No WBC's     Gram Stain (Respiratory) Rare  Gram positive rods    Culture, Anaerobic [[id number]] Collected: 11/29/24 1539    Order Status: Completed Specimen: Pleural Fluid Updated: 11/30/24 1328     Anaerobic Culture Culture in progress    Aerobic culture [[id number]] Collected: 11/29/24 1539    Order Status: Completed Specimen: Pleural Fluid Updated: 11/30/24 0618     Aerobic Bacterial Culture No growth    Gram stain [[id number]] Collected: 11/29/24 1539    Order Status:  Completed Specimen: Pleural Fluid Updated: 11/29/24 2310     Gram Stain Result Many WBC's      No organisms seen    Blood culture [[id number]] Collected: 11/24/24 1635    Order Status: Completed Specimen: Blood from Peripheral, Upper Arm, Left Updated: 11/29/24 1812     Blood Culture, Routine No growth after 5 days.    Fungus culture [[id number]] Collected: 11/29/24 1539    Order Status: Sent Specimen: Pleural Fluid Updated: 11/29/24 1717    Blood culture [[id number]] Collected: 11/24/24 0840    Order Status: Completed Specimen: Blood Updated: 11/29/24 1012     Blood Culture, Routine No growth after 5 days.    Culture, Respiratory with Gram Stain [[id number]]     Order Status: No result Specimen: Respiratory             Significant Imaging: I have reviewed all pertinent imaging results/findings within the past 24 hours.

## 2024-12-01 NOTE — ASSESSMENT & PLAN NOTE
11/10/24: CT abdomen showed evidence of pneumomediastinum, pneumoperitoneum all concerning for perforated viscus.   - Robotic assisted laparoscopy performed. 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 Sicard).   - General surgery performed EGD immediately following laparoscopy. Report no evidence of intra-esophageal leakage. Bilateral KENNY drains placed.    11/23/24: Pt. Transferred to [facility] MICU for higher level of care and AES/CTS consults.    - CTS consulted; appreciate recs. They feel he is too frail to undergo surgical management  - AES consulted; appreciate recs. Esophageal stent placed successfully on 11/25  - Repeat Esophogram completed 11/30 with no obvious large leak  - Continue protonix BID  - Monitor for bleeding; trend CBCs  - Transfuse for hgb <7  - Monitor KENNY drains

## 2024-12-01 NOTE — SUBJECTIVE & OBJECTIVE
Interval History: febrile this AM and oxygenation worsening.     Review of Systems   Reason unable to perform ROS: intubated, sedated.     Objective:     Vital Signs (Most Recent):  Temp: 100.1 °F (37.8 °C) (12/01/24 1101)  Pulse: 86 (12/01/24 1155)  Resp: (!) 32 (12/01/24 1155)  BP: 114/65 (11/29/24 1540)  SpO2: 100 % (12/01/24 1155) Vital Signs (24h Range):  Temp:  [98.7 °F (37.1 °C)-101.3 °F (38.5 °C)] 100.1 °F (37.8 °C)  Pulse:  [] 86  Resp:  [16-40] 32  SpO2:  [77 %-100 %] 100 %  Arterial Line BP: ()/(49-62) 112/60     Weight: 77.6 kg (171 lb 1.2 oz)  Body mass index is 27.61 kg/m².    Estimated Creatinine Clearance: 28.5 mL/min (A) (based on SCr of 2.4 mg/dL (H)).     Physical Exam  Vitals reviewed.   Constitutional:       Appearance: He is ill-appearing.   HENT:      Head: Normocephalic.      Mouth/Throat:      Comments: ETT  Pulmonary:      Comments: Mechanically ventilated. 2 right sided chest tubes, 1 left sided chest tube  Abdominal:      General: There is no distension.      Palpations: Abdomen is soft.      Comments: Bilateral KENNY drains   Skin:     General: Skin is warm.      Findings: Lesion present. No rash.          Significant Labs:   Microbiology Results (last 7 days)       Procedure Component Value Units Date/Time    Culture, Respiratory with Gram Stain [[id number]] Collected: 12/01/24 0825    Order Status: Completed Specimen: Respiratory from Tracheal Aspirate Updated: 12/01/24 1042     Gram Stain (Respiratory) <10 epithelial cells per low power field.     Gram Stain (Respiratory) No WBC's     Gram Stain (Respiratory) Rare  Gram positive rods    Culture, Anaerobic [[id number]] Collected: 11/29/24 1539    Order Status: Completed Specimen: Pleural Fluid Updated: 11/30/24 1328     Anaerobic Culture Culture in progress    Aerobic culture [[id number]] Collected: 11/29/24 1539    Order Status: Completed Specimen: Pleural Fluid Updated: 11/30/24 0618     Aerobic Bacterial Culture No growth     Gram stain [[id number]] Collected: 11/29/24 1539    Order Status: Completed Specimen: Pleural Fluid Updated: 11/29/24 2310     Gram Stain Result Many WBC's      No organisms seen    Blood culture [[id number]] Collected: 11/24/24 1635    Order Status: Completed Specimen: Blood from Peripheral, Upper Arm, Left Updated: 11/29/24 1812     Blood Culture, Routine No growth after 5 days.    Fungus culture [[id number]] Collected: 11/29/24 1539    Order Status: Sent Specimen: Pleural Fluid Updated: 11/29/24 1717    Blood culture [[id number]] Collected: 11/24/24 0840    Order Status: Completed Specimen: Blood Updated: 11/29/24 1012     Blood Culture, Routine No growth after 5 days.    Culture, Respiratory with Gram Stain [[id number]]     Order Status: No result Specimen: Respiratory             Significant Imaging: I have reviewed all pertinent imaging results/findings within the past 24 hours.

## 2024-12-01 NOTE — ASSESSMENT & PLAN NOTE
CT scans concerning for loculated pleural effusions; bilateral large-bore chest tubes inserted prior to transfer. Draining to suction.    --IR consulted, drainage of pleural collection 11/29 with CT placed  - CTS consulted; appreciate recs  - Maintain spO2 >90%  - Continue bilateral chest tubes to suction

## 2024-12-01 NOTE — ASSESSMENT & PLAN NOTE
CT concerning for for multifocal PNA. Covered with Kade/Apple/Vanc.    - ID consulted; appreciate recs  - Continue Kade and Apple  - Discontinued Dapto, start Linezolid for pulmonary MRSA coverage  - Daily CBC  - Wean pressors to maintain MAP > 65

## 2024-12-01 NOTE — ASSESSMENT & PLAN NOTE
-- Continues to decompensate on appropriate medical therapies  -- S/P multiple invasive interventions  -- Palliative care consulted

## 2024-12-01 NOTE — ASSESSMENT & PLAN NOTE
Admit creatinine 1.5. Baseline Cr: 0.9.    - Strict I/Os  - Daily CMP  - Avoid nephrotoxic agents  - Renally dose medications  - Hypernatremic; likely s/t volume depletion with dehydration. Continue D5W  - MAP > 65 and Hgb > 7 goal

## 2024-12-01 NOTE — PROGRESS NOTES
Updated Ms. Cherry Hernández (daughter) via telephone regarding patient's clinical status. Reviewed lab results and current therapies patient is receiving including elevated potassium, concern for infection on multiple Abx, multiple invasive procedures, artificial nutrition, ventilator dependent, and increasing vasopressor requirements. Shared my concerns that patient is worsening rather than improving and this may be a terminal illness for Mr. Hernández. Ofelia vocalized understanding. All questions answered and concerns addressed.      Kathryn Moreno, EDEL  Critical Care Medicine  12/1/2024   2:11 PM

## 2024-12-02 PROBLEM — Z71.89 GOALS OF CARE, COUNSELING/DISCUSSION: Status: ACTIVE | Noted: 2024-01-01

## 2024-12-02 PROBLEM — R65.21 SEPTIC SHOCK: Status: ACTIVE | Noted: 2024-01-01

## 2024-12-02 PROBLEM — Z51.5 PALLIATIVE CARE ENCOUNTER: Status: ACTIVE | Noted: 2024-01-01

## 2024-12-02 PROBLEM — Z71.89 ADVANCE CARE PLANNING: Status: ACTIVE | Noted: 2024-01-01

## 2024-12-02 PROBLEM — R52 PAIN: Status: ACTIVE | Noted: 2024-01-01

## 2024-12-02 NOTE — SUBJECTIVE & OBJECTIVE
Interval History/Significant Events: Coming down steadily on levophed infusion. Yeast growing in pleural fluid cultures. Ongoing GOC with family.     Review of Systems   Unable to perform ROS: Intubated     Objective:     Vital Signs (Most Recent):  Temp: 97.8 °F (36.6 °C) (12/02/24 1115)  Pulse: 65 (12/02/24 1400)  Resp: (!) 32 (12/02/24 1400)  BP: 124/67 (12/02/24 0920)  SpO2: 100 % (12/02/24 1400) Vital Signs (24h Range):  Temp:  [97.5 °F (36.4 °C)-98.8 °F (37.1 °C)] 97.8 °F (36.6 °C)  Pulse:  [62-82] 65  Resp:  [16-35] 32  SpO2:  [100 %] 100 %  BP: (109-124)/(59-67) 124/67  Arterial Line BP: ()/(47-76) 139/76   Weight: 77.6 kg (171 lb 1.2 oz)  Body mass index is 27.61 kg/m².      Intake/Output Summary (Last 24 hours) at 12/2/2024 1602  Last data filed at 12/2/2024 1315  Gross per 24 hour   Intake 4348.95 ml   Output 3310 ml   Net 1038.95 ml          Physical Exam  Constitutional:       Appearance: He is ill-appearing and toxic-appearing.      Interventions: He is sedated and intubated.   HENT:      Mouth/Throat:      Mouth: Mucous membranes are dry.   Eyes:      Pupils: Pupils are equal, round, and reactive to light.   Cardiovascular:      Rate and Rhythm: Normal rate and regular rhythm.      Pulses: Normal pulses.      Heart sounds: Normal heart sounds.   Pulmonary:      Effort: He is intubated.      Comments: Intubated on mechanical ventilation  Chest tubes x3 to suction.  Abdominal:      General: Bowel sounds are normal.      Palpations: Abdomen is soft.      Comments: KENNY drain x 2   Genitourinary:     Comments: Johansen catheter  Musculoskeletal:         General: Normal range of motion.   Skin:     General: Skin is warm and dry.      Coloration: Skin is pale.            Vents:  Vent Mode: A/C (12/02/24 1255)  Ventilator Initiated: Yes (11/27/24 1522)  Set Rate: 32 BPM (12/02/24 1255)  Vt Set: 385 mL (11/27/24 1522)  PEEP/CPAP: 10 cmH20 (12/02/24 1255)  Oxygen Concentration (%): 70 (12/02/24 1400)  Peak  Airway Pressure: 37 cmH20 (12/02/24 1255)  Plateau Pressure: 34 cmH20 (12/02/24 1255)  Total Ve: 10.7 L/m (12/02/24 1255)  F/VT Ratio<105 (RSBI): (!) 95.81 (12/02/24 1255)  Lines/Drains/Airways       Central Venous Catheter Line  Duration             Percutaneous Central Line - Triple Lumen    Internal Jugular Right -- days              Drain  Duration                  Chest Tube   Left Midaxillary -- days         Chest Tube   Right Midaxillary -- days         Closed/Suction Drain   Right Abdomen Bulb -- days         Closed/Suction Drain  Left Abdomen Bulb -- days         Urethral Catheter 11/23/24 1313 Temperature probe 9 days         Chest Tube 11/29/24 1534 Tube - 3 Right Midaxillary;Pleural 14 Fr. 3 days         NG/OG Tube 11/30/24 1100 14 Fr. Left nostril 2 days              Airway  Duration                  Airway - Non-Surgical 11/27/24 1520 Endotracheal Tube 5 days              Arterial Line  Duration             Arterial Line 11/27/24 1810 Left Radial 4 days              Peripheral Intravenous Line  Duration                  Peripheral IV - Single Lumen 12/01/24 0400 20 G Anterior;Left Upper Arm 1 day         Peripheral IV - Single Lumen 12/01/24 1603 18 G 1 3/4 in No Anterior;Right Forearm <1 day                  Significant Labs:    CBC/Anemia Profile:  Recent Labs   Lab 12/01/24  0349 12/01/24  1033 12/02/24  0355 12/02/24  0406   WBC 26.92*  --  21.91*  --    HGB 7.5*  --  6.3*  --    HCT 24.0* 25* 19.7* 20*     --  248  --    MCV 85  --  82  --    RDW 22.8*  --  22.7*  --         Chemistries:  Recent Labs   Lab 12/01/24  0349 12/01/24  0858 12/01/24  2337 12/02/24  0355 12/02/24  1314   *   < > 134* 135* 137   K 5.7*   < > 4.6 4.2 3.2*      < > 101 101 101   CO2 24   < > 22* 22* 23   BUN 64*   < > 70* 76* 79*   CREATININE 2.2*   < > 2.5* 2.5* 2.3*   CALCIUM 8.5*   < > 8.6* 8.4* 8.1*   ALBUMIN 1.1*  --   --  1.0*  --    PROT 7.0  --   --  7.0  --    BILITOT 2.6*  --   --  2.2*  --     ALKPHOS 204*  --   --  169*  --    ALT 20  --   --  23  --    AST 61*  --   --  79*  --    MG 2.2  --   --  2.2  --    PHOS 5.8*  --   --  7.5*  --     < > = values in this interval not displayed.       All pertinent labs within the past 24 hours have been reviewed.    Significant Imaging:  I have reviewed all pertinent imaging results/findings within the past 24 hours.

## 2024-12-02 NOTE — SUBJECTIVE & OBJECTIVE
Interval History: Pt intubated on sedation    Past Medical History:   Diagnosis Date    Arthritis     Gout     Hyperlipemia     Hypertension     Stroke 2012       Past Surgical History:   Procedure Laterality Date    ESOPHAGOGASTRODUODENOSCOPY N/A 11/25/2024    Procedure: EGD (ESOPHAGOGASTRODUODENOSCOPY);  Surgeon: Som Balbuena MD;  Location: [address]);  Service: Endoscopy;  Laterality: N/A;    HAND SURGERY      SKIN GRAFT         Review of patient's allergies indicates:  No Known Allergies    Medications:  Continuous Infusions:   D10W   Intravenous Continuous PRN        fentanyl  0-250 mcg/hr Intravenous Continuous 20 mL/hr at 12/02/24 0800 200 mcg/hr at 12/02/24 0800    NORepinephrine bitartrate-D5W  0-3 mcg/kg/min Intravenous Continuous 4.4 mL/hr at 12/02/24 0800 0.12 mcg/kg/min at 12/02/24 0800    propofoL  0-50 mcg/kg/min Intravenous Continuous 14 mL/hr at 12/02/24 0800 30 mcg/kg/min at 12/02/24 0800    TPN ADULT CENTRAL LINE CUSTOM   Intravenous Continuous 45 mL/hr at 12/02/24 0800 Rate Verify at 12/02/24 0800    TPN ADULT CENTRAL LINE CUSTOM   Intravenous Continuous        vasopressin  0.04 Units/min Intravenous Continuous 12 mL/hr at 12/02/24 0955 0.04 Units/min at 12/02/24 0955     Scheduled Meds:   heparin (porcine)  5,000 Units Subcutaneous Q8H    hydrocortisone sodium succinate  100 mg Intravenous Q8H    linezolid  600 mg Intravenous Q12H    meropenem IV (PEDS and ADULTS)  2 g Intravenous Q12H    micafungin  100 mg Intravenous Q24H    mupirocin   Nasal BID    pantoprazole  40 mg Intravenous BID     PRN Meds:  Current Facility-Administered Medications:     0.9%  NaCl infusion (for blood administration), , Intravenous, Q24H PRN    acetaminophen, 650 mg, Rectal, Q6H PRN    D10W, , Intravenous, Continuous PRN    dextrose 10%, 12.5 g, Intravenous, PRN    dextrose 10%, 25 g, Intravenous, PRN    fentanyl, 50 mcg, Intravenous, Q30 Min PRN    glucagon (human recombinant), 1 mg, Intramuscular, PRN     HYDROmorphone, 0.5 mg, Intravenous, Q6H PRN    insulin aspart U-100, 0-5 Units, Subcutaneous, Q4H PRN    iohexol, 15 mL, Oral, PRN    sodium chloride 0.9%, 10 mL, Intravenous, PRN    Family History       Problem Relation (Age of Onset)    Hypertension Father    No Known Problems Mother          Tobacco Use    Smoking status: Never    Smokeless tobacco: Never   Substance and Sexual Activity    Alcohol use: Yes    Drug use: Yes     Types: Marijuana    Sexual activity: Yes       Review of Systems   Unable to perform ROS: Acuity of condition     Objective:     Vital Signs (Most Recent):  Temp: 98.6 °F (37 °C) (12/02/24 0920)  Pulse: 70 (12/02/24 0853)  Resp: (!) 32 (12/02/24 0853)  BP: 124/67 (12/02/24 0920)  SpO2: 100 % (12/02/24 0853) Vital Signs (24h Range):  Temp:  [97.5 °F (36.4 °C)-100.1 °F (37.8 °C)] 98.6 °F (37 °C)  Pulse:  [70-88] 70  Resp:  [16-35] 32  SpO2:  [100 %] 100 %  BP: (109-124)/(59-67) 124/67  Arterial Line BP: ()/(47-70) 109/59     Weight: 77.6 kg (171 lb 1.2 oz)  Body mass index is 27.61 kg/m².       Physical Exam  Constitutional:       Interventions: He is intubated.      Comments: On sedation, intubated   HENT:      Head: Normocephalic and atraumatic.   Cardiovascular:      Comments: On pressor support  Pulmonary:      Effort: He is intubated.   Skin:     General: Skin is warm and dry.   Neurological:      Comments: On sedation            Review of Symptoms      Symptom Assessment (ESAS 0-10 Scale)  Pain:  0  Dyspnea:  0  Anxiety:  0  Nausea:  0  Depression:  0  Anorexia:  0  Fatigue:  0  Insomnia:  0  Restlessness:  0  Agitation:  0         Performance Status:  10    Psychosocial/Cultural:   See Palliative Psychosocial Note: No  Pt has two daughters and one son. Pt has 16 grandchildren. Pt and son live in [place]. Pt's daughters live in [facility].  Pt was drinking prior to hospitalization. 3 bottles of wine and 4 cans of beer per day.   **Primary  to Follow**  Palliative  Care  Consult: Yes    Spiritual:  F - Monserrat and Belief:  Tyson BRAMBILA - Address in Care:  Will ask chaplain Jeannette to visit.         Advance Care Planning   Advance Directives:   Living Will: No    Do Not Resuscitate Status: No    Medical Power of : No    Agent's Name:  Pt's two daughters and son at bedside.    Decision Making:  Patient answered questions  Goals of Care: The family endorses that what is most important right now is to focus on continuing treatment at this time,     Accordingly, we have decided that the best plan to meet the patient's goals includes continuing with treatment         Significant Labs: All pertinent labs within the past 24 hours have been reviewed.  CBC:   Recent Labs   Lab 12/02/24  0355 12/02/24  0406   WBC 21.91*  --    HGB 6.3*  --    HCT 19.7* 20*   MCV 82  --      --      BMP:  Recent Labs   Lab 12/02/24 0355   *   *   K 4.2      CO2 22*   BUN 76*   CREATININE 2.5*   CALCIUM 8.4*   MG 2.2     LFT:  Lab Results   Component Value Date    AST 79 (H) 12/02/2024    ALKPHOS 169 (H) 12/02/2024    BILITOT 2.2 (H) 12/02/2024     Albumin:   Albumin   Date Value Ref Range Status   12/02/2024 1.0 (L) 3.5 - 5.2 g/dL Final     Protein:   Total Protein   Date Value Ref Range Status   12/02/2024 7.0 6.0 - 8.4 g/dL Final     Lactic acid:   Lab Results   Component Value Date    LACTATE 0.8 12/01/2024    LACTATE 1.0 11/27/2024       Significant Imaging: I have reviewed all pertinent imaging results/findings within the past 24 hours.

## 2024-12-02 NOTE — ASSESSMENT & PLAN NOTE
Per chart review, drinks 2-3 bottles of wine and 3-4 beers per day. Last drink around 11/8/24. Treated with Ativan at OSH.     - Liver ultrasound; without signs of portal HTN  - Monitor for s/s of withdrawal  - Seizure precautions  - Continue folic acid and thiamine

## 2024-12-02 NOTE — NURSING
MICU DAILY GOALS     Family/Goals of care/Code Status   Code Status: Full Code    24H Vital Sign Range  Temp:  [98.5 °F (36.9 °C)-101.3 °F (38.5 °C)]   Pulse:  []   Resp:  [16-40]   SpO2:  [77 %-100 %]   Arterial Line BP: ()/(49-62)      Shift Events (include procedures and significant events)   Pressor requirements decreased from 0.68 of levophed-->0.26, remains on vasopressin intubated and sedated on propofol and fentanyl.    AWAKE RASS: Goal - RASS Goal: -2-->light sedation  Actual - RASS ([place] Agitation-Sedation Scale): deep sedation    Restraint necessity: Not necessary   BREATHE SBT: Not attempted    Coordinate A & B, analgesics/sedatives Pain: managed   SAT: Fail   Delirium CAM-ICU: Overall CAM-ICU: Positive   Early(intubated/ Progressive (non-intubated) Mobility MOVE Screen (INTUBATED ONLY): Fail    Activity: Activity Management: Rolling - L1   Feeding/Nutrition Diet order: Diet/Nutrition Received: NPO,     Thrombus DVT prophylaxis: VTE Core Measure: Pharmacological prophylaxis initiated/maintained   HOB Elevation Head of Bed (HOB) Positioning: HOB elevated, HOB at 30 degrees   Ulcer Prophylaxis GI: yes   Glucose control managed Glycemic Management: blood glucose monitored   Skin Skin assessment:     Sacrum intact/not altered? Yes  Heels intact/not altered? Yes  Surgical wound? No    CHECK ONE!   (no altered skin or altered skin) and sub boxes:  [] No Altered Skin Integrity Present    []Prevention Measures Documented    [] Altered Skin Integrity Present or Discovered   [] LDA present in EPIC, daily doc completed              [] LDA added if not in EPIC (describe wound).                    When describing wound, do not stage, use descriptive words only.    [] Wound Image Taken (required on admit,                   transfer/discharge and every Tuesday)    Wound Care Consulted? No   Bowel Function no issues    Indwelling Catheter Necessity      Urethral Catheter 11/23/24 1313 Temperature  probe-Reason for Continuing Urinary Catheterization: Critically ill in ICU and requiring hourly monitoring of intake/output  [REMOVED]      Urethral Catheter  -Reason for Continuing Urinary Catheterization: Critically ill in ICU and requiring hourly monitoring of intake/output    Percutaneous Central Line - Triple Lumen    Internal Jugular Right-Line Necessity Review: Hemodynamic instability     De-escalation Antibiotics Yes        VS and assessment per flow sheet, patient progressing towards goals as tolerated, plan of care reviewed with family, all concerns addressed, will continue to monitor.

## 2024-12-02 NOTE — PROGRESS NOTES
[facility] - Medical ICU  Infectious Disease  Progress Note    Patient Name: Jose Hernández Sr.  MRN: [id number]  Admission Date: 11/23/2024  Length of Stay: 9 days  Attending Physician: Eduardo Garber MD  Primary Care Provider: Tong Cantrell MD    Isolation Status: No active isolations  Assessment/Plan:      GI  * Esophageal perforation  Jose Hernández is a 69 year old man with alcohol use disorder, CVA, cdiff (2021) and hypertension transferred from [facility] for esophageal perforation. Found to have pneumoperitoneum/pneumomediastinum on imaging concerning for perforated viscus and loculated pleural effusion s/p robotic lap on 11/10. Had gangrenous thoracic esophagus/hiatal hernia, esophageal ulcer perforation that was repaired with omental patch (op note significant large intraabdominal/mediastinal pus with food stuffs/pills removed from mediastinum, s/p EGD with reported no evidence of intra-esophageal leak, no OR culture sent. Prior hospital course notable for shock, respiratry failure s/p intubation now extubated. Repeat CT c/w esophageal leak. Labs notable for persistent leukocytosis. Blood cultures at [facility] and here are negative. Had esophageal stent placement 11/25 with plan for esophagram on 11/29. Respiratory status worsened requiring intubation on 11/27. Repeat CT chest 11/27 demonstrated that right chest tube is no longer in the R loculated fluid collection and persistence of perigastric fluid collection. Perigastric fluid collection not accessible for drainage, but went for IR drainage of R sided pleural fluid collection. New fevers and worsening respiratory status 12/1, repeat cultures obtained.      Tracheal aspirate growing a GNB (previously Steno) while pleural fluid is growing Candida parapsilosis and Stenotrophomonas.    Recommendations:  - continue meropenem 2g q12 (renal dosing), micafungin 100 mg q24 hours, and Zyvox  - if no improvement, consider adding eravacycline  for Stenotrophomonas (assuming you would like to avoid Bactrim, the DOC)  - f/u cultures    Discussed with family at bedside.     Ellis Villegas MD  Infectious Disease  [facility] - Medical ICU    Subjective:     Principal Problem:Esophageal perforation    HPI: 70 yo male with alcohol use, CVA, prior cdiff 2021 and HTN admitted for HLOC for esophageal perforation. At OSH, pt was found to have pneumoperitoneum/pneumomediastinum on imaging c/f perforated viscus and loculated pleural effusion s/p robotic lap on 11/10, found to have gangrenous thoracic esophagus/hiatal hernia, esophageal ulcer perforation that was repaired with omental patch (op note significant large intraabdominal/mediastinal pus with food stuff/pills removed from mediastinum, s/p EGD with reported no evidence of intra-esophageal leak. Prior hospital course notable for shock, respiratry failure s/p intubation now extubated. Pt is currently on vancomycin, meropenem, and micafungin. CTS and GI consulted.          Interval History: Remains quite ill in ICU. Broadly covered with abx. Family at bedside.    Review of Systems   Unable to perform ROS: Intubated     Objective:     Vital Signs (Most Recent):  Temp: 97.8 °F (36.6 °C) (12/02/24 1115)  Pulse: 65 (12/02/24 1400)  Resp: (!) 32 (12/02/24 1400)  BP: 124/67 (12/02/24 0920)  SpO2: 100 % (12/02/24 1400) Vital Signs (24h Range):  Temp:  [97.5 °F (36.4 °C)-98.8 °F (37.1 °C)] 97.8 °F (36.6 °C)  Pulse:  [62-85] 65  Resp:  [16-35] 32  SpO2:  [100 %] 100 %  BP: (109-124)/(59-67) 124/67  Arterial Line BP: ()/(47-76) 139/76     Weight: 77.6 kg (171 lb 1.2 oz)  Body mass index is 27.61 kg/m².    Estimated Creatinine Clearance: 27.3 mL/min (A) (based on SCr of 2.5 mg/dL (H)).     Physical Exam  Vitals and nursing note reviewed.   Constitutional:       General: He is not in acute distress.     Appearance: Normal appearance. He is ill-appearing.   Cardiovascular:      Rate and Rhythm: Normal rate and  regular rhythm.   Abdominal:      Tenderness: There is no guarding or rebound.      Comments: Multiple drains     Two chest tubes on right; one on left.     Significant Labs: CBC:   Recent Labs   Lab 12/01/24  0349 12/01/24  1033 12/02/24  0355 12/02/24  0406   WBC 26.92*  --  21.91*  --    HGB 7.5*  --  6.3*  --    HCT 24.0* 25* 19.7* 20*     --  248  --      Microbiology Results (last 7 days)       Procedure Component Value Units Date/Time    Fungus culture [[id number]]  (Abnormal) Collected: 11/29/24 1539    Order Status: Completed Specimen: Pleural Fluid Updated: 12/02/24 1403     Fungus (Mycology) Culture CANDIDA PARAPSILOSIS  Few      Aerobic culture [[id number]]  (Abnormal) Collected: 11/29/24 1539    Order Status: Completed Specimen: Pleural Fluid Updated: 12/02/24 1316     Aerobic Bacterial Culture PEPPER PARAPSILOSIS  Few        STENOTROPHOMONAS (X.) MALTOPHILIA  Rare  Susceptibility pending      Culture, Respiratory with Gram Stain [[id number]]  (Abnormal) Collected: 12/01/24 0825    Order Status: Completed Specimen: Respiratory from Tracheal Aspirate Updated: 12/02/24 1035     Respiratory Culture GRAM NEGATIVE BAO  Many  Identification and susceptibility pending       Gram Stain (Respiratory) <10 epithelial cells per low power field.     Gram Stain (Respiratory) No WBC's     Gram Stain (Respiratory) Rare  Gram positive rods    Blood culture [[id number]] Collected: 12/01/24 1517    Order Status: Completed Specimen: Blood from Peripheral, Upper Arm, Right Updated: 12/02/24 0115     Blood Culture, Routine No Growth to date    Blood culture [[id number]] Collected: 12/01/24 1518    Order Status: Completed Specimen: Blood from Peripheral, Antecubital, Right Updated: 12/02/24 0115     Blood Culture, Routine No Growth to date    Culture, Anaerobic [[id number]] Collected: 11/29/24 1539    Order Status: Completed Specimen: Pleural Fluid Updated: 11/30/24 1328     Anaerobic Culture Culture in progress     Gram stain [[id number]] Collected: 11/29/24 1539    Order Status: Completed Specimen: Pleural Fluid Updated: 11/29/24 2310     Gram Stain Result Many WBC's      No organisms seen    Blood culture [[id number]] Collected: 11/24/24 1635    Order Status: Completed Specimen: Blood from Peripheral, Upper Arm, Left Updated: 11/29/24 1812     Blood Culture, Routine No growth after 5 days.    Blood culture [[id number]] Collected: 11/24/24 0840    Order Status: Completed Specimen: Blood Updated: 11/29/24 1012     Blood Culture, Routine No growth after 5 days.    Culture, Respiratory with Gram Stain [[id number]]     Order Status: No result Specimen: Respiratory           Pathology Results  (Last 10 years)      None            Significant Imaging: I have reviewed all pertinent imaging results/findings within the past 24 hours.

## 2024-12-02 NOTE — ASSESSMENT & PLAN NOTE
CT concerning for for multifocal PNA. Covered with Kade/Apple/Vanc.    - ID consulted; appreciate recs  - Continue Kade, Apple, and Linezolid   - Daily CBC  - Wean pressors to maintain MAP > 65  -- GNR in respiratory culture, yeast in pleural fluid cx  -- Repeat BCx pending

## 2024-12-02 NOTE — ASSESSMENT & PLAN NOTE
CT scans concerning for loculated pleural effusions; bilateral large-bore chest tubes inserted prior to transfer. Draining to suction.    --IR consulted, drainage of pleural collection 11/29 with CT placed  - CTS consulted; appreciate recs  - Maintain spO2 >90%  - Continue chest tubesx3 to suction

## 2024-12-02 NOTE — SUBJECTIVE & OBJECTIVE
Interval History: Remains quite ill in ICU. Broadly covered with abx. Family at bedside.    Review of Systems   Unable to perform ROS: Intubated     Objective:     Vital Signs (Most Recent):  Temp: 97.8 °F (36.6 °C) (12/02/24 1115)  Pulse: 65 (12/02/24 1400)  Resp: (!) 32 (12/02/24 1400)  BP: 124/67 (12/02/24 0920)  SpO2: 100 % (12/02/24 1400) Vital Signs (24h Range):  Temp:  [97.5 °F (36.4 °C)-98.8 °F (37.1 °C)] 97.8 °F (36.6 °C)  Pulse:  [62-85] 65  Resp:  [16-35] 32  SpO2:  [100 %] 100 %  BP: (109-124)/(59-67) 124/67  Arterial Line BP: ()/(47-76) 139/76     Weight: 77.6 kg (171 lb 1.2 oz)  Body mass index is 27.61 kg/m².    Estimated Creatinine Clearance: 27.3 mL/min (A) (based on SCr of 2.5 mg/dL (H)).     Physical Exam  Vitals and nursing note reviewed.   Constitutional:       General: He is not in acute distress.     Appearance: Normal appearance. He is ill-appearing.   Cardiovascular:      Rate and Rhythm: Normal rate and regular rhythm.   Abdominal:      Tenderness: There is no guarding or rebound.      Comments: Multiple drains     Two chest tubes on right; one on left.     Significant Labs: CBC:   Recent Labs   Lab 12/01/24  0349 12/01/24  1033 12/02/24  0355 12/02/24  0406   WBC 26.92*  --  21.91*  --    HGB 7.5*  --  6.3*  --    HCT 24.0* 25* 19.7* 20*     --  248  --      Microbiology Results (last 7 days)       Procedure Component Value Units Date/Time    Fungus culture [[id number]]  (Abnormal) Collected: 11/29/24 1539    Order Status: Completed Specimen: Pleural Fluid Updated: 12/02/24 1403     Fungus (Mycology) Culture CANDIDA PARAPSILOSIS  Few      Aerobic culture [[id number]]  (Abnormal) Collected: 11/29/24 1539    Order Status: Completed Specimen: Pleural Fluid Updated: 12/02/24 1316     Aerobic Bacterial Culture PEPPER PARAPSILOSIS  Few        STENOTROPHOMONAS (X.) MALTOPHILIA  Rare  Susceptibility pending      Culture, Respiratory with Gram Stain [[id number]]  (Abnormal) Collected:  12/01/24 0825    Order Status: Completed Specimen: Respiratory from Tracheal Aspirate Updated: 12/02/24 1035     Respiratory Culture GRAM NEGATIVE BAO  Many  Identification and susceptibility pending       Gram Stain (Respiratory) <10 epithelial cells per low power field.     Gram Stain (Respiratory) No WBC's     Gram Stain (Respiratory) Rare  Gram positive rods    Blood culture [[id number]] Collected: 12/01/24 1517    Order Status: Completed Specimen: Blood from Peripheral, Upper Arm, Right Updated: 12/02/24 0115     Blood Culture, Routine No Growth to date    Blood culture [[id number]] Collected: 12/01/24 1518    Order Status: Completed Specimen: Blood from Peripheral, Antecubital, Right Updated: 12/02/24 0115     Blood Culture, Routine No Growth to date    Culture, Anaerobic [[id number]] Collected: 11/29/24 1539    Order Status: Completed Specimen: Pleural Fluid Updated: 11/30/24 1328     Anaerobic Culture Culture in progress    Gram stain [[id number]] Collected: 11/29/24 1539    Order Status: Completed Specimen: Pleural Fluid Updated: 11/29/24 2310     Gram Stain Result Many WBC's      No organisms seen    Blood culture [[id number]] Collected: 11/24/24 1635    Order Status: Completed Specimen: Blood from Peripheral, Upper Arm, Left Updated: 11/29/24 1812     Blood Culture, Routine No growth after 5 days.    Blood culture [[id number]] Collected: 11/24/24 0840    Order Status: Completed Specimen: Blood Updated: 11/29/24 1012     Blood Culture, Routine No growth after 5 days.    Culture, Respiratory with Gram Stain [[id number]]     Order Status: No result Specimen: Respiratory           Pathology Results  (Last 10 years)      None            Significant Imaging: I have reviewed all pertinent imaging results/findings within the past 24 hours.

## 2024-12-02 NOTE — CONSULTS
[facility] - Medical ICU  Palliative Medicine  Consult Note    Patient Name: Jose Hernández Sr.  MRN: [id number]  Admission Date: 11/23/2024  Hospital Length of Stay: 9 days  Code Status: Full Code   Attending Provider: Eduardo Garber MD  Consulting Provider: EDMAR Poole  Primary Care Physician: Tong Cantrell MD  Principal Problem:Esophageal perforation    Patient information was obtained from relative(s) and primary team.      Inpatient consult to Palliative Care  Consult performed by: Lary De La O CNS  Consult ordered by: Kathryn Moreno DNP        Assessment/Plan:     Palliative Care  Palliative care encounter  Impression: Pt is a  68 y/o male with PMHx of alcohol use disorder, chronic back pain 2/2 to spinal stenosis, HLD, HTN, and CVA (on plavix) with no residual deficits who presented to OSH on 11/10/24 with epigastric pain and melena. Pt had esophageal perforation with complicated course. Pt is intubated on pressor support, septic shock, ARF, EF15-20%. Pt is sedated.  Pt is full code.     Reason for consult: GOC/ACP. Spoke to MICHELLE Das.     Goals of care/ACP:   Met with one of pt's daughters and granddaughter. Pt has 2 daughters, one son, and 16 grandchildren. Per pt's daughter, she and her sister are aware of severity of pt's illness and poor prognosis. Per daughter, pt was an active man prior to hospitalization and this woul dnot be a good QOL for pt. Per daughter and granddaughter, pt's son having a hard time dealing with pt being in hospital and wants to continue with treatment. Per pt's daughter, daughters are thinking about comfort care but want pt's son to be accepting of this to. Per pt's daughter, she is not sure if he will ever be. Comfort care discussed. Code status discussed. Per daughter, son wants pt to remain full code. At this time, plan is to continue medical management.     Code status: Full  MPOA: Pt's two daughters and son.     Symptom management:    Pain:   Pt on fentanyl drip.     No distress noted.     Respiratory failure:   Pt intubated, on sedation.     Plan:   Will continue to follow.                   Thank you for your consult. I will follow-up with patient. Please contact us if you have any additional questions.    Subjective:     HPI:   Pt is a 68 yo M with a PMHx of alcohol use disorder, chronic back pain 2/2 to spinal stenosis, HLD, HTN, and CVA (on plavix) with no residual deficits who presented to OSH on 11/10/24 with epigastric pain and melena; found to have an esophageal perforation. Per chart review, the patient was initally hypotensive, anemic to 5.7, hyperbilirubinemia, lactic acidosis to 9.9. CTAP showed a pneumoperitoneum and pneumomediastinum consistent with a perforated viscus, hiatal hernia with surrounding external gas, and loculated pleural effusions. He was transfused and started on octreotide gtt, protonix, ativan, doxy, zosyn, hannah, vanc, precedex, with AC held. For the loculated pleural effusions, bilateral chest tubes were placed. Diagnostic laparoscopy showed hiatal hernia with gangrene in the lower thoracic esophagus and a perforated esophageal ulcer in the lower thoracic area, and mediastinal fluid pus, food, and pills. He underwent surgical repair on 11/10/24. Additionally, he was found to have HFrEF (EF = 20%).The patient was later extubated but then developed AMS. They repeated imaging with extraluminal air adjacent to the GE junction with increased fluid in the lower posterior mediastium concerning for recurrent esophageal perforation.   On 11/23/24, the patient was transferred to [facility] for AES and CTS consults s/t concern for perforated esophagus and loculated pleural effusions     Per chart review from OSH the patient stopped taking his medications a month before presentation because he thought they were making him weak. Other ROS positive for paleness for 3 weeks prior to presentation. The patient provided supplemental  history and reported that the patient was not eating or drinking except EtOH, drinks 2-3 bottles of wine and 2-4 beers per day.    Pt is intubated, on pressor support.     Hospital Course:  No notes on file    Interval History: Pt intubated on sedation    Past Medical History:   Diagnosis Date    Arthritis     Gout     Hyperlipemia     Hypertension     Stroke 2012       Past Surgical History:   Procedure Laterality Date    ESOPHAGOGASTRODUODENOSCOPY N/A 11/25/2024    Procedure: EGD (ESOPHAGOGASTRODUODENOSCOPY);  Surgeon: Som Balbuena MD;  Location: [address]);  Service: Endoscopy;  Laterality: N/A;    HAND SURGERY      SKIN GRAFT         Review of patient's allergies indicates:  No Known Allergies    Medications:  Continuous Infusions:   D10W   Intravenous Continuous PRN        fentanyl  0-250 mcg/hr Intravenous Continuous 20 mL/hr at 12/02/24 0800 200 mcg/hr at 12/02/24 0800    NORepinephrine bitartrate-D5W  0-3 mcg/kg/min Intravenous Continuous 4.4 mL/hr at 12/02/24 0800 0.12 mcg/kg/min at 12/02/24 0800    propofoL  0-50 mcg/kg/min Intravenous Continuous 14 mL/hr at 12/02/24 0800 30 mcg/kg/min at 12/02/24 0800    TPN ADULT CENTRAL LINE CUSTOM   Intravenous Continuous 45 mL/hr at 12/02/24 0800 Rate Verify at 12/02/24 0800    TPN ADULT CENTRAL LINE CUSTOM   Intravenous Continuous        vasopressin  0.04 Units/min Intravenous Continuous 12 mL/hr at 12/02/24 0955 0.04 Units/min at 12/02/24 0955     Scheduled Meds:   heparin (porcine)  5,000 Units Subcutaneous Q8H    hydrocortisone sodium succinate  100 mg Intravenous Q8H    linezolid  600 mg Intravenous Q12H    meropenem IV (PEDS and ADULTS)  2 g Intravenous Q12H    micafungin  100 mg Intravenous Q24H    mupirocin   Nasal BID    pantoprazole  40 mg Intravenous BID     PRN Meds:  Current Facility-Administered Medications:     0.9%  NaCl infusion (for blood administration), , Intravenous, Q24H PRN    acetaminophen, 650 mg, Rectal, Q6H PRN    D10W, ,  Intravenous, Continuous PRN    dextrose 10%, 12.5 g, Intravenous, PRN    dextrose 10%, 25 g, Intravenous, PRN    fentanyl, 50 mcg, Intravenous, Q30 Min PRN    glucagon (human recombinant), 1 mg, Intramuscular, PRN    HYDROmorphone, 0.5 mg, Intravenous, Q6H PRN    insulin aspart U-100, 0-5 Units, Subcutaneous, Q4H PRN    iohexol, 15 mL, Oral, PRN    sodium chloride 0.9%, 10 mL, Intravenous, PRN    Family History       Problem Relation (Age of Onset)    Hypertension Father    No Known Problems Mother          Tobacco Use    Smoking status: Never    Smokeless tobacco: Never   Substance and Sexual Activity    Alcohol use: Yes    Drug use: Yes     Types: Marijuana    Sexual activity: Yes       Review of Systems   Unable to perform ROS: Acuity of condition     Objective:     Vital Signs (Most Recent):  Temp: 98.6 °F (37 °C) (12/02/24 0920)  Pulse: 70 (12/02/24 0853)  Resp: (!) 32 (12/02/24 0853)  BP: 124/67 (12/02/24 0920)  SpO2: 100 % (12/02/24 0853) Vital Signs (24h Range):  Temp:  [97.5 °F (36.4 °C)-100.1 °F (37.8 °C)] 98.6 °F (37 °C)  Pulse:  [70-88] 70  Resp:  [16-35] 32  SpO2:  [100 %] 100 %  BP: (109-124)/(59-67) 124/67  Arterial Line BP: ()/(47-70) 109/59     Weight: 77.6 kg (171 lb 1.2 oz)  Body mass index is 27.61 kg/m².       Physical Exam  Constitutional:       Interventions: He is intubated.      Comments: On sedation, intubated   HENT:      Head: Normocephalic and atraumatic.   Cardiovascular:      Comments: On pressor support  Pulmonary:      Effort: He is intubated.   Skin:     General: Skin is warm and dry.   Neurological:      Comments: On sedation            Review of Symptoms      Symptom Assessment (ESAS 0-10 Scale)  Pain:  0  Dyspnea:  0  Anxiety:  0  Nausea:  0  Depression:  0  Anorexia:  0  Fatigue:  0  Insomnia:  0  Restlessness:  0  Agitation:  0         Performance Status:  10    Psychosocial/Cultural:   See Palliative Psychosocial Note: No  Pt has two daughters and one son. Pt has 16  grandchildren. Pt and son live in [place]. Pt's daughters live in [facility] [place].  Pt was drinking prior to hospitalization. 3 bottles of wine and 4 cans of beer per day.   **Primary  to Follow**  Palliative Care  Consult: Yes    Spiritual:  F - Monserrat and Belief:  Voodoo  A - Address in Care:  Will ask chaplain Jeannette to visit.         Advance Care Planning  Advance Directives:   Living Will: No    Do Not Resuscitate Status: No    Medical Power of : No    Agent's Name:  Pt's two daughters and son at bedside.    Decision Making:  Patient answered questions  Goals of Care: The family endorses that what is most important right now is to focus on continuing treatment at this time,     Accordingly, we have decided that the best plan to meet the patient's goals includes continuing with treatment         Significant Labs: All pertinent labs within the past 24 hours have been reviewed.  CBC:   Recent Labs   Lab 12/02/24  0355 12/02/24  0406   WBC 21.91*  --    HGB 6.3*  --    HCT 19.7* 20*   MCV 82  --      --      BMP:  Recent Labs   Lab 12/02/24  0355   *   *   K 4.2      CO2 22*   BUN 76*   CREATININE 2.5*   CALCIUM 8.4*   MG 2.2     LFT:  Lab Results   Component Value Date    AST 79 (H) 12/02/2024    ALKPHOS 169 (H) 12/02/2024    BILITOT 2.2 (H) 12/02/2024     Albumin:   Albumin   Date Value Ref Range Status   12/02/2024 1.0 (L) 3.5 - 5.2 g/dL Final     Protein:   Total Protein   Date Value Ref Range Status   12/02/2024 7.0 6.0 - 8.4 g/dL Final     Lactic acid:   Lab Results   Component Value Date    LACTATE 0.8 12/01/2024    LACTATE 1.0 11/27/2024       Significant Imaging: I have reviewed all pertinent imaging results/findings within the past 24 hours.    25 minutes of ACP comlpeted.     Lary De La O, CNS  Palliative Medicine  [facility] - Medical ICU

## 2024-12-02 NOTE — ASSESSMENT & PLAN NOTE
Impression: Pt is a  70 y/o male with PMHx of alcohol use disorder, chronic back pain 2/2 to spinal stenosis, HLD, HTN, and CVA (on plavix) with no residual deficits who presented to OSH on 11/10/24 with epigastric pain and melena. Pt had esophageal perforation with complicated course. Pt is intubated on pressor support, septic shock, ARF, EF15-20%. Pt is sedated.  Pt is full code.     Reason for consult: GOC/ACP. Spoke to MICHELLE Das.     Goals of care/ACP:   Met with one of pt's daughters and granddaughter. Pt has 2 daughters, one son, and 16 grandchildren. Per pt's daughter, she and her sister are aware of severity of pt's illness and poor prognosis. Per daughter, pt was an active man prior to hospitalization and this woul dnot be a good QOL for pt. Per daughter and granddaughter, pt's son having a hard time dealing with pt being in hospital and wants to continue with treatment. Per pt's daughter, daughters are thinking about comfort care but want pt's son to be accepting of this to. Per pt's daughter, she is not sure if he will ever be. Comfort care discussed. Code status discussed. Per daughter, son wants pt to remain full code. At this time, plan is to continue medical management.     Code status: Full  MPOA: Pt's two daughters and son.     Symptom management:   Pain:   Pt on fentanyl drip.     No distress noted.     Respiratory failure:   Pt intubated, on sedation.     Plan:   Will continue to follow.

## 2024-12-02 NOTE — TELEPHONE ENCOUNTER
Patient is currently admitted into hospital. Will contact to schedule outpatient endoscopy procedure once discharged.

## 2024-12-02 NOTE — ASSESSMENT & PLAN NOTE
Patient with Hypoxic Respiratory failure which is Acute.  he is not on home oxygen. Supplemental oxygen was provided and noted- Vent Mode: A/C  Oxygen Concentration (%):  [70-80] 70  Resp Rate Total:  [32 br/min] 32 br/min  PEEP/CPAP:  [10 cmH20] 10 cmH20  Mean Airway Pressure:  [20 cmH20] 20 cmH20  .   Signs/symptoms of respiratory failure include- tachypnea, increased work of breathing, and respiratory distress. Contributing diagnoses includes - Aspiration, CHF, Pleural effusion, and Pneumonia Labs and images were reviewed. Patient Has recent ABG, which has been reviewed. Will treat underlying causes and adjust management of respiratory failure as follows-     Overnight with increasing O2 requirements up to 10L HF NC. Increased WOB. Coarse breath sounds upon auscultation. Most recent chest xray with worsening patchy areas of alveolar consolidation. BNP 1484. Likely fluid volume overload vs. Aspiration. CT scan concerning for ARDS    - Duonebs for wheezing  - Maintains on BSA; linezolid/wilda/hannah  - Sputum culture NGTD  - Lung protective ventilation  - Wean oxygen for SpO2 >90%  - Daily SAT/SBT  - ABG/VBG prn

## 2024-12-02 NOTE — PROGRESS NOTES
[facility] - Medical ICU  Critical Care Medicine  Progress Note    Patient Name: Jose Hernández Sr.  MRN: [id number]  Admission Date: 11/23/2024  Hospital Length of Stay: 9 days  Code Status: Full Code  Attending Provider: Eduardo Garber MD  Primary Care Provider: Tong Cantrell MD   Principal Problem: Esophageal perforation    Subjective:     HPI:  Mr. Hernández is a 69yoM with a PMHx of alcohol use disorder, chronic back pain 2/2 to spinal stenosis, HLD, HTN, and CVA (on plavix) with no residual deficits who presented to OSH on 11/10/24 with epigastric pain and melena; found to have an esophageal perforation. The patient was initally hypotensive, anemic to 5.7, hyperbilirubinemia, lactic acidosis to 9.9. CTAP showed a pneumoperitoneum and pneumomediastinum consistent with a perforated viscus, hiatal hernia with surrounding external gas, and loculated pleural effusions. He was transfused and started on octreotide gtt, protonix, ativan, doxy, zosyn, hannah, vanc, precedex, with AC held. For the loculated pleural effusions, bilateral chest tubes were placed. Diagnostic laparoscopy showed hiatal hernia with gangrene in the lower thoracic esophagus and a perforated esophageal ulcer in the lower thoracic area, and mediastinal fluid pus, food, and pills. He underwent surgical repair on 11/10/24. Additionally, he was found to have HFrEF (EF = 20%).The patient was later extubated but then developed AMS. They repeated imaging with extraluminal air adjacent to the GE junction with increased fluid in the lower posterior mediastium concerning for recurrent esophageal perforation.   On 11/23/24, the patient was transferred to [facility] for AES and CTS consults s/t concern for perforated esophagus and loculated pleural effusions    Per chart review from OSH the patient stopped taking his medications a month before presentation because he thought they were making him weak. Other ROS positive for paleness for 3 weeks prior to  presentation. The patient provided supplemental history and reported that the patient was not eating or drinking except EtOH, drinks 2-3 bottles of wine and 2-4 beers per day.    Hospital/ICU Course:  Mr. Hernández is a 69yoM with PMHx significant for alcohol use disorder, HLD, HTN, and CVA (on plavix) who presented as a transfer for perforated esophageal ulcer. Bilateral chest tubes and bilateral KENNY drains. CT Chest/Abd/Pelvis: Esophageal leak into the paraesophageal posterior mediastinum and likely bilateral pleural spaces. Multifocal ground-glass opacities concerning for acute infectious or inflammatory process.  Small basilar pleural effusions and mild right lower lobe consolidation. ID, Thoracic Surgery, AES consulted. On 11/25, AES successfully placed an esophageal stent. Long-term nutrition will need to be addressed. Overnight with increased WOB and intermittent hypoxia. Chest xray with worsening patchy areas of alveolar consolidation; BNP 1484 - responsive to lasix.Intubated 11/27. CT concerning for ARDS. On vasopressors. CT chest and abdomen with contrast completed without evidence of active esophageal leak. Patient now on two pressors with high ventilator requirements. Palliative care consulted for assistance with ongoing GOC discussions with family.     Interval History/Significant Events: Coming down steadily on levophed infusion. Yeast growing in pleural fluid cultures. Ongoing GOC with family.     Review of Systems   Unable to perform ROS: Intubated     Objective:     Vital Signs (Most Recent):  Temp: 97.8 °F (36.6 °C) (12/02/24 1115)  Pulse: 65 (12/02/24 1400)  Resp: (!) 32 (12/02/24 1400)  BP: 124/67 (12/02/24 0920)  SpO2: 100 % (12/02/24 1400) Vital Signs (24h Range):  Temp:  [97.5 °F (36.4 °C)-98.8 °F (37.1 °C)] 97.8 °F (36.6 °C)  Pulse:  [62-82] 65  Resp:  [16-35] 32  SpO2:  [100 %] 100 %  BP: (109-124)/(59-67) 124/67  Arterial Line BP: ()/(47-76) 139/76   Weight: 77.6 kg (171 lb 1.2 oz)  Body  mass index is 27.61 kg/m².      Intake/Output Summary (Last 24 hours) at 12/2/2024 1602  Last data filed at 12/2/2024 1315  Gross per 24 hour   Intake 4348.95 ml   Output 3310 ml   Net 1038.95 ml          Physical Exam  Constitutional:       Appearance: He is ill-appearing and toxic-appearing.      Interventions: He is sedated and intubated.   HENT:      Mouth/Throat:      Mouth: Mucous membranes are dry.   Eyes:      Pupils: Pupils are equal, round, and reactive to light.   Cardiovascular:      Rate and Rhythm: Normal rate and regular rhythm.      Pulses: Normal pulses.      Heart sounds: Normal heart sounds.   Pulmonary:      Effort: He is intubated.      Comments: Intubated on mechanical ventilation  Chest tubes x3 to suction.  Abdominal:      General: Bowel sounds are normal.      Palpations: Abdomen is soft.      Comments: KENNY drain x 2   Genitourinary:     Comments: Johansen catheter  Musculoskeletal:         General: Normal range of motion.   Skin:     General: Skin is warm and dry.      Coloration: Skin is pale.            Vents:  Vent Mode: A/C (12/02/24 1255)  Ventilator Initiated: Yes (11/27/24 1522)  Set Rate: 32 BPM (12/02/24 1255)  Vt Set: 385 mL (11/27/24 1522)  PEEP/CPAP: 10 cmH20 (12/02/24 1255)  Oxygen Concentration (%): 70 (12/02/24 1400)  Peak Airway Pressure: 37 cmH20 (12/02/24 1255)  Plateau Pressure: 34 cmH20 (12/02/24 1255)  Total Ve: 10.7 L/m (12/02/24 1255)  F/VT Ratio<105 (RSBI): (!) 95.81 (12/02/24 1255)  Lines/Drains/Airways       Central Venous Catheter Line  Duration             Percutaneous Central Line - Triple Lumen    Internal Jugular Right -- days              Drain  Duration                  Chest Tube   Left Midaxillary -- days         Chest Tube   Right Midaxillary -- days         Closed/Suction Drain   Right Abdomen Bulb -- days         Closed/Suction Drain  Left Abdomen Bulb -- days         Urethral Catheter 11/23/24 1313 Temperature probe 9 days         Chest Tube 11/29/24 1534  Tube - 3 Right Midaxillary;Pleural 14 Fr. 3 days         NG/OG Tube 11/30/24 1100 14 Fr. Left nostril 2 days              Airway  Duration                  Airway - Non-Surgical 11/27/24 1520 Endotracheal Tube 5 days              Arterial Line  Duration             Arterial Line 11/27/24 1810 Left Radial 4 days              Peripheral Intravenous Line  Duration                  Peripheral IV - Single Lumen 12/01/24 0400 20 G Anterior;Left Upper Arm 1 day         Peripheral IV - Single Lumen 12/01/24 1603 18 G 1 3/4 in No Anterior;Right Forearm <1 day                  Significant Labs:    CBC/Anemia Profile:  Recent Labs   Lab 12/01/24  0349 12/01/24  1033 12/02/24  0355 12/02/24  0406   WBC 26.92*  --  21.91*  --    HGB 7.5*  --  6.3*  --    HCT 24.0* 25* 19.7* 20*     --  248  --    MCV 85  --  82  --    RDW 22.8*  --  22.7*  --         Chemistries:  Recent Labs   Lab 12/01/24  0349 12/01/24  0858 12/01/24  2337 12/02/24  0355 12/02/24  1314   *   < > 134* 135* 137   K 5.7*   < > 4.6 4.2 3.2*      < > 101 101 101   CO2 24   < > 22* 22* 23   BUN 64*   < > 70* 76* 79*   CREATININE 2.2*   < > 2.5* 2.5* 2.3*   CALCIUM 8.5*   < > 8.6* 8.4* 8.1*   ALBUMIN 1.1*  --   --  1.0*  --    PROT 7.0  --   --  7.0  --    BILITOT 2.6*  --   --  2.2*  --    ALKPHOS 204*  --   --  169*  --    ALT 20  --   --  23  --    AST 61*  --   --  79*  --    MG 2.2  --   --  2.2  --    PHOS 5.8*  --   --  7.5*  --     < > = values in this interval not displayed.       All pertinent labs within the past 24 hours have been reviewed.    Significant Imaging:  I have reviewed all pertinent imaging results/findings within the past 24 hours.    ABG  Recent Labs   Lab 12/02/24  0406   PH 7.321*   PO2 177*   PCO2 54.9*   HCO3 28.4*   BE 2     Assessment/Plan:     Neuro  History of CVA (cerebrovascular accident)  CVA in 2012. No residual deficits.     - Neuro checks    Psychiatric  Alcohol use disorder  Per chart review, drinks 2-3  bottles of wine and 3-4 beers per day. Last drink around 11/8/24. Treated with Ativan at OSH.     - Liver ultrasound; without signs of portal HTN  - Monitor for s/s of withdrawal  - Seizure precautions  - Continue folic acid and thiamine    Pulmonary  Acute hypoxemic respiratory failure  Patient with Hypoxic Respiratory failure which is Acute.  he is not on home oxygen. Supplemental oxygen was provided and noted- Vent Mode: A/C  Oxygen Concentration (%):  [70-80] 70  Resp Rate Total:  [32 br/min] 32 br/min  PEEP/CPAP:  [10 cmH20] 10 cmH20  Mean Airway Pressure:  [20 cmH20] 20 cmH20  .   Signs/symptoms of respiratory failure include- tachypnea, increased work of breathing, and respiratory distress. Contributing diagnoses includes - Aspiration, CHF, Pleural effusion, and Pneumonia Labs and images were reviewed. Patient Has recent ABG, which has been reviewed. Will treat underlying causes and adjust management of respiratory failure as follows-     Overnight with increasing O2 requirements up to 10L HF NC. Increased WOB. Coarse breath sounds upon auscultation. Most recent chest xray with worsening patchy areas of alveolar consolidation. BNP 1484. Likely fluid volume overload vs. Aspiration. CT scan concerning for ARDS    - Duonebs for wheezing  - Maintains on BSA; linezolid/wilda/hannah  - Sputum culture NGTD  - Lung protective ventilation  - Wean oxygen for SpO2 >90%  - Daily SAT/SBT  - ABG/VBG prn    Loculated pleural effusion  CT scans concerning for loculated pleural effusions; bilateral large-bore chest tubes inserted prior to transfer. Draining to suction.    --IR consulted, drainage of pleural collection 11/29 with CT placed  - CTS consulted; appreciate recs  - Maintain spO2 >90%  - Continue chest tubesx3 to suction    Cardiac/Vascular  Acute systolic heart failure  Reported EF of 15-20% at outside facility.  Hemodynamically stable.  Echo 11/25/24:    Left Ventricle: normal in size. Ventricular mass is normal.  Normal wall thickness. There is concentric remodeling. Regional wall motion abnormalities present. Septal motion is abnormal. There is low normal systolic function with a visually estimated ejection fraction of 50 - 55%. There is indeterminate diastolic function.    Right Ventricle: Normal size and function    Left Atrium: Normal left atrial size.    Right Atrium: Normal - upper limits of normal right atrial size.    Aortic Valve: The aortic valve is a trileaflet valve. There is moderate aortic valve sclerosis. Aortic valve peak velocity is 1.4 m/s. Mean gradient is 5.9 mmHg.    Mitral Valve: The mitral valve is structurally normal. There is no significant regurgitation.    Tricuspid Valve: The tricuspid valve is structurally normal.    Pulmonic Valve: The pulmonic valve is structurally normal. There is no significant regurgitation.    Pulmonary Artery: The estimated pulmonary artery systolic pressure is 35 mmHg.    IVC/SVC: Normal venous pressure at 3 mmHg.    - Strict I/Os  - PRN Lasix dosing  - Repeat ECHO    Hyperlipidemia  Takes statin at home; holding in the setting of previous transaminitis.    - Daily CMP  - Resume medications as appropriate    Essential hypertension  History of HTN, had stopped taking home medications about a month prior to admission at OSH.    - Holding home meds in setting of two pressor shock    Renal/  Hyperkalemia  Resolved    ALFREDO (acute kidney injury)  Admit creatinine 1.5. Baseline Cr: 0.9.    - Strict I/Os  - Daily CMP  - Avoid nephrotoxic agents  - Renally dose medications  - Hypernatremic; likely s/t volume depletion with dehydration. Continue D5W  - MAP > 65 and Hgb > 7 goal     ID  Septic shock  CT concerning for for multifocal PNA. Covered with Kade/Apple/Vanc.    - ID consulted; appreciate recs  - Continue Kade, Apple, and Linezolid   - Daily CBC  - Wean pressors to maintain MAP > 65  -- GNR in respiratory culture, yeast in pleural fluid cx  -- Repeat BCx pending    GI  *  Esophageal perforation  11/10/24: CT abdomen showed evidence of pneumomediastinum, pneumoperitoneum all concerning for perforated viscus.   - Robotic assisted laparoscopy performed. 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 Sicard).   - General surgery performed EGD immediately following laparoscopy. Report no evidence of intra-esophageal leakage. Bilateral KENNY drains placed.    11/23/24: Pt. Transferred to [facility] for higher level of care and AES/CTS consults.    - CTS consulted; appreciate recs. They feel he is too frail to undergo surgical management  - AES consulted; appreciate recs. Esophageal stent placed successfully on 11/25  - Repeat Esophogram completed 11/30 with no obvious large leak  - Continue protonix BID  - Monitor for bleeding; trend CBCs  - Transfuse for hgb <7  - Monitor KENNY drains    Palliative Care  Poor prognosis  -- Continues to decompensate on appropriate medical therapies  -- S/P multiple invasive interventions  -- Palliative care consulted, appreciate assistance       Critical Care Daily Checklist:    A: Awake: RASS Goal/Actual Goal: RASS Goal: -2-->light sedation  Actual:     B: Spontaneous Breathing Trial Performed?    C: SAT & SBT Coordinated?                        D: Delirium: CAM-ICU Overall CAM-ICU: Positive   E: Early Mobility Performed? No   F: Feeding Goal: Goals: Meet % EEN, EPN by RD f/u date  Status: Nutrition Goal Status: new   Current Diet Order   Procedures    Diet NPO      AS: Analgesia/Sedation Fentanyl and propofol infusion   T: Thromboembolic Prophylaxis Heparin subcutaneous    H: HOB > 300 Yes   U: Stress Ulcer Prophylaxis (if needed) Protonix   G: Glucose Control SSI   B: Bowel Function Stool Occurrence: 1   I: Indwelling Catheter (Lines & Johansen) Necessity CVC, [place], CTx3, KENNY drain x2, OG, Johansen   D: De-escalation of Antimicrobials/Pharmacotherapies No    Plan for the day/ETD  Follow Cx data, continue Abx  Wean pressors as tolerated   Ongoing GOC   TPN on hold due to + yeast cx of pleural space. Follow 12/1 BCx    Code Status:  Family/Goals of Care: Full Code  Daughters updated at bedside     Critical Care Time: 70 minutes  Critical secondary to Patient has a condition that poses threat to life and bodily function: Esophageal perforation, septic shock      Critical care was time spent personally by me on the following activities: development of treatment plan with patient or surrogate and bedside caregivers, discussions with consultants, evaluation of patient's response to treatment, examination of patient, ordering and performing treatments and interventions, ordering and review of laboratory studies, ordering and review of radiographic studies, pulse oximetry, re-evaluation of patient's condition. This critical care time did not overlap with that of any other provider or involve time for any procedures.     Kathryn Moreno, EDEL  Critical Care Medicine  [facility] - Medical ICU

## 2024-12-02 NOTE — ASSESSMENT & PLAN NOTE
11/10/24: CT abdomen showed evidence of pneumomediastinum, pneumoperitoneum all concerning for perforated viscus.   - Robotic assisted laparoscopy performed. 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 Sicard).   - General surgery performed EGD immediately following laparoscopy. Report no evidence of intra-esophageal leakage. Bilateral KENNY drains placed.    11/23/24: Pt. Transferred to [facility] for higher level of care and AES/CTS consults.    - CTS consulted; appreciate recs. They feel he is too frail to undergo surgical management  - AES consulted; appreciate recs. Esophageal stent placed successfully on 11/25  - Repeat Esophogram completed 11/30 with no obvious large leak  - Continue protonix BID  - Monitor for bleeding; trend CBCs  - Transfuse for hgb <7  - Monitor KENNY drains

## 2024-12-02 NOTE — CHAPLAIN
"Palliative Care        Patient: Jose Hernández Sr.  MRN: [id number]  : 1955  Age: 69 y.o.  Hospital Length of Stay: 9  Code Status: Code Status Discussion Note  Attending Provider: Eduardo Garber MD  Principal Problem: Esophageal perforation     What prompted this initial visit?:   Visited [facility] pt as part of pall [facility] grounding; 15 min     Who was present during my visit:   One daughter, Whit, bedside     Non-clinical observations of patient/family or room:  Pt intubated, sedated/sleeping; did not appear to be in distress     Feelings/Family/Monserrat (Emotions/Fears/Experiences/Coping):   Unable to assess of pt.  Whit shared stories about her father; provided listening ear and compassionate presence; she said he loves being outdoors-- cutting grass, gardening, anything outside; Daughter shared that their mother  10 years ago-- she has brother and sister as noted in other case notes. Whit claims "to be the stronger one; siblings having a hard time." She confirms what [facility] provider stated that the two daughters are on the same page of not wanting to prolong death, but the brother is not there yet.     Whit, pt and family are Yarsanism and she mentioned "..it's God's will.." several times. Together we talked about the tension between the miracles of modern medicine with how long we can keep bodies going, combined with understanding and believing in God's will for our lives. Whit states that part of her wants to wait until "God stops his heart". Normalized this feeling, reminding her that human inventions may be stopping that from happening. Together we talked about how hard it is to straddle both thoughts. Overall she said her dad would not want to be like this.  She is in some spiritual distress, but working through it. Mostly it's about getting all siblings to consensus of next steps. This is important to her.     Future (Spiritual Care Plan of Action):   Palliative "  will continue to follow. , in your mercy.        To [facility]:   Educated the patient/family regarding the services offered by the Spiritual Care team in my absence as the specialized Palliative Care  ([id number])  My hours are 7:30a-4:00p M-F, but Spiritual Care Chaplains are available 24/7.  I'm usually the first point of contact for palliative care patients, but any  is able to help.  Evening, overnight and weekends, please dial [phone] which is carried by an in-house Spiritual Care  at all hours.         Rev Jason. Jeannette Rader MDiv, [facility]  Board Certified Palliative Care   Palliative Medicine Department, 9th Floor Clinic Tower [facility] [place] [facility]     My SpectraLink: [email or web address]   Office: [phone]  ** If you call and I don't answer, please leave a voicemail because vmail is forwarded to me  Email: [email or web address]  Work hours: M-F [phone]     The care I provide is shaped by the Code of Ethics of the [employer] Professional Chaplains

## 2024-12-02 NOTE — ASSESSMENT & PLAN NOTE
Reported EF of 15-20% at outside facility.  Hemodynamically stable.  Echo 11/25/24:    Left Ventricle: normal in size. Ventricular mass is normal. Normal wall thickness. There is concentric remodeling. Regional wall motion abnormalities present. Septal motion is abnormal. There is low normal systolic function with a visually estimated ejection fraction of 50 - 55%. There is indeterminate diastolic function.    Right Ventricle: Normal size and function    Left Atrium: Normal left atrial size.    Right Atrium: Normal - upper limits of normal right atrial size.    Aortic Valve: The aortic valve is a trileaflet valve. There is moderate aortic valve sclerosis. Aortic valve peak velocity is 1.4 m/s. Mean gradient is 5.9 mmHg.    Mitral Valve: The mitral valve is structurally normal. There is no significant regurgitation.    Tricuspid Valve: The tricuspid valve is structurally normal.    Pulmonic Valve: The pulmonic valve is structurally normal. There is no significant regurgitation.    Pulmonary Artery: The estimated pulmonary artery systolic pressure is 35 mmHg.    IVC/SVC: Normal venous pressure at 3 mmHg.    - Strict I/Os  - PRN Lasix dosing  - Repeat ECHO

## 2024-12-02 NOTE — HPI
Pt is a 70 yo M with a PMHx of alcohol use disorder, chronic back pain 2/2 to spinal stenosis, HLD, HTN, and CVA (on plavix) with no residual deficits who presented to OSH on 11/10/24 with epigastric pain and melena; found to have an esophageal perforation. Per chart review, the patient was initally hypotensive, anemic to 5.7, hyperbilirubinemia, lactic acidosis to 9.9. CTAP showed a pneumoperitoneum and pneumomediastinum consistent with a perforated viscus, hiatal hernia with surrounding external gas, and loculated pleural effusions. He was transfused and started on octreotide gtt, protonix, ativan, doxy, zosyn, hannah, vanc, precedex, with AC held. For the loculated pleural effusions, bilateral chest tubes were placed. Diagnostic laparoscopy showed hiatal hernia with gangrene in the lower thoracic esophagus and a perforated esophageal ulcer in the lower thoracic area, and mediastinal fluid pus, food, and pills. He underwent surgical repair on 11/10/24. Additionally, he was found to have HFrEF (EF = 20%).The patient was later extubated but then developed AMS. They repeated imaging with extraluminal air adjacent to the GE junction with increased fluid in the lower posterior mediastium concerning for recurrent esophageal perforation.   On 11/23/24, the patient was transferred to [facility] for AES and CTS consults s/t concern for perforated esophagus and loculated pleural effusions     Per chart review from OSH the patient stopped taking his medications a month before presentation because he thought they were making him weak. Other ROS positive for paleness for 3 weeks prior to presentation. The patient provided supplemental history and reported that the patient was not eating or drinking except EtOH, drinks 2-3 bottles of wine and 2-4 beers per day.    Pt is intubated, on pressor support.

## 2024-12-02 NOTE — ASSESSMENT & PLAN NOTE
Jose Hernández is a 69 year old man with alcohol use disorder, CVA, cdiff (2021) and hypertension transferred from [facility] for esophageal perforation. Found to have pneumoperitoneum/pneumomediastinum on imaging concerning for perforated viscus and loculated pleural effusion s/p robotic lap on 11/10. Had gangrenous thoracic esophagus/hiatal hernia, esophageal ulcer perforation that was repaired with omental patch (op note significant large intraabdominal/mediastinal pus with food stuffs/pills removed from mediastinum, s/p EGD with reported no evidence of intra-esophageal leak, no OR culture sent. Prior hospital course notable for shock, respiratry failure s/p intubation now extubated. Repeat CT c/w esophageal leak. Labs notable for persistent leukocytosis. Blood cultures at [facility] and here are negative. Had esophageal stent placement 11/25 with plan for esophagram on 11/29. Respiratory status worsened requiring intubation on 11/27. Repeat CT chest 11/27 demonstrated that right chest tube is no longer in the R loculated fluid collection and persistence of perigastric fluid collection. Perigastric fluid collection not accessible for drainage, but went for IR drainage of R sided pleural fluid collection. New fevers and worsening respiratory status 12/1, repeat cultures obtained.      Tracheal aspirate growing a GNB (previously Steno) while pleural fluid is growing Candida parapsilosis and Stenotrophomonas.    Recommendations:  - continue meropenem 2g q12 (renal dosing), micafungin 100 mg q24 hours, and Zyvox  - if no improvement, consider adding eravacycline for Stenotrophomonas (assuming you would like to avoid Bactrim, the DOC)  - f/u cultures

## 2024-12-02 NOTE — SUBJECTIVE & OBJECTIVE
Subjective:   HPI: Jose Hernández Sr. is a 68 yo M w/ CHF (EF 20%), chronic NSAID use, alcohol dependence, spinal stenosis, CVA who was admitted to outside hospital on 11/10/2024 with epigastric pain, nausea, vomiting, diarrhea, melena.  He was found to be anemic with a hemoglobin of 5.7, and vitals with blood pressure 70s over 40s, pulse 110 and a lactic acid of 9.9.  CT abdomen showed evidence of pneumomediastinum, pneumoperitoneum with concern for perforated viscus.  Patient was taken to surgery and had a robotic assisted laparotomy performed with bilateral chest tube placement and bilateral intra-abdominal drain placement on 11/10.  They noted gangrene of the lower thoracic esophagus and perforation of an esophageal ulcer which were repaired.  General surgery also did an EGD right after the laparoscopy and found no evidence of intra esophageal leakage.  However patient then went into acute hemorrhagic shock, acute hypoxic respiratory failure and admitted to the ICU.  He was intubated and on pressors and antibiotics but has since been extubated and off pressors and not tolerating NG tube feeds.  Discussion with CT surgery resulted in recommendation of transferring to [facility] for further evaluation.  AES consulted for possible stent placement if necessary. s/p EGD 11/25which demonstrated large esophageal perforation- stent placed. Was showing increased respiratory requirements. He developed ARDS subsequently and is now intubated     Interval History: NAEON. Continues to be intubated and mechanically ventilated. Requiring Levophed 0.1. CT with contrast 11/30 showed stable position of esophageal stent and perigastric heterogeneous collection measuring approximately 5.2 x 7.1 cm along the lesser curvature of the stomach. Perigastric fluid collection not accessible for drainage, but went for IR drainage of R sided pleural fluid collection. WBC 20.26 from 26. Spiked fever of 101.3 yesterday 12/1 but is currently  afebrile. Patient is overall very ill and palliative medicine are having ongoing conversations regarding GOC and possible comfort care.     Review of Systems  Objective:     Vital Signs (Most Recent):  Temp: 97.8 °F (36.6 °C) (12/02/24 1130)  Pulse: 63 (12/02/24 1615)  Resp: (!) 32 (12/02/24 1615)  BP: 117/65 (12/02/24 1130)  SpO2: 99 % (12/02/24 1615) Vital Signs (24h Range):  Temp:  [97.5 °F (36.4 °C)-98.8 °F (37.1 °C)] 97.8 °F (36.6 °C)  Pulse:  [62-82] 63  Resp:  [16-35] 32  SpO2:  [99 %-100 %] 99 %  BP: (109-124)/(59-67) 117/65  Arterial Line BP: ()/(47-76) 139/76     Weight: 77.6 kg (171 lb 1.2 oz) (11/26/24 1056)  Body mass index is 27.61 kg/m².      Intake/Output Summary (Last 24 hours) at 12/2/2024 1709  Last data filed at 12/2/2024 1600  Gross per 24 hour   Intake 5401.31 ml   Output 3310 ml   Net 2091.31 ml       Lines/Drains/Airways       Central Venous Catheter Line  Duration             Percutaneous Central Line - Triple Lumen    Internal Jugular Right -- days              Drain  Duration                  Chest Tube   Left Midaxillary -- days         Chest Tube   Right Midaxillary -- days         Closed/Suction Drain   Right Abdomen Bulb -- days         Closed/Suction Drain  Left Abdomen Bulb -- days         Urethral Catheter 11/23/24 1313 Temperature probe 9 days         Chest Tube 11/29/24 1534 Tube - 3 Right Midaxillary;Pleural 14 Fr. 3 days         NG/OG Tube 11/30/24 1100 14 Fr. Left nostril 2 days              Airway  Duration                  Airway - Non-Surgical 11/27/24 1520 Endotracheal Tube 5 days              Arterial Line  Duration             Arterial Line 11/27/24 1810 Left Radial 4 days              Peripheral Intravenous Line  Duration                  Peripheral IV - Single Lumen 12/01/24 0400 20 G Anterior;Left Upper Arm 1 day         Peripheral IV - Single Lumen 12/01/24 1603 18 G 1 3/4 in No Anterior;Right Forearm 1 day                     Physical Exam  Constitutional:        Comments: Intubated and sedated   Pulmonary:      Comments: Mechanically ventilated. Bilateral chest tubes, two on right and one on left with ss output. Continued air leak from right sided large bore chest tube  Abdominal:      General: Abdomen is flat. There is no distension.      Palpations: Abdomen is soft.      Comments: Bilateral KENNY drains with serosanguinous output           Significant Labs:  CBC:   Recent Labs   Lab 12/01/24  0349 12/01/24  1033 12/02/24  0355 12/02/24  0406 12/02/24  1534   WBC 26.92*  --  21.91*  --  20.26*   HGB 7.5*  --  6.3*  --  7.0*   HCT 24.0*   < > 19.7* 20* 21.4*     --  248  --  218    < > = values in this interval not displayed.     CMP:   Recent Labs   Lab 12/02/24  0355 12/02/24  1314   * 156*   CALCIUM 8.4* 8.1*   ALBUMIN 1.0*  --    PROT 7.0  --    * 137   K 4.2 3.2*   CO2 22* 23    101   BUN 76* 79*   CREATININE 2.5* 2.3*   ALKPHOS 169*  --    ALT 23  --    AST 79*  --    BILITOT 2.2*  --      Liver Function Test:   Recent Labs   Lab 12/01/24  0349 12/02/24  0355   ALT 20 23   AST 61* 79*   ALKPHOS 204* 169*   BILITOT 2.6* 2.2*   PROT 7.0 7.0   ALBUMIN 1.1* 1.0*         Significant Imaging:  Imaging results within the past 24 hours have been reviewed.

## 2024-12-02 NOTE — PLAN OF CARE
[facility] - Medical ICU  Discharge Reassessment    Primary Care Provider: Tong Cantrell MD    Expected Discharge Date: 12/6/2024    Reassessment (most recent)       Discharge Reassessment - 12/02/24 1416          Discharge Reassessment    Assessment Type Discharge Planning Reassessment (P)      Did the patient's condition or plan change since previous assessment? No (P)      Communicated PHILLIP with patient/caregiver Date not available/Unable to determine (P)      Discharge Plan A Long-term acute care facility (LTAC) (P)      DME Needed Upon Discharge  other (see comments) (P)    TBD.    Transition of Care Barriers None (P)      Why the patient remains in the hospital Requires continued medical care (P)         Post-Acute Status    Post-Acute Authorization Placement (P)      Post-Acute Placement Status Referrals Sent (P)      Coverage [employer] (P)                    [facility] & [facility] referrals. Pt. remains on Pressors. Palliative Care consulted.     Discharge Plan A and Plan B have been determined by review of patient's clinical status, future medical and therapeutic needs, and coverage/benefits for post-acute care in coordination with multidisciplinary team members.     Lenore Parkinson LMSW

## 2024-12-02 NOTE — ASSESSMENT & PLAN NOTE
-- Continues to decompensate on appropriate medical therapies  -- S/P multiple invasive interventions  -- Palliative care consulted, appreciate assistance

## 2024-12-02 NOTE — PROGRESS NOTES
Family conference held with Mr. Hernández's two daughters and son this evening. They were updated on Mr. Hernández's current medical conditions: esophageal perforation s/p stent placement, ALFREDO, septic shock of unknown source on two vasopressors, artificial nutrition via TPN and lipids, respiratory failure on mechanical ventilation. I explained that Mr. Hernández is in critical condition, currently requiring multiple forms of life support. I shared my concern that patient continued to worsen despite maximum therapies and I was concerned this illness would be a terminal event. I also reviewed code status and how, due to the severity of illness, I did not believe it would benefit Mr. Hernández to receive CPR should his heart stop beating. All questions were answered. After reviewing patients care and medication, daughters asked about hospice and comfort care in the ICU stating their father would not want to be on long term life support. Patient's son did not engage in conversation and is not ready to make decisions on GOC and code status. I explained I asked palliative care to see family to continue to determine goals of care.     Kathryn Mckinney-Roxana, DNP  Pulmonary Critical Care

## 2024-12-02 NOTE — SUBJECTIVE & OBJECTIVE
Interval History: Vasopressor requirements decreasing, however remains on two pressors. Levophed down to 0.12, vaso stable at 0.04. FiO2 weaned to 70%. Continued air leak from R chest tube.     Medications:  Continuous Infusions:   fentanyl  0-250 mcg/hr Intravenous Continuous 22.5 mL/hr at 12/02/24 0600 225 mcg/hr at 12/02/24 0600    NORepinephrine bitartrate-D5W  0-3 mcg/kg/min Intravenous Continuous 4.4 mL/hr at 12/02/24 0600 0.12 mcg/kg/min at 12/02/24 0600    propofoL  0-50 mcg/kg/min Intravenous Continuous 16.3 mL/hr at 12/02/24 0600 35 mcg/kg/min at 12/02/24 0600    TPN ADULT CENTRAL LINE CUSTOM   Intravenous Continuous 45 mL/hr at 12/02/24 0600 Rate Verify at 12/02/24 0600    vasopressin  0.04 Units/min Intravenous Continuous 12 mL/hr at 12/02/24 0600 0.04 Units/min at 12/02/24 0600     Scheduled Meds:   heparin (porcine)  5,000 Units Subcutaneous Q8H    hydrocortisone sodium succinate  100 mg Intravenous Q8H    linezolid  600 mg Intravenous Q12H    meropenem IV (PEDS and ADULTS)  2 g Intravenous Q12H    micafungin  100 mg Intravenous Q24H    mupirocin   Nasal BID    pantoprazole  40 mg Intravenous BID    sodium zirconium cyclosilicate  10 g Oral TID     PRN Meds:  Current Facility-Administered Medications:     0.9%  NaCl infusion (for blood administration), , Intravenous, Q24H PRN    acetaminophen, 650 mg, Rectal, Q6H PRN    fentanyl, 50 mcg, Intravenous, Q30 Min PRN    HYDROmorphone, 0.5 mg, Intravenous, Q6H PRN    iohexol, 15 mL, Oral, PRN    sodium chloride 0.9%, 10 mL, Intravenous, PRN     Review of patient's allergies indicates:  No Known Allergies  Objective:     Vital Signs (Most Recent):  Temp: 98.7 °F (37.1 °C) (12/02/24 0301)  Pulse: 72 (12/02/24 0810)  Resp: (!) 32 (12/02/24 0810)  BP: 114/65 (11/29/24 1540)  SpO2: 100 % (12/02/24 0810) Vital Signs (24h Range):  Temp:  [98.4 °F (36.9 °C)-100.1 °F (37.8 °C)] 98.7 °F (37.1 °C)  Pulse:  [71-90] 72  Resp:  [16-35] 32  SpO2:  [100 %] 100 %  Arterial  Line BP: ()/(47-70) 128/69     Intake/Output - Last 3 Shifts         11/30 0700  12/01 0659 12/01 0700  12/02 0659 12/02 0700  12/03 0659    P.O. 0 0     I.V. (mL/kg) 3744.7 (48.3) 1692.3 (21.8)     NG/GT  85     IV Piggyback 496 991.1     TPN 1860.1 1072.8     Total Intake(mL/kg) 6100.8 (78.6) 3841.2 (49.5)     Urine (mL/kg/hr) 2100 (1.1) 4100 (2.2)     Drains 25 10     Other       Chest Tube 50 155     Total Output 2175 4265     Net +3925.8 -423.8                    SpO2: 100 %        Physical Exam  Vitals reviewed.   Constitutional:       Appearance: He is ill-appearing.   HENT:      Head: Normocephalic.      Mouth/Throat:      Comments: ETT  Pulmonary:      Comments: Mechanically ventilated. Bilateral chest tubes, two on right and one on left with ss output. Continued air leak from right sided large bore chest tube  Abdominal:      General: There is no distension.      Palpations: Abdomen is soft.      Comments: Bilateral KENNY drains with serosanguinous output   Genitourinary:     Comments: douglass  Skin:     General: Skin is warm.      Findings: No rash.            Significant Labs:  All pertinent labs from the last 24 hours have been reviewed.    Significant Diagnostics:  I have reviewed and interpreted all pertinent imaging results/findings within the past 24 hours.    VTE Risk Mitigation (From admission, onward)           Ordered     heparin (porcine) injection 5,000 Units  Every 8 hours         11/26/24 0851

## 2024-12-02 NOTE — LOPA/MORA/SWTA/AOC/AEB
[facility])  On-Site Evaluation  [facility] Contact # [phone]        Thank you for the referral of this patient to determine suitability for organ, tissue, and eye donation.  A chart review has been conducted 12/2/24 at 1230.  [facility] findings are as follows:    ? Potential candidate for organ donation - [facility] following patient. Any changes in patients condition, discussion of withdrawing the vent or brain death exams, or family mention of donation immediately call [phone].    ? Potential for candidate for tissue and eye donation- call [facility] at [phone] within 2 hours of death for screening as a potential tissue and/or eye donor.       [facility] Representative: JEAN PAUL ChenN, RN, CCRN    [facility] Referral Number:  [id number]

## 2024-12-02 NOTE — ASSESSMENT & PLAN NOTE
-S/p EGD 11/25 with large distal defect- stented  -WBC of 20 from 26; Fever of 101.3 yesterday but currently afebrile  -He is currently critically ill in the ICU requiring intubation and pressors- Palliative medicine is having ongoing conversation with family regarding GOC and possible comfort care  -If the patient is eventually able to be extubated and improves- can proceed with esophagram to evaluate the esophagus for ongoing leaks/stent placement  - If no leak seen on future esophagram, could consider no more than a liquid diet for nutrition.   - If enteral nutrition is desired, the patient does have a known pyloric stenosis so could consider review with surgery for surgical jejunostomy or IR guided G or GJ tube. Otherwise, patient might require temporary TPN until its determined whether there is ongoing esophageal leak or not.   -Repeat upper endoscopy with fluoro in 4-6 weeks from stent placement at [facility] only for partially covered esophageal stent removal/probable exchange

## 2024-12-02 NOTE — PROGRESS NOTES
[facility] - Medical ICU  Gastroenterology  Progress Note    Patient Name: Jose Hernández Sr.  MRN: [id number]  Admission Date: 11/23/2024  Hospital Length of Stay: 9 days  Code Status: Full Code   Attending Provider: Eduardo Garber MD  Consulting Provider: Mayur Miles PA-C  Primary Care Physician: Tong Cantrell MD  Principal Problem: Esophageal perforation        Subjective:   HPI: Jose Hernández Sr. is a 70 yo M w/ CHF (EF 20%), chronic NSAID use, alcohol dependence, spinal stenosis, CVA who was admitted to outside hospital on 11/10/2024 with epigastric pain, nausea, vomiting, diarrhea, melena.  He was found to be anemic with a hemoglobin of 5.7, and vitals with blood pressure 70s over 40s, pulse 110 and a lactic acid of 9.9.  CT abdomen showed evidence of pneumomediastinum, pneumoperitoneum with concern for perforated viscus.  Patient was taken to surgery and had a robotic assisted laparotomy performed with bilateral chest tube placement and bilateral intra-abdominal drain placement on 11/10.  They noted gangrene of the lower thoracic esophagus and perforation of an esophageal ulcer which were repaired.  General surgery also did an EGD right after the laparoscopy and found no evidence of intra esophageal leakage.  However patient then went into acute hemorrhagic shock, acute hypoxic respiratory failure and admitted to the ICU.  He was intubated and on pressors and antibiotics but has since been extubated and off pressors and not tolerating NG tube feeds.  Discussion with CT surgery resulted in recommendation of transferring to [facility] for further evaluation.  AES consulted for possible stent placement if necessary. s/p EGD 11/25which demonstrated large esophageal perforation- stent placed. Was showing increased respiratory requirements. He developed ARDS subsequently and is now intubated     Interval History: NAEON. Continues to be intubated and mechanically ventilated. Requiring Levophed 0.1. CT with  contrast 11/30 showed stable position of esophageal stent and perigastric heterogeneous collection measuring approximately 5.2 x 7.1 cm along the lesser curvature of the stomach. Perigastric fluid collection not accessible for drainage, but went for IR drainage of R sided pleural fluid collection. WBC 20.26 from 26. Spiked fever of 101.3 yesterday 12/1 but is currently afebrile. Patient is overall very ill and palliative medicine are having ongoing conversations regarding GOC and possible comfort care.     Review of Systems  Objective:     Vital Signs (Most Recent):  Temp: 97.8 °F (36.6 °C) (12/02/24 1130)  Pulse: 63 (12/02/24 1615)  Resp: (!) 32 (12/02/24 1615)  BP: 117/65 (12/02/24 1130)  SpO2: 99 % (12/02/24 1615) Vital Signs (24h Range):  Temp:  [97.5 °F (36.4 °C)-98.8 °F (37.1 °C)] 97.8 °F (36.6 °C)  Pulse:  [62-82] 63  Resp:  [16-35] 32  SpO2:  [99 %-100 %] 99 %  BP: (109-124)/(59-67) 117/65  Arterial Line BP: ()/(47-76) 139/76     Weight: 77.6 kg (171 lb 1.2 oz) (11/26/24 1056)  Body mass index is 27.61 kg/m².      Intake/Output Summary (Last 24 hours) at 12/2/2024 1709  Last data filed at 12/2/2024 1600  Gross per 24 hour   Intake 5401.31 ml   Output 3310 ml   Net 2091.31 ml       Lines/Drains/Airways       Central Venous Catheter Line  Duration             Percutaneous Central Line - Triple Lumen    Internal Jugular Right -- days              Drain  Duration                  Chest Tube   Left Midaxillary -- days         Chest Tube   Right Midaxillary -- days         Closed/Suction Drain   Right Abdomen Bulb -- days         Closed/Suction Drain  Left Abdomen Bulb -- days         Urethral Catheter 11/23/24 1313 Temperature probe 9 days         Chest Tube 11/29/24 1534 Tube - 3 Right Midaxillary;Pleural 14 Fr. 3 days         NG/OG Tube 11/30/24 1100 14 Fr. Left nostril 2 days              Airway  Duration                  Airway - Non-Surgical 11/27/24 1520 Endotracheal Tube 5 days              Arterial  Line  Duration             Arterial Line 11/27/24 1810 Left Radial 4 days              Peripheral Intravenous Line  Duration                  Peripheral IV - Single Lumen 12/01/24 0400 20 G Anterior;Left Upper Arm 1 day         Peripheral IV - Single Lumen 12/01/24 1603 18 G 1 3/4 in No Anterior;Right Forearm 1 day                     Physical Exam  Constitutional:       Comments: Intubated and sedated   Pulmonary:      Comments: Mechanically ventilated. Bilateral chest tubes, two on right and one on left with ss output. Continued air leak from right sided large bore chest tube  Abdominal:      General: Abdomen is flat. There is no distension.      Palpations: Abdomen is soft.      Comments: Bilateral KENNY drains with serosanguinous output           Significant Labs:  CBC:   Recent Labs   Lab 12/01/24  0349 12/01/24  1033 12/02/24  0355 12/02/24  0406 12/02/24  1534   WBC 26.92*  --  21.91*  --  20.26*   HGB 7.5*  --  6.3*  --  7.0*   HCT 24.0*   < > 19.7* 20* 21.4*     --  248  --  218    < > = values in this interval not displayed.     CMP:   Recent Labs   Lab 12/02/24  0355 12/02/24  1314   * 156*   CALCIUM 8.4* 8.1*   ALBUMIN 1.0*  --    PROT 7.0  --    * 137   K 4.2 3.2*   CO2 22* 23    101   BUN 76* 79*   CREATININE 2.5* 2.3*   ALKPHOS 169*  --    ALT 23  --    AST 79*  --    BILITOT 2.2*  --      Liver Function Test:   Recent Labs   Lab 12/01/24  0349 12/02/24  0355   ALT 20 23   AST 61* 79*   ALKPHOS 204* 169*   BILITOT 2.6* 2.2*   PROT 7.0 7.0   ALBUMIN 1.1* 1.0*         Significant Imaging:  Imaging results within the past 24 hours have been reviewed.  Assessment/Plan:     GI  * Esophageal perforation  -S/p EGD 11/25 with large distal defect- stented  -WBC of 20 from 26; Fever of 101.3 yesterday but currently afebrile  -He is currently critically ill in the ICU requiring intubation and pressors- Palliative medicine is having ongoing conversation with family regarding GOC and  possible comfort care  -If the patient is eventually able to be extubated and improves- can proceed with esophagram to evaluate the esophagus for ongoing leaks/stent placement  - If no leak seen on future esophagram, could consider no more than a liquid diet for nutrition.   - If enteral nutrition is desired, the patient does have a known pyloric stenosis so could consider review with surgery for surgical jejunostomy or IR guided G or GJ tube. Otherwise, patient might require temporary TPN until its determined whether there is ongoing esophageal leak or not.   -Repeat upper endoscopy with fluoro in 4-6 weeks from stent placement at [facility] only for partially covered esophageal stent removal/probable exchange         Thank you for your consult. I will follow-up with patient. Please contact us if you have any additional questions.    Mayur Miles PA-C  Gastroenterology  [facility] - Medical ICU

## 2024-12-02 NOTE — PROGRESS NOTES
[facility] - Medical ICU  Thoracic Surgery  Progress Note    Subjective:     History of Present Illness:  Mr. Hernández is a 69 yoM with PMH significant for chronic alcohol abuse and NSAID use who suffered a perforated esophageal ulcer and underwent promary repair with bilateral chest tube placement and bilateral intraabdominal drain placement on 11/10 at an [facility]. He was transferred here for concern of persistent leak and potential AES, IR, and CTS intervention. Of note, his EF was found to be 20% on [facility] echo. He was transferred to the MICU.     Post-Op Info:  Procedure(s) (LRB):  EGD (ESOPHAGOGASTRODUODENOSCOPY) (N/A)   7 Days Post-Op     Interval History: Vasopressor requirements decreasing, however remains on two pressors. Levophed down to 0.12, vaso stable at 0.04. FiO2 weaned to 70%. Continued air leak from R chest tube.     Medications:  Continuous Infusions:   fentanyl  0-250 mcg/hr Intravenous Continuous 22.5 mL/hr at 12/02/24 0600 225 mcg/hr at 12/02/24 0600    NORepinephrine bitartrate-D5W  0-3 mcg/kg/min Intravenous Continuous 4.4 mL/hr at 12/02/24 0600 0.12 mcg/kg/min at 12/02/24 0600    propofoL  0-50 mcg/kg/min Intravenous Continuous 16.3 mL/hr at 12/02/24 0600 35 mcg/kg/min at 12/02/24 0600    TPN ADULT CENTRAL LINE CUSTOM   Intravenous Continuous 45 mL/hr at 12/02/24 0600 Rate Verify at 12/02/24 0600    vasopressin  0.04 Units/min Intravenous Continuous 12 mL/hr at 12/02/24 0600 0.04 Units/min at 12/02/24 0600     Scheduled Meds:   heparin (porcine)  5,000 Units Subcutaneous Q8H    hydrocortisone sodium succinate  100 mg Intravenous Q8H    linezolid  600 mg Intravenous Q12H    meropenem IV (PEDS and ADULTS)  2 g Intravenous Q12H    micafungin  100 mg Intravenous Q24H    mupirocin   Nasal BID    pantoprazole  40 mg Intravenous BID    sodium zirconium cyclosilicate  10 g Oral TID     PRN Meds:  Current Facility-Administered Medications:     0.9%  NaCl infusion (for blood administration), , Intravenous, Q24H  PRN    acetaminophen, 650 mg, Rectal, Q6H PRN    fentanyl, 50 mcg, Intravenous, Q30 Min PRN    HYDROmorphone, 0.5 mg, Intravenous, Q6H PRN    iohexol, 15 mL, Oral, PRN    sodium chloride 0.9%, 10 mL, Intravenous, PRN     Review of patient's allergies indicates:  No Known Allergies  Objective:     Vital Signs (Most Recent):  Temp: 98.7 °F (37.1 °C) (12/02/24 0301)  Pulse: 72 (12/02/24 0810)  Resp: (!) 32 (12/02/24 0810)  BP: 114/65 (11/29/24 1540)  SpO2: 100 % (12/02/24 0810) Vital Signs (24h Range):  Temp:  [98.4 °F (36.9 °C)-100.1 °F (37.8 °C)] 98.7 °F (37.1 °C)  Pulse:  [71-90] 72  Resp:  [16-35] 32  SpO2:  [100 %] 100 %  Arterial Line BP: ()/(47-70) 128/69     Intake/Output - Last 3 Shifts         11/30 0700  12/01 0659 12/01 0700  12/02 0659 12/02 0700  12/03 0659    P.O. 0 0     I.V. (mL/kg) 3744.7 (48.3) 1692.3 (21.8)     NG/GT  85     IV Piggyback 496 991.1     TPN 1860.1 1072.8     Total Intake(mL/kg) 6100.8 (78.6) 3841.2 (49.5)     Urine (mL/kg/hr) 2100 (1.1) 4100 (2.2)     Drains 25 10     Other       Chest Tube 50 155     Total Output 2175 4265     Net +3925.8 -423.8                    SpO2: 100 %        Physical Exam  Vitals reviewed.   Constitutional:       Appearance: He is ill-appearing.   HENT:      Head: Normocephalic.      Mouth/Throat:      Comments: ETT  Pulmonary:      Comments: Mechanically ventilated. Bilateral chest tubes, two on right and one on left with ss output. Continued air leak from right sided large bore chest tube  Abdominal:      General: There is no distension.      Palpations: Abdomen is soft.      Comments: Bilateral KENNY drains with serosanguinous output   Genitourinary:     Comments: douglass  Skin:     General: Skin is warm.      Findings: No rash.            Significant Labs:  All pertinent labs from the last 24 hours have been reviewed.    Significant Diagnostics:  I have reviewed and interpreted all pertinent imaging results/findings within the past 24 hours.    VTE Risk  Mitigation (From admission, onward)           Ordered     heparin (porcine) injection 5,000 Units  Every 8 hours         11/26/24 0851                  Assessment/Plan:     * Esophageal perforation  69 yoM s/p primary [facility] perfor[facility] at [facility]. Transferred for concern of ongoing leak. No access to outside imaging at this time. Overall clinically stable in the MICU. Not on pressors. Minimal respiratory support. Case discussed with Dr. Goldberg and reviewed with family. Patient is quite frail with poor cardiac function and would not tolerate surgical intervention well, therefore minimally invasive procedures would be preferable.    - underwent stent with AES, large defect near GEJ  - unfortunately, there are few surgical options for this patient as he has already failed attempted surgical repair. And with recent decompensation, he would not tolerate an intervention even if one were indicated at this time  - CT shows no enlarging fluid accumulations with drains in appropriate position. And there is no obvious large leak on CT with contrast through NGT 11/30/2024   - Care per MICU  - Thoracic surgery will follow        Morgan Naranjo MD  Thoracic Surgery  [facility] - Medical ICU

## 2024-12-02 NOTE — ASSESSMENT & PLAN NOTE
History of HTN, had stopped taking home medications about a month prior to admission at [facility].    - Holding home meds in setting of two pressor shock

## 2024-12-03 NOTE — ASSESSMENT & PLAN NOTE
69 yoM s/p primary repair of esophageal perforation at [facility] on [facility]/10. Transferred for concern of ongoing leak. No access to outside imaging at this time. Overall clinically stable in the MICU. Not on pressors. Minimal respiratory support. Case discussed with Dr. Goldberg and reviewed with family. Patient is quite frail with poor cardiac function and would not tolerate surgical intervention well, therefore minimally invasive procedures would be preferable.    - underwent stent with AES, large defect near GEJ  - unfortunately, there are few surgical options for this patient as he has already failed attempted surgical repair. And with recent decompensation, he would not tolerate an intervention even if one were indicated at this time  - CT shows no enlarging fluid accumulations with drains in appropriate position. And there is no obvious large leak on CT with contrast through NGT 11/30/2024   - Care per MICU  - Thoracic surgery will follow peripherally

## 2024-12-03 NOTE — PLAN OF CARE
MICU DAILY GOALS     Family/Goals of care/Code Status   Code Status: Full Code    24H Vital Sign Range  Temp:  [94.4 °F (34.7 °C)-98.8 °F (37.1 °C)]   Pulse:  [57-77]   Resp:  [23-32]   BP: (109-124)/(59-67)   SpO2:  [88 %-100 %]   Arterial Line BP: ()/(48-76)      Shift Events (include procedures and significant events)   Patient hypothermic and bradycardic at beginning of shift. Warming blanket applied with improvement. Vasopressors titrated as tolerated. 1 unit PRBCs given.    AWAKE RASS: Goal - RASS Goal: -2-->light sedation  Actual - RASS ([place] Agitation-Sedation Scale): moderate sedation    Restraint necessity: Not necessary   BREATHE SBT: Not attempted    Coordinate A & B, analgesics/sedatives Pain: managed   SAT: Not attempted   Delirium CAM-ICU: Overall CAM-ICU: Positive   Early(intubated/ Progressive (non-intubated) Mobility MOVE Screen (INTUBATED ONLY): Not attempted    Activity: Activity Management: Rolling - L1   Feeding/Nutrition Diet order: Diet/Nutrition Received: NPO,     Thrombus DVT prophylaxis: VTE Core Measure: Pharmacological prophylaxis initiated/maintained   HOB Elevation Head of Bed (HOB) Positioning: HOB elevated   Ulcer Prophylaxis GI: yes   Glucose control managed Glycemic Management: blood glucose monitored   Skin Skin assessment:     Sacrum intact/not altered? Yes  Heels intact/not altered? Yes  Surgical wound? Yes    CHECK ONE!   (no altered skin or altered skin) and sub boxes:  [] No Altered Skin Integrity Present    []Prevention Measures Documented    [x] Altered Skin Integrity Present or Discovered   [] LDA present in EPIC, daily doc completed              [] LDA added if not in EPIC (describe wound).                    When describing wound, do not stage, use descriptive words only.    [] Wound Image Taken (required on admit,                   transfer/discharge and every Tuesday)    Wound Care Consulted? No   Bowel Function no issues    Indwelling Catheter Necessity       Urethral Catheter 11/23/24 1313 Temperature probe-Reason for Continuing Urinary Catheterization: Critically ill in ICU and requiring hourly monitoring of intake/output  [REMOVED]      Urethral Catheter  -Reason for Continuing Urinary Catheterization: Critically ill in ICU and requiring hourly monitoring of intake/output    Percutaneous Central Line - Triple Lumen    Internal Jugular Right-Line Necessity Review: Hemodynamic instability     De-escalation Antibiotics Yes        VS and assessment per flow sheet, patient progressing towards goals as tolerated, all concerns addressed.  Problem: Adult Inpatient Plan of Care  Goal: Plan of Care Review  Outcome: Progressing  Goal: Absence of Hospital-Acquired Illness or Injury  Outcome: Progressing  Goal: Readiness for Transition of Care  Outcome: Progressing     Problem: Infection  Goal: Absence of Infection Signs and Symptoms  Outcome: Progressing     Problem: Wound  Goal: Optimal Coping  Outcome: Progressing

## 2024-12-03 NOTE — SUBJECTIVE & OBJECTIVE
Interval History: Mr. Hernández's levophed requirements continue to wax and wane, now up to 0.04 this AM. Remains on vaso. Stable vent setting CT with continued leak.     Medications:  Continuous Infusions:   D10W   Intravenous Continuous PRN        fentanyl  0-250 mcg/hr Intravenous Continuous 20 mL/hr at 12/03/24 0501 200 mcg/hr at 12/03/24 0501    NORepinephrine bitartrate-D5W  0-3 mcg/kg/min Intravenous Continuous 7.3 mL/hr at 12/03/24 0501 0.05 mcg/kg/min at 12/03/24 0501    propofoL  0-50 mcg/kg/min Intravenous Continuous 7 mL/hr at 12/03/24 0501 15 mcg/kg/min at 12/03/24 0501    vasopressin  0.04 Units/min Intravenous Continuous 12 mL/hr at 12/03/24 0501 0.04 Units/min at 12/03/24 0501     Scheduled Meds:   heparin (porcine)  5,000 Units Subcutaneous Q8H    linezolid  600 mg Intravenous Q12H    meropenem IV (PEDS and ADULTS)  2 g Intravenous Q12H    micafungin  100 mg Intravenous Q24H    mupirocin   Nasal BID    pantoprazole  40 mg Intravenous BID     PRN Meds:  Current Facility-Administered Medications:     0.9%  NaCl infusion (for blood administration), , Intravenous, Q24H PRN    acetaminophen, 650 mg, Rectal, Q6H PRN    D10W, , Intravenous, Continuous PRN    dextrose 10%, 12.5 g, Intravenous, PRN    dextrose 10%, 25 g, Intravenous, PRN    fentanyl, 50 mcg, Intravenous, Q30 Min PRN    glucagon (human recombinant), 1 mg, Intramuscular, PRN    HYDROmorphone, 0.5 mg, Intravenous, Q6H PRN    insulin aspart U-100, 0-5 Units, Subcutaneous, Q4H PRN    iohexol, 15 mL, Oral, PRN    sodium chloride 0.9%, 10 mL, Intravenous, PRN     Review of patient's allergies indicates:  No Known Allergies  Objective:     Vital Signs (Most Recent):  Temp: 98.5 °F (36.9 °C) (12/03/24 0525)  Pulse: 72 (12/03/24 0739)  Resp: (!) 32 (12/03/24 0739)  BP: 117/65 (12/02/24 1130)  SpO2: 100 % (12/03/24 0739) Vital Signs (24h Range):  Temp:  [94.4 °F (34.7 °C)-98.6 °F (37 °C)] 98.5 °F (36.9 °C)  Pulse:  [57-77] 72  Resp:  [23-32] 32  SpO2:   [88 %-100 %] 100 %  BP: (109-124)/(59-67) 117/65  Arterial Line BP: ()/(48-76) 97/56     Intake/Output - Last 3 Shifts         12/01 0700  12/02 0659 12/02 0700  12/03 0659 12/03 0700  12/04 0659    P.O. 0      I.V. (mL/kg) 1692.3 (21.8) 994.2 (12.8)     Blood  285     NG/GT 85 30     IV Piggyback 991.1 961     TPN 1072.8 320.1     Total Intake(mL/kg) 3841.2 (49.5) 2590.3 (33.4)     Urine (mL/kg/hr) 4100 (2.2) 2395 (1.3)     Drains 10 0     Stool  0     Chest Tube 155 150     Total Output 4265 2545     Net -423.8 +45.3            Stool Occurrence  1 x             SpO2: 100 %        Physical Exam  Vitals reviewed.   Constitutional:       Appearance: He is ill-appearing.   HENT:      Head: Normocephalic.      Mouth/Throat:      Comments: ETT  Pulmonary:      Comments: Mechanically ventilated. Bilateral chest tubes, two on right and one on left with ss output. Continued air leak from right sided large bore chest tube  Abdominal:      General: There is no distension.      Palpations: Abdomen is soft.      Comments: Bilateral KENNY drains with serosanguinous output   Genitourinary:     Comments: douglass  Skin:     General: Skin is warm.      Findings: No rash.            Significant Labs:  All pertinent labs from the last 24 hours have been reviewed.    Significant Diagnostics:  I have reviewed and interpreted all pertinent imaging results/findings within the past 24 hours.    VTE Risk Mitigation (From admission, onward)           Ordered     heparin (porcine) injection 5,000 Units  Every 8 hours         11/26/24 0851

## 2024-12-03 NOTE — SIGNIFICANT EVENT
Family conference held with patients daughters this afternoon. They were updated on Mr. Hernández's current medical conditions: respiratory failure requiring mechanical ventilation, shock requiring two vasopressors, infections of pleural space requiring multiple antibiotics, ALFREDO, inability to provide nutrition, esophageal perforation s/p esophageal stenting as well as new onset of unprovoked episodes of bradycardia and hypotension. I explained that Mr. Hernández is in critical condition, currently requiring multiple forms of life support. All questions were answered. Family stated that it would not be in alignment with Mr. Hernández's wishes to continue with these interventions. At the end of our discussion, at the request of family, the decision was made to withdraw life support and transition to comfort measures.     Order for invasive measures discontinued. Comfort measures orders placed along with DNR order. Plan to initiate morphine infusion with PRN morphine bolus and IV Ativan, discontinue vasopressors, and remove ETT per patient's family request once patient is comfortable.     Above plan discussed with Dr. Garber.     Kathryn Moreno, DNP  Pulmonary Critical Care

## 2024-12-03 NOTE — SUBJECTIVE & OBJECTIVE
Interval History: Notes reviewed. Patient resting comfortably. Opens eyes on command.    Review of Systems   Unable to perform ROS: Intubated     Objective:     Vital Signs (Most Recent):  Temp: 99 °F (37.2 °C) (12/03/24 0701)  Pulse: 72 (12/03/24 1217)  Resp: (!) 43 (12/03/24 1217)  BP: 117/65 (12/02/24 1130)  SpO2: 97 % (12/03/24 1217) Vital Signs (24h Range):  Temp:  [94.4 °F (34.7 °C)-99 °F (37.2 °C)] 99 °F (37.2 °C)  Pulse:  [57-77] 72  Resp:  [23-43] 43  SpO2:  [88 %-100 %] 97 %  Arterial Line BP: ()/(48-76) 128/72     Weight: 77.6 kg (171 lb 1.2 oz)  Body mass index is 27.61 kg/m².    Estimated Creatinine Clearance: 31.1 mL/min (A) (based on SCr of 2.2 mg/dL (H)).     Physical Exam  Vitals and nursing note reviewed.   Constitutional:       General: He is not in acute distress.     Appearance: Normal appearance. He is not toxic-appearing.   HENT:      Head: Normocephalic.   Eyes:      Pupils: Pupils are equal, round, and reactive to light.   Cardiovascular:      Rate and Rhythm: Normal rate.      Heart sounds: No murmur heard.  Pulmonary:      Breath sounds: No wheezing or rhonchi.   Abdominal:      Tenderness: There is no guarding or rebound.   Skin:     Findings: No erythema.   Neurological:      Mental Status: He is alert.     Chest tubes, drains present.     Significant Labs: BMP:   Recent Labs   Lab 12/03/24  0300 12/03/24  0559 12/03/24  1305      < > 81      < > 139   K 3.4*   < > 3.2*      < > 106   CO2 20*   < > 24   BUN 83*   < > 78*   CREATININE 2.3*   < > 2.2*   CALCIUM 7.8*   < > 7.7*   MG 2.2  --   --     < > = values in this interval not displayed.     CBC:   Recent Labs   Lab 12/02/24  0355 12/02/24  0406 12/02/24  1534 12/03/24  0300 12/03/24  1147   WBC 21.91*  --  20.26* 16.30*  --    HGB 6.3*  --  7.0* 6.8*  --    HCT 19.7*   < > 21.4* 20.4* 23*     --  218 223  --     < > = values in this interval not displayed.     Microbiology Results (last 7 days)        Procedure Component Value Units Date/Time    Culture, Anaerobic [[id number]] Collected: 11/29/24 1539    Order Status: Completed Specimen: Pleural Fluid Updated: 12/03/24 1055     Anaerobic Culture No anaerobes isolated    Culture, Respiratory with Gram Stain [[id number]]  (Abnormal) Collected: 12/01/24 0825    Order Status: Completed Specimen: Respiratory from Tracheal Aspirate Updated: 12/03/24 1050     Respiratory Culture GRAM NEGATIVE BAO  Many  Identification and susceptibility pending       Gram Stain (Respiratory) <10 epithelial cells per low power field.     Gram Stain (Respiratory) No WBC's     Gram Stain (Respiratory) Rare  Gram positive rods    Aerobic culture [[id number]]  (Abnormal) Collected: 11/29/24 1539    Order Status: Completed Specimen: Pleural Fluid Updated: 12/03/24 1003     Aerobic Bacterial Culture PEPPER PARAPSILOSIS  Few        STENOTROPHOMONAS (X.) MALTOPHILIA  Rare  Susceptibility pending      Blood culture [[id number]] Collected: 12/01/24 1517    Order Status: Completed Specimen: Blood from Peripheral, Upper Arm, Right Updated: 12/02/24 1612     Blood Culture, Routine No Growth to date      No Growth to date    Blood culture [[id number]] Collected: 12/01/24 1518    Order Status: Completed Specimen: Blood from Peripheral, Antecubital, Right Updated: 12/02/24 1612     Blood Culture, Routine No Growth to date      No Growth to date    Fungus culture [[id number]]  (Abnormal) Collected: 11/29/24 1539    Order Status: Completed Specimen: Pleural Fluid Updated: 12/02/24 1403     Fungus (Mycology) Culture CANDIDA PARAPSILOSIS  Few      Gram stain [[id number]] Collected: 11/29/24 1539    Order Status: Completed Specimen: Pleural Fluid Updated: 11/29/24 2310     Gram Stain Result Many WBC's      No organisms seen    Blood culture [[id number]] Collected: 11/24/24 1635    Order Status: Completed Specimen: Blood from Peripheral, Upper Arm, Left Updated: 11/29/24 1812     Blood Culture, Routine No  growth after 5 days.    Blood culture [[id number]] Collected: 11/24/24 0840    Order Status: Completed Specimen: Blood Updated: 11/29/24 1012     Blood Culture, Routine No growth after 5 days.    Culture, Respiratory with Gram Stain [[id number]]     Order Status: No result Specimen: Respiratory             Significant Imaging: I have reviewed all pertinent imaging results/findings within the past 24 hours.

## 2024-12-03 NOTE — ASSESSMENT & PLAN NOTE
Patient with Hypoxic Respiratory failure which is Acute.  he is not on home oxygen. Supplemental oxygen was provided and noted- Vent Mode: A/C  Oxygen Concentration (%):  [70] 70  Resp Rate Total:  [32 br/min-40 br/min] 32 br/min  PEEP/CPAP:  [10 cmH20] 10 cmH20  Mean Airway Pressure:  [20 cmH20] 20 cmH20  .   Signs/symptoms of respiratory failure include- tachypnea, increased work of breathing, and respiratory distress. Contributing diagnoses includes - Aspiration, CHF, Pleural effusion, and Pneumonia Labs and images were reviewed. Patient Has recent ABG, which has been reviewed. Will treat underlying causes and adjust management of respiratory failure as follows-     Overnight with increasing O2 requirements up to 10L HF NC. Increased WOB. Coarse breath sounds upon auscultation. Most recent chest xray with worsening patchy areas of alveolar consolidation. BNP 1484. Likely fluid volume overload vs. Aspiration. CT scan concerning for ARDS    - Duonebs for wheezing  - Maintains on BSA; linezolid/wilda/hannah  - Sputum culture with GNR  - Lung protective ventilation  - Wean oxygen for SpO2 >90%  - Daily SAT/SBT  - ABG/VBG prn

## 2024-12-03 NOTE — ASSESSMENT & PLAN NOTE
Jose Hernández is a 69 year old man with alcohol use disorder, CVA, cdiff (2021) and hypertension transferred from [facility] for esophageal perforation. Found to have pneumoperitoneum/pneumomediastinum on imaging concerning for perforated viscus and loculated pleural effusion s/p robotic lap on 11/10. Had gangrenous thoracic esophagus/hiatal hernia, esophageal ulcer perforation that was repaired with omental patch (op note significant large intraabdominal/mediastinal pus with food stuffs/pills removed from mediastinum, s/p EGD with reported no evidence of intra-esophageal leak, no OR culture sent. Prior hospital course notable for shock, respiratry failure s/p intubation now extubated. Repeat CT c/w esophageal leak. Labs notable for persistent leukocytosis. [facility] and here are negative. Had esophageal stent placement 11/25 with plan for esophagram on 11/29. Respiratory status worsened requiring intubation on 11/27. Repeat CT chest 11/27 demonstrated that right chest tube is no longer in the R loculated fluid collection and persistence of perigastric fluid collection. Perigastric fluid collection not accessible for drainage, but went for IR drainage of R sided pleural fluid collection. New fevers and worsening respiratory status 12/1, repeat cultures obtained. Overall fever curve seems improved. CXR with slight improvement. Patient is more alert.     Tracheal aspirate growing a GNB (previously Steno) while pleural fluid is growing Candida parapsilosis and Stenotrophomonas.    Recommendations:  - continue meropenem 2g q12 (renal dosing), micafungin 100 mg q24 hours, and Zyvox  - if no improvement, can consider adding eravacycline for Stenotrophomonas (or Fetroja-Levaquin combo)  - f/u cultures

## 2024-12-03 NOTE — PROGRESS NOTES
[facility] - [facility]  Palliative Medicine  Progress Note    Patient Name: Jose Hernández Sr.  MRN: [id number]  Admission Date: 11/23/2024  Hospital Length of Stay: 10 days  Code Status: Full Code   Attending Provider: Eduardo Garber MD  Consulting Provider: EDMAR Poole  Primary Care Physician: Tong Cantrell MD  Principal Problem:Esophageal perforation    Patient information was obtained from primary team.      Assessment/Plan:     Palliative Care  Palliative care encounter  Impression: Pt is a  70 y/o male with PMHx of alcohol use disorder, chronic back pain 2/2 to spinal stenosis, HLD, HTN, and CVA (on plavix) with no residual deficits who presented to OSH on 11/10/24 with epigastric pain and melena. Pt had esophageal perforation with complicated course. Pt is intubated on pressor support, septic shock, ARF, EF15-20%. Pt is sedated.  Pt is full code.     Reason for consult: GOC/ACP. Spoke to MICHELLE Das.     Goals of care/ACP:     Today: No family at bedside. Spoke to pt's bedside nurse. Recommend family meeting with pt's son and daughters with primary team/pal team. Pt still on vent support and pressor support.     12/2/24  Met with one of pt's daughters and granddaughter. Pt has 2 daughters, one son, and 16 grandchildren. Per pt's daughter, she and her sister are aware of severity of pt's illness and poor prognosis. Per daughter, pt was an active man prior to hospitalization and this woul dnot be a good QOL for pt. Per daughter and granddaughter, pt's son having a hard time dealing with pt being in hospital and wants to continue with treatment. Per pt's daughter, daughters are thinking about comfort care but want pt's son to be accepting of this to. Per pt's daughter, she is not sure if he will ever be. Comfort care discussed. Code status discussed. Per daughter, son wants pt to remain full code. At this time, plan is to continue medical management.     Code status: Full  MPOA: Pt's two  daughters and son.     Symptom management:   Pain:   Pt on fentanyl drip.     No distress noted.     Respiratory failure:   Pt intubated, on sedation.     Plan:   Will continue to follow.                   I will follow-up with patient. Please contact us if you have any additional questions.    Subjective:     Chief Complaint:   Chief Complaint   Patient presents with    esophageal perforation     - Admitted to outside facility on 11/10/24. Transferred to [facility] on 11/23/24 for persistent perforation.       HPI:   Pt is a 68 yo M with a PMHx of alcohol use disorder, chronic back pain 2/2 to spinal stenosis, HLD, HTN, and CVA (on plavix) with no residual deficits who presented to OSH on 11/10/24 with epigastric pain and melena; found to have an esophageal perforation. Per chart review, the patient was initally hypotensive, anemic to 5.7, hyperbilirubinemia, lactic acidosis to 9.9. CTAP showed a pneumoperitoneum and pneumomediastinum consistent with a perforated viscus, hiatal hernia with surrounding external gas, and loculated pleural effusions. He was transfused and started on octreotide gtt, protonix, ativan, doxy, zosyn, hannah, vanc, precedex, with AC held. For the loculated pleural effusions, bilateral chest tubes were placed. Diagnostic laparoscopy showed hiatal hernia with gangrene in the lower thoracic esophagus and a perforated esophageal ulcer in the lower thoracic area, and mediastinal fluid pus, food, and pills. He underwent surgical repair on 11/10/24. Additionally, he was found to have HFrEF (EF = 20%).The patient was later extubated but then developed AMS. They repeated imaging with extraluminal air adjacent to the GE junction with increased fluid in the lower posterior mediastium concerning for recurrent esophageal perforation.   On 11/23/24, the patient was transferred to [facility] for AES and CTS consults s/t concern for perforated esophagus and loculated pleural effusions     Per chart review from OSH  the patient stopped taking his medications a month before presentation because he thought they were making him weak. Other ROS positive for paleness for 3 weeks prior to presentation. The patient provided supplemental history and reported that the patient was not eating or drinking except EtOH, drinks 2-3 bottles of wine and 2-4 beers per day.    Pt is intubated, on pressor support.     Hospital Course:  No notes on file    Interval History: Pt intubated on sedation    Past Medical History:   Diagnosis Date    Arthritis     Gout     Hyperlipemia     Hypertension     Stroke 2012       Past Surgical History:   Procedure Laterality Date    ESOPHAGOGASTRODUODENOSCOPY N/A 11/25/2024    Procedure: EGD (ESOPHAGOGASTRODUODENOSCOPY);  Surgeon: Som Balbuena MD;  Location: [facility] ([address] [place] [address]);  Service: Endoscopy;  Laterality: N/A;    HAND SURGERY      SKIN GRAFT         Review of patient's allergies indicates:  No Known Allergies    Medications:  Continuous Infusions:   D10W   Intravenous Continuous PRN 25 mL/hr at 12/03/24 0808 New Bag at 12/03/24 0808    fentanyl  0-250 mcg/hr Intravenous Continuous 20 mL/hr at 12/03/24 0501 200 mcg/hr at 12/03/24 0501    NORepinephrine bitartrate-D5W  0-3 mcg/kg/min Intravenous Continuous 7.3 mL/hr at 12/03/24 0501 0.05 mcg/kg/min at 12/03/24 0501    propofoL  0-50 mcg/kg/min Intravenous Continuous 7 mL/hr at 12/03/24 0501 15 mcg/kg/min at 12/03/24 0501    vasopressin  0.04 Units/min Intravenous Continuous 12 mL/hr at 12/03/24 0501 0.04 Units/min at 12/03/24 0501     Scheduled Meds:   heparin (porcine)  5,000 Units Subcutaneous Q8H    linezolid  600 mg Intravenous Q12H    meropenem IV (PEDS and ADULTS)  2 g Intravenous Q12H    micafungin  100 mg Intravenous Q24H    mupirocin   Nasal BID    pantoprazole  40 mg Intravenous BID     PRN Meds:  Current Facility-Administered Medications:     0.9%  NaCl infusion (for blood administration), , Intravenous, Q24H PRN    acetaminophen, 650 mg,  Rectal, Q6H PRN    D10W, , Intravenous, Continuous PRN    dextrose 10%, 12.5 g, Intravenous, PRN    dextrose 10%, 25 g, Intravenous, PRN    fentanyl, 50 mcg, Intravenous, Q30 Min PRN    glucagon (human recombinant), 1 mg, Intramuscular, PRN    HYDROmorphone, 0.5 mg, Intravenous, Q6H PRN    insulin aspart U-100, 0-5 Units, Subcutaneous, Q4H PRN    iohexol, 15 mL, Oral, PRN    sodium chloride 0.9%, 10 mL, Intravenous, PRN    Family History       Problem Relation (Age of Onset)    Hypertension Father    No Known Problems Mother          Tobacco Use    Smoking status: Never    Smokeless tobacco: Never   Substance and Sexual Activity    Alcohol use: Yes    Drug use: Yes     Types: Marijuana    Sexual activity: Yes       Review of Systems   Unable to perform ROS: Acuity of condition     Objective:     Vital Signs (Most Recent):  Temp: 98.5 °F (36.9 °C) (12/03/24 0525)  Pulse: 72 (12/03/24 0829)  Resp: (!) 32 (12/03/24 0739)  BP: 117/65 (12/02/24 1130)  SpO2: 100 % (12/03/24 0739) Vital Signs (24h Range):  Temp:  [94.4 °F (34.7 °C)-98.5 °F (36.9 °C)] 98.5 °F (36.9 °C)  Pulse:  [57-77] 72  Resp:  [23-32] 32  SpO2:  [88 %-100 %] 100 %  BP: (117)/(65) 117/65  Arterial Line BP: ()/(48-76) 97/56     Weight: 77.6 kg (171 lb 1.2 oz)  Body mass index is 27.61 kg/m².       Physical Exam  Constitutional:       Interventions: He is intubated.      Comments: On sedation, intubated   HENT:      Head: Normocephalic and atraumatic.   Cardiovascular:      Comments: On pressor support  Pulmonary:      Effort: He is intubated.   Skin:     General: Skin is warm and dry.   Neurological:      Comments: On sedation            Review of Symptoms      Symptom Assessment (ESAS 0-10 Scale)  Pain:  0  Dyspnea:  0  Anxiety:  0  Nausea:  0  Depression:  0  Anorexia:  0  Fatigue:  0  Insomnia:  0  Restlessness:  0  Agitation:  0         Performance Status:  10    Psychosocial/Cultural:   See Palliative Psychosocial Note: No  Pt has two daughters  and one son. Pt has 16 grandchildren. Pt and son live in [place]. Pt's daughters live in [facility].  Pt was drinking prior to hospitalization. 3 bottles of wine and 4 cans of beer per day.   **Primary  to Follow**  Palliative Care  Consult: Yes    Spiritual:  F - Monserrat and Belief:  Tyson  A - Address in Care:  Will ask chaplain Jeannette to visit.         Advance Care Planning  Advance Directives:   Living Will: No    Do Not Resuscitate Status: No    Medical Power of : No    Agent's Name:  Pt's two daughters and son at bedside.    Decision Making:  Patient answered questions  Goals of Care: The family endorses that what is most important right now is to focus on continuing treatment at this time,     Accordingly, we have decided that the best plan to meet the patient's goals includes continuing with treatment         Significant Labs: All pertinent labs within the past 24 hours have been reviewed.  CBC:   Recent Labs   Lab 12/03/24  0300   WBC 16.30*   HGB 6.8*   HCT 20.4*   MCV 79*        BMP:  Recent Labs   Lab 12/03/24  0300 12/03/24  0559    84  84    140  140   K 3.4* 3.6  3.6    104  104   CO2 20* 21*  21*   BUN 83* 83*  83*   CREATININE 2.3* 2.3*  2.3*   CALCIUM 7.8* 7.8*  7.8*   MG 2.2  --      LFT:  Lab Results   Component Value Date    AST 80 (H) 12/03/2024    ALKPHOS 149 12/03/2024    BILITOT 2.5 (H) 12/03/2024     Albumin:   Albumin   Date Value Ref Range Status   12/03/2024 1.1 (L) 3.5 - 5.2 g/dL Final     Protein:   Total Protein   Date Value Ref Range Status   12/03/2024 6.6 6.0 - 8.4 g/dL Final     Lactic acid:   Lab Results   Component Value Date    LACTATE 0.8 12/01/2024    LACTATE 1.0 11/27/2024       Significant Imaging: I have reviewed all pertinent imaging results/findings within the past 24 hours.    > 50% of 55  min visit spent in chart review, face to face discussion of goals of care,  symptom assessment, coordination  of care and emotional support     Lary De La O, CNS  Palliative Medicine  [facility] - [facility]

## 2024-12-03 NOTE — PROGRESS NOTES
[facility] - Medical ICU  Critical Care Medicine  Progress Note    Patient Name: Jose Hernández Sr.  MRN: [id number]  Admission Date: 11/23/2024  Hospital Length of Stay: 10 days  Code Status: Full Code  Attending Provider: Eduardo Garber MD  Primary Care Provider: Tong Cantrell MD   Principal Problem: Esophageal perforation    Subjective:     HPI:  Mr. Hernández is a 69yoM with a PMHx of alcohol use disorder, chronic back pain 2/2 to spinal stenosis, HLD, HTN, and CVA (on plavix) with no residual deficits who presented to OSH on 11/10/24 with epigastric pain and melena; found to have an esophageal perforation. The patient was initally hypotensive, anemic to 5.7, hyperbilirubinemia, lactic acidosis to 9.9. CTAP showed a pneumoperitoneum and pneumomediastinum consistent with a perforated viscus, hiatal hernia with surrounding external gas, and loculated pleural effusions. He was transfused and started on octreotide gtt, protonix, ativan, doxy, zosyn, hannah, vanc, precedex, with AC held. For the loculated pleural effusions, bilateral chest tubes were placed. Diagnostic laparoscopy showed hiatal hernia with gangrene in the lower thoracic esophagus and a perforated esophageal ulcer in the lower thoracic area, and mediastinal fluid pus, food, and pills. He underwent surgical repair on 11/10/24. Additionally, he was found to have HFrEF (EF = 20%).The patient was later extubated but then developed AMS. They repeated imaging with extraluminal air adjacent to the GE junction with increased fluid in the lower posterior mediastium concerning for recurrent esophageal perforation.   On 11/23/24, the patient was transferred to [facility] for AES and CTS consults s/t concern for perforated esophagus and loculated pleural effusions    Per chart review from OSH the patient stopped taking his medications a month before presentation because he thought they were making him weak. Other ROS positive for paleness for 3 weeks prior to  presentation. The patient provided supplemental history and reported that the patient was not eating or drinking except EtOH, drinks 2-3 bottles of wine and 2-4 beers per day.    Hospital/ICU Course:  Mr. Hernández is a 69yoM with PMHx significant for alcohol use disorder, HLD, HTN, and CVA (on plavix) who presented as a transfer for perforated esophageal ulcer. Bilateral chest tubes and bilateral KENNY drains. CT Chest/Abd/Pelvis: Esophageal leak into the paraesophageal posterior mediastinum and likely bilateral pleural spaces. Multifocal ground-glass opacities concerning for acute infectious or inflammatory process.  Small basilar pleural effusions and mild right lower lobe consolidation. ID, Thoracic Surgery, AES consulted. On 11/25, AES successfully placed an esophageal stent. Long-term nutrition will need to be addressed. Overnight with increased WOB and intermittent hypoxia. Chest xray with worsening patchy areas of alveolar consolidation; BNP 1484 - responsive to lasix.Intubated 11/27. CT concerning for ARDS. On vasopressors. CT chest and abdomen with contrast completed without evidence of active esophageal leak. Patient now on two pressors with high ventilator requirements. Palliative care consulted for assistance with ongoing C discussions with family.     Interval History/Significant Events: Patient with two episodes of sudden hypotension (70/40s) and bradycardia (lowest HR 28) what were slow to respond to uptitration of vasopressors. Family notified and at bedside. Shared concerns for continued decline and worsening of clinical status. Family unsure of how they want to proceed with Mr. Hernández's care, remains full code.     Review of Systems   Unable to perform ROS: Intubated     Objective:     Vital Signs (Most Recent):  Temp: 98.6 °F (37 °C) (12/03/24 1101)  Pulse: 76 (12/03/24 1517)  Resp: (!) 36 (12/03/24 1555)  BP: 117/65 (12/02/24 1130)  SpO2: 100 % (12/03/24 1517) Vital Signs (24h Range):  Temp:  [94.4  °F (34.7 °C)-99 °F (37.2 °C)] 98.6 °F (37 °C)  Pulse:  [57-80] 76  Resp:  [23-43] 36  SpO2:  [97 %-100 %] 100 %  Arterial Line BP: ()/(48-72) 122/67   Weight: 77.6 kg (171 lb 1.2 oz)  Body mass index is 27.61 kg/m².      Intake/Output Summary (Last 24 hours) at 12/3/2024 1604  Last data filed at 12/3/2024 1400  Gross per 24 hour   Intake 1947.4 ml   Output 1250 ml   Net 697.4 ml          Physical Exam  Constitutional:       Appearance: He is ill-appearing and toxic-appearing.      Interventions: He is sedated and intubated.   HENT:      Mouth/Throat:      Mouth: Mucous membranes are dry.   Eyes:      Pupils: Pupils are equal, round, and reactive to light.   Cardiovascular:      Rate and Rhythm: Normal rate and regular rhythm.      Pulses: Normal pulses.      Heart sounds: Normal heart sounds.   Pulmonary:      Effort: He is intubated.      Comments: Intubated on mechanical ventilation  Chest tubes x3 to suction.  Abdominal:      General: Bowel sounds are normal.      Palpations: Abdomen is soft.      Comments: KENNY drain x 2   Genitourinary:     Comments: Johansen catheter  Musculoskeletal:         General: Normal range of motion.   Skin:     General: Skin is warm and dry.      Coloration: Skin is pale.            Vents:  Vent Mode: A/C (12/03/24 1517)  Ventilator Initiated: Yes (11/27/24 1522)  Set Rate: 32 BPM (12/03/24 1517)  Vt Set: 385 mL (11/27/24 1522)  PEEP/CPAP: 10 cmH20 (12/03/24 1517)  Oxygen Concentration (%): 70 (12/03/24 1517)  Peak Airway Pressure: 37 cmH20 (12/03/24 1517)  Plateau Pressure: 27 cmH20 (12/03/24 1517)  Total Ve: 10.2 L/m (12/03/24 1517)  F/VT Ratio<105 (RSBI): (!) 96.68 (12/03/24 1517)  Lines/Drains/Airways       Central Venous Catheter Line  Duration             Percutaneous Central Line - Triple Lumen    Internal Jugular Right -- days              Drain  Duration                  Chest Tube   Left Midaxillary -- days         Chest Tube   Right Midaxillary -- days          Closed/Suction Drain   Right Abdomen Bulb -- days         Closed/Suction Drain  Left Abdomen Bulb -- days         Urethral Catheter 11/23/24 1313 Temperature probe 10 days         Chest Tube 11/29/24 1534 Tube - 3 Right Midaxillary;Pleural 14 Fr. 4 days         NG/OG Tube 11/30/24 1100 14 Fr. Left nostril 3 days              Airway  Duration                  Airway - Non-Surgical 11/27/24 1520 Endotracheal Tube 6 days              Arterial Line  Duration             Arterial Line 11/27/24 1810 Left Radial 5 days              Peripheral Intravenous Line  Duration                  Peripheral IV - Single Lumen 12/01/24 0400 20 G Anterior;Left Upper Arm 2 days         Peripheral IV - Single Lumen 12/01/24 1603 18 G 1 3/4 in No Anterior;Right Forearm 2 days                  Significant Labs:    CBC/Anemia Profile:  Recent Labs   Lab 12/02/24  0355 12/02/24  0406 12/02/24  1534 12/03/24  0300 12/03/24  1147   WBC 21.91*  --  20.26* 16.30*  --    HGB 6.3*  --  7.0* 6.8*  --    HCT 19.7*   < > 21.4* 20.4* 23*     --  218 223  --    MCV 82  --  81* 79*  --    RDW 22.7*  --  21.2* 21.0*  --     < > = values in this interval not displayed.        Chemistries:  Recent Labs   Lab 12/02/24  0355 12/02/24  1314 12/02/24  1802 12/02/24  2248 12/03/24  0300 12/03/24  0559 12/03/24  1305   *   < > 136  136 137 140 140  140 139   K 4.2   < > 3.8  3.8 3.7 3.4* 3.6  3.6 3.2*      < > 101  101 103 105 104  104 106   CO2 22*   < > 25  25 22* 20* 21*  21* 24   BUN 76*   < > 80*  80* 82* 83* 83*  83* 78*   CREATININE 2.5*   < > 2.2*  2.2* 2.2* 2.3* 2.3*  2.3* 2.2*   CALCIUM 8.4*   < > 8.1*  8.1* 7.9* 7.8* 7.8*  7.8* 7.7*   ALBUMIN 1.0*  --  1.1*  --  1.1*  --   --    PROT 7.0  --  6.9  --  6.6  --   --    BILITOT 2.2*  --  2.7*  --  2.5*  --   --    ALKPHOS 169*  --  146  --  149  --   --    ALT 23  --  26  --  26  --   --    AST 79*  --  82*  --  80*  --   --    MG 2.2  --  2.3 2.2 2.2  --   --    PHOS 7.5*   --   --  6.8* 6.7*  --   --     < > = values in this interval not displayed.       All pertinent labs within the past 24 hours have been reviewed.    Significant Imaging:  I have reviewed all pertinent imaging results/findings within the past 24 hours.    ABG  Recent Labs   Lab 12/03/24  1147   PH 7.417   PO2 140*   PCO2 39.4   HCO3 25.3   BE 1     Assessment/Plan:     Neuro  History of CVA (cerebrovascular accident)  CVA in 2012. No residual deficits.     - Neuro checks    Psychiatric  Alcohol use disorder  Per chart review, drinks 2-3 bottles of wine and 3-4 beers per day. Last drink around 11/8/24. Treated with Ativan at OSH.     - Liver ultrasound; without signs of portal HTN  - Monitor for s/s of withdrawal  - Seizure precautions  - Continue folic acid and thiamine    Pulmonary  Acute hypoxemic respiratory failure  Patient with Hypoxic Respiratory failure which is Acute.  he is not on home oxygen. Supplemental oxygen was provided and noted- Vent Mode: A/C  Oxygen Concentration (%):  [70] 70  Resp Rate Total:  [32 br/min-40 br/min] 32 br/min  PEEP/CPAP:  [10 cmH20] 10 cmH20  Mean Airway Pressure:  [20 cmH20] 20 cmH20  .   Signs/symptoms of respiratory failure include- tachypnea, increased work of breathing, and respiratory distress. Contributing diagnoses includes - Aspiration, CHF, Pleural effusion, and Pneumonia Labs and images were reviewed. Patient Has recent ABG, which has been reviewed. Will treat underlying causes and adjust management of respiratory failure as follows-     Overnight with increasing O2 requirements up to 10L HF NC. Increased WOB. Coarse breath sounds upon auscultation. Most recent chest xray with worsening patchy areas of alveolar consolidation. BNP 1484. Likely fluid volume overload vs. Aspiration. CT scan concerning for ARDS    - Duonebs for wheezing  - Maintains on BSA; linezolid/wilda/hannah  - Sputum culture with GNR  - Lung protective ventilation  - Wean oxygen for SpO2 >90%  - Daily  SAT/SBT  - ABG/VBG prn    Loculated pleural effusion  CT scans concerning for loculated pleural effusions; bilateral large-bore chest tubes inserted prior to transfer. Draining to suction.    --IR consulted, drainage of pleural collection 11/29 with CT placed  - CTS consulted; appreciate recs  - Maintain spO2 >90%  - Continue chest tubesx3 to suction    Cardiac/Vascular  Acute systolic heart failure  Reported EF of 15-20% at outside facility.  Hemodynamically stable.  Echo 11/25/24:    Left Ventricle: normal in size. Ventricular mass is normal. Normal wall thickness. There is concentric remodeling. Regional wall motion abnormalities present. Septal motion is abnormal. There is low normal systolic function with a visually estimated ejection fraction of 50 - 55%. There is indeterminate diastolic function.    Right Ventricle: Normal size and function    Left Atrium: Normal left atrial size.    Right Atrium: Normal - upper limits of normal right atrial size.    Aortic Valve: The aortic valve is a trileaflet valve. There is moderate aortic valve sclerosis. Aortic valve peak velocity is 1.4 m/s. Mean gradient is 5.9 mmHg.    Mitral Valve: The mitral valve is structurally normal. There is no significant regurgitation.    Tricuspid Valve: The tricuspid valve is structurally normal.    Pulmonic Valve: The pulmonic valve is structurally normal. There is no significant regurgitation.    Pulmonary Artery: The estimated pulmonary artery systolic pressure is 35 mmHg.    IVC/SVC: Normal venous pressure at 3 mmHg.    - Strict I/Os  - PRN Lasix dosing      Hyperlipidemia  Takes statin at home; holding in the setting of previous transaminitis.    - Daily CMP  - Resume medications as appropriate    Essential hypertension  History of HTN, had stopped taking home medications about a month prior to admission at OSH.    - Holding home meds in setting of two pressor shock    Renal/  Hyperkalemia  Resolved    ALFREDO (acute kidney  injury)  Admit creatinine 1.5. Baseline Cr: 0.9.    - Strict I/Os  - Daily CMP  - Avoid nephrotoxic agents  - Renally dose medications  - MAP > 65 and Hgb > 7 goal     ID  Septic shock  CT concerning for for multifocal PNA. Covered with Kade/Apple/Vanc.    - ID consulted; appreciate recs  - Continue Kade, Apple, and Linezolid   - Daily CBC  - Wean pressors to maintain MAP > 65  -- GNR in respiratory culture, candida parapsilosis and stenotrophomonas maltophilia in pleural fluid cx  -- Repeat BCx pending    GI  * Esophageal perforation  11/10/24: CT abdomen showed evidence of pneumomediastinum, pneumoperitoneum all concerning for perforated viscus.   - Robotic assisted laparoscopy performed. 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 Sicard).   - General surgery performed EGD immediately following laparoscopy. Report no evidence of intra-esophageal leakage. Bilateral KENNY drains placed.    11/23/24: Pt. Transferred to [facility] for higher level of care and AES/CTS consults.    - CTS consulted; appreciate recs. They feel he is too frail to undergo surgical management  - AES consulted; appreciate recs. Esophageal stent placed successfully on 11/25  - Repeat Esophogram completed 11/30 with no obvious large leak  - Continue protonix BID  - Monitor for bleeding; trend CBCs  - Transfuse for hgb <7  - Monitor KENNY drains    Palliative Care  Poor prognosis  -- Continues to decompensate on appropriate medical therapies  -- S/P multiple invasive interventions  -- Palliative care consulted, appreciate assistance       Critical Care Daily Checklist:    A: Awake: RASS Goal/Actual Goal: RASS Goal: -2-->light sedation  Actual:  -1   B: Spontaneous Breathing Trial Performed? Spon. Breathing Trial Initiated?: Not initiated (12/03/24 0739)   C: SAT & SBT Coordinated?  No                      D: Delirium: CAM-ICU Overall CAM-ICU: Positive   E: Early Mobility  Performed? No   F: Feeding Goal: Goals: Meet % EEN, EPN by RD f/u date  Status: Nutrition Goal Status: new   Current Diet Order   Procedures    Diet NPO      AS: Analgesia/Sedation Fentanyl and propofol infusion    T: Thromboembolic Prophylaxis Heparin    H: HOB > 300 Yes   U: Stress Ulcer Prophylaxis (if needed) PPI   G: Glucose Control SSI   B: Bowel Function Stool Occurrence: 1   I: Indwelling Catheter (Lines & Johansen) Necessity CVC, Johansen,    D: De-escalation of Antimicrobials/Pharmacotherapies Continue Abx    Plan for the day/ETD Ongoing GOC  Wean vasopressors as tolerated     Code Status:  Family/Goals of Care: Full Code  POC and GOC discussed with daughters at bedside     Critical Care Time: 70 minutes  Critical secondary to Patient has a condition that poses threat to life and bodily function: Esophageal perforation, shock, respiratory failure      Critical care was time spent personally by me on the following activities: development of treatment plan with patient or surrogate and bedside caregivers, discussions with consultants, evaluation of patient's response to treatment, examination of patient, ordering and performing treatments and interventions, ordering and review of laboratory studies, ordering and review of radiographic studies, pulse oximetry, re-evaluation of patient's condition. This critical care time did not overlap with that of any other provider or involve time for any procedures.     Kathryn Mckinney-Roxana, EDEL  Critical Care Medicine  [facility] - Medical ICU

## 2024-12-03 NOTE — ASSESSMENT & PLAN NOTE
-S/p EGD 11/25 with large distal defect- stented  -WBC of 14 from 20;  currently afebrile  -He is currently critically ill in the ICU requiring intubation and pressors- Palliative medicine is having ongoing conversation with family regarding GOC and possible comfort care  -If the patient is eventually able to be extubated and improves- can proceed with esophagram to evaluate the esophagus for ongoing leaks/stent placement  - If no leak seen on future esophagram, could consider no more than a liquid diet for nutrition.   - If enteral nutrition is desired, the patient does have a known pyloric stenosis so could consider review with surgery for surgical jejunostomy or IR guided G or GJ tube. Otherwise, patient might require temporary TPN until its determined whether there is ongoing esophageal leak or not.   -Repeat upper endoscopy with fluoro in 4-6 weeks from stent placement at main campus only for partially covered esophageal stent removal/probable exchange   -AES will follow peripherally, please reach out with any questions/concerns

## 2024-12-03 NOTE — ASSESSMENT & PLAN NOTE
Impression: Pt is a  70 y/o male with PMHx of alcohol use disorder, chronic back pain 2/2 to spinal stenosis, HLD, HTN, and CVA (on plavix) with no residual deficits who presented to OSH on 11/10/24 with epigastric pain and melena. Pt had esophageal perforation with complicated course. Pt is intubated on pressor support, septic shock, ARF, EF15-20%. Pt is sedated.  Pt is full code.     Reason for consult: GOC/ACP. Spoke to MICHELLE Das.     Goals of care/ACP:     Today: No family at bedside. Spoke to pt's bedside nurse. Recommend family meeting with pt's son and daughters with primary team/pal team. Pt still on vent support and pressor support.     12/2/24  Met with one of pt's daughters and granddaughter. Pt has 2 daughters, one son, and 16 grandchildren. Per pt's daughter, she and her sister are aware of severity of pt's illness and poor prognosis. Per daughter, pt was an active man prior to hospitalization and this woul dnot be a good QOL for pt. Per daughter and granddaughter, pt's son having a hard time dealing with pt being in hospital and wants to continue with treatment. Per pt's daughter, daughters are thinking about comfort care but want pt's son to be accepting of this to. Per pt's daughter, she is not sure if he will ever be. Comfort care discussed. Code status discussed. Per daughter, son wants pt to remain full code. At this time, plan is to continue medical management.     Code status: Full  MPOA: Pt's two daughters and son.     Symptom management:   Pain:   Pt on fentanyl drip.     No distress noted.     Respiratory failure:   Pt intubated, on sedation.     Plan:   Will continue to follow.

## 2024-12-03 NOTE — PROGRESS NOTES
[facility] - [facility] ICU  Gastroenterology  Progress Note    Patient Name: Jose Hernández Sr.  MRN: [id number]  Admission Date: 11/23/2024  Hospital Length of Stay: 10 days  Code Status: DNR   Attending Provider: Eduardo Garber MD  Consulting Provider: Mayur Miles PA-C  Primary Care Physician: Tong Cantrell MD  Principal Problem: Esophageal perforation        Subjective:     HPI: Jose Hernández Sr. is a 68 yo M w/ CHF (EF 20%), chronic NSAID use, alcohol dependence, spinal stenosis, CVA who was admitted to outside hospital on 11/10/2024 with epigastric pain, nausea, vomiting, diarrhea, melena.  He was found to be anemic with a hemoglobin of 5.7, and vitals with blood pressure 70s over 40s, pulse 110 and a lactic acid of 9.9.  CT abdomen showed evidence of pneumomediastinum, pneumoperitoneum with concern for perforated viscus.  Patient was taken to surgery and had a robotic assisted laparotomy performed with bilateral chest tube placement and bilateral intra-abdominal drain placement on 11/10.  They noted gangrene of the lower thoracic esophagus and perforation of an esophageal ulcer which were repaired.  General surgery also did an EGD right after the laparoscopy and found no evidence of intra esophageal leakage.  However patient then went into acute hemorrhagic shock, acute hypoxic respiratory failure and admitted to the ICU.  He was intubated and on pressors and antibiotics but has since been extubated and off pressors and not tolerating NG tube feeds.  Discussion with CT surgery resulted in recommendation of transferring to [facility] [place] for further evaluation.  AES consulted for possible stent placement if necessary. s/p EGD 11/25which demonstrated large esophageal perforation- stent placed. Was showing increased respiratory requirements. He developed ARDS subsequently and is now intubated     Interval History:  Patient with two episodes of sudden hypotension (70/40s) and bradycardia (lowest HR 28) what were  slow to respond to uptitration of vasopressors. Family notified and at bedside. Shared concerns for continued decline and worsening of clinical status. Family unsure of how they want to proceed with Mr. Hernández's care, remains full code.     Continues to be intubated and mechanically ventilated. CT with contrast 11/30 showed stable position of esophageal stent and perigastric heterogeneous collection measuring approximately 5.2 x 7.1 cm along the lesser curvature of the stomach. Perigastric fluid collection not accessible for drainage, but went for IR drainage of R sided pleural fluid collection. WBC 14.92 from 20.26.  currently afebrile. Patient is overall very ill and palliative medicine are having ongoing conversations with family regarding GOC and possible comfort care.    Review of Systems  Objective:     Vital Signs (Most Recent):  Temp: 98.6 °F (37 °C) (12/03/24 1101)  Pulse: 76 (12/03/24 1517)  Resp: (!) 36 (12/03/24 1555)  BP: 117/65 (12/02/24 1130)  SpO2: 100 % (12/03/24 1517) Vital Signs (24h Range):  Temp:  [94.4 °F (34.7 °C)-99 °F (37.2 °C)] 98.6 °F (37 °C)  Pulse:  [57-80] 76  Resp:  [23-43] 36  SpO2:  [97 %-100 %] 100 %  Arterial Line BP: ()/(48-72) 122/67     Weight: 77.6 kg (171 lb 1.2 oz) (11/26/24 1056)  Body mass index is 27.61 kg/m².      Intake/Output Summary (Last 24 hours) at 12/3/2024 1721  Last data filed at 12/3/2024 1400  Gross per 24 hour   Intake 1876.57 ml   Output 1250 ml   Net 626.57 ml       Lines/Drains/Airways       Central Venous Catheter Line  Duration             Percutaneous Central Line - Triple Lumen    Internal Jugular Right -- days              Drain  Duration                  Chest Tube   Left Midaxillary -- days         Chest Tube   Right Midaxillary -- days         Closed/Suction Drain   Right Abdomen Bulb -- days         Closed/Suction Drain  Left Abdomen Bulb -- days         Urethral Catheter 11/23/24 1313 Temperature probe 10 days         Chest Tube 11/29/24  1534 Tube - 3 Right Midaxillary;Pleural 14 Fr. 4 days         NG/OG Tube 11/30/24 1100 14 Fr. Left nostril 3 days              Airway  Duration                  Airway - Non-Surgical 11/27/24 1520 Endotracheal Tube 6 days              Arterial Line  Duration             Arterial Line 11/27/24 1810 Left Radial 5 days              Peripheral Intravenous Line  Duration                  Peripheral IV - Single Lumen 12/01/24 0400 20 G Anterior;Left Upper Arm 2 days         Peripheral IV - Single Lumen 12/01/24 1603 18 G 1 3/4 in No Anterior;Right Forearm 2 days                     Physical Exam  Constitutional:       Appearance: He is ill-appearing and toxic-appearing.      Comments: Intubated and sedated   Pulmonary:      Comments: Mechanically ventilated. Bilateral chest tubes, two on right and one on left with ss output. Continued air leak from right sided large bore chest tube  Abdominal:      General: Abdomen is flat. Bowel sounds are normal. There is no distension.      Palpations: Abdomen is soft.      Comments: Bilateral KENNY drains with serosanguinous output           Significant Labs:  CBC:   Recent Labs   Lab 12/02/24  1534 12/03/24  0300 12/03/24  1147 12/03/24  1645   WBC 20.26* 16.30*  --  14.92*   HGB 7.0* 6.8*  --  8.2*   HCT 21.4* 20.4* 23* 24.4*    223  --  224     CMP:   Recent Labs   Lab 12/03/24  0300 12/03/24  0559 12/03/24  1305      < > 81   CALCIUM 7.8*   < > 7.7*   ALBUMIN 1.1*  --   --    PROT 6.6  --   --       < > 139   K 3.4*   < > 3.2*   CO2 20*   < > 24      < > 106   BUN 83*   < > 78*   CREATININE 2.3*   < > 2.2*   ALKPHOS 149  --   --    ALT 26  --   --    AST 80*  --   --    BILITOT 2.5*  --   --     < > = values in this interval not displayed.     Liver Function Test:   Recent Labs   Lab 12/02/24  0355 12/02/24  1802 12/03/24  0300   ALT 23 26 26   AST 79* 82* 80*   ALKPHOS 169* 146 149   BILITOT 2.2* 2.7* 2.5*   PROT 7.0 6.9 6.6   ALBUMIN 1.0* 1.1* 1.1*          Significant Imaging:  Imaging results within the past 24 hours have been reviewed.  Assessment/Plan:     GI  * Esophageal perforation  -S/p EGD 11/25 with large distal defect- stented  -WBC of 14 from 20;  currently afebrile  -He is currently critically ill in the ICU requiring intubation and pressors- Palliative medicine is having ongoing conversation with family regarding GOC and possible comfort care  -If the patient is eventually able to be extubated and improves- can proceed with esophagram to evaluate the esophagus for ongoing leaks/stent placement  - If no leak seen on future esophagram, could consider no more than a liquid diet for nutrition.   - If enteral nutrition is desired, the patient does have a known pyloric stenosis so could consider review with surgery for surgical jejunostomy or IR guided G or GJ tube. Otherwise, patient might require temporary TPN until its determined whether there is ongoing esophageal leak or not.   -Repeat upper endoscopy with fluoro in 4-6 weeks from stent placement at main campus only for partially covered esophageal stent removal/probable exchange   -AES will follow peripherally, please reach out with any questions/concerns        Thank you for your consult. I will follow-up with patient. Please contact us if you have any additional questions.    Mayur Miles PA-C  Gastroenterology  [facility] - [facility] ICU

## 2024-12-03 NOTE — SUBJECTIVE & OBJECTIVE
Subjective:     HPI: Jose Hernández Sr. is a 70 yo M w/ CHF (EF 20%), chronic NSAID use, alcohol dependence, spinal stenosis, CVA who was admitted to outside hospital on 11/10/2024 with epigastric pain, nausea, vomiting, diarrhea, melena.  He was found to be anemic with a hemoglobin of 5.7, and vitals with blood pressure 70s over 40s, pulse 110 and a lactic acid of 9.9.  CT abdomen showed evidence of pneumomediastinum, pneumoperitoneum with concern for perforated viscus.  Patient was taken to surgery and had a robotic assisted laparotomy performed with bilateral chest tube placement and bilateral intra-abdominal drain placement on 11/10.  They noted gangrene of the lower thoracic esophagus and perforation of an esophageal ulcer which were repaired.  General surgery also did an EGD right after the laparoscopy and found no evidence of intra esophageal leakage.  However patient then went into acute hemorrhagic shock, acute hypoxic respiratory failure and admitted to the ICU.  He was intubated and on pressors and antibiotics but has since been extubated and off pressors and not tolerating NG tube feeds.  Discussion with CT surgery resulted in recommendation of transferring to [facility] [place] for further evaluation.  AES consulted for possible stent placement if necessary. s/p EGD 11/25which demonstrated large esophageal perforation- stent placed. Was showing increased respiratory requirements. He developed ARDS subsequently and is now intubated     Interval History:  Patient with two episodes of sudden hypotension (70/40s) and bradycardia (lowest HR 28) what were slow to respond to uptitration of vasopressors. Family notified and at bedside. Shared concerns for continued decline and worsening of clinical status. Family unsure of how they want to proceed with Mr. Hernández's care, remains full code.     Continues to be intubated and mechanically ventilated. CT with contrast 11/30 showed stable position of esophageal stent and  perigastric heterogeneous collection measuring approximately 5.2 x 7.1 cm along the lesser curvature of the stomach. Perigastric fluid collection not accessible for drainage, but went for IR drainage of R sided pleural fluid collection. WBC 14.92 from 20.26.  currently afebrile. Patient is overall very ill and palliative medicine are having ongoing conversations with family regarding GOC and possible comfort care.    Review of Systems  Objective:     Vital Signs (Most Recent):  Temp: 98.6 °F (37 °C) (12/03/24 1101)  Pulse: 76 (12/03/24 1517)  Resp: (!) 36 (12/03/24 1555)  BP: 117/65 (12/02/24 1130)  SpO2: 100 % (12/03/24 1517) Vital Signs (24h Range):  Temp:  [94.4 °F (34.7 °C)-99 °F (37.2 °C)] 98.6 °F (37 °C)  Pulse:  [57-80] 76  Resp:  [23-43] 36  SpO2:  [97 %-100 %] 100 %  Arterial Line BP: ()/(48-72) 122/67     Weight: 77.6 kg (171 lb 1.2 oz) (11/26/24 1056)  Body mass index is 27.61 kg/m².      Intake/Output Summary (Last 24 hours) at 12/3/2024 1721  Last data filed at 12/3/2024 1400  Gross per 24 hour   Intake 1876.57 ml   Output 1250 ml   Net 626.57 ml       Lines/Drains/Airways       Central Venous Catheter Line  Duration             Percutaneous Central Line - Triple Lumen    Internal Jugular Right -- days              Drain  Duration                  Chest Tube   Left Midaxillary -- days         Chest Tube   Right Midaxillary -- days         Closed/Suction Drain   Right Abdomen Bulb -- days         Closed/Suction Drain  Left Abdomen Bulb -- days         Urethral Catheter 11/23/24 1313 Temperature probe 10 days         Chest Tube 11/29/24 1534 Tube - 3 Right Midaxillary;Pleural 14 Fr. 4 days         NG/OG Tube 11/30/24 1100 14 Fr. Left nostril 3 days              Airway  Duration                  Airway - Non-Surgical 11/27/24 1520 Endotracheal Tube 6 days              Arterial Line  Duration             Arterial Line 11/27/24 1810 Left Radial 5 days              Peripheral Intravenous Line  Duration                   Peripheral IV - Single Lumen 12/01/24 0400 20 G Anterior;Left Upper Arm 2 days         Peripheral IV - Single Lumen 12/01/24 1603 18 G 1 3/4 in No Anterior;Right Forearm 2 days                     Physical Exam  Constitutional:       Appearance: He is ill-appearing and toxic-appearing.      Comments: Intubated and sedated   Pulmonary:      Comments: Mechanically ventilated. Bilateral chest tubes, two on right and one on left with ss output. Continued air leak from right sided large bore chest tube  Abdominal:      General: Abdomen is flat. Bowel sounds are normal. There is no distension.      Palpations: Abdomen is soft.      Comments: Bilateral KENNY drains with serosanguinous output           Significant Labs:  CBC:   Recent Labs   Lab 12/02/24  1534 12/03/24  0300 12/03/24  1147 12/03/24  1645   WBC 20.26* 16.30*  --  14.92*   HGB 7.0* 6.8*  --  8.2*   HCT 21.4* 20.4* 23* 24.4*    223  --  224     CMP:   Recent Labs   Lab 12/03/24  0300 12/03/24  0559 12/03/24  1305      < > 81   CALCIUM 7.8*   < > 7.7*   ALBUMIN 1.1*  --   --    PROT 6.6  --   --       < > 139   K 3.4*   < > 3.2*   CO2 20*   < > 24      < > 106   BUN 83*   < > 78*   CREATININE 2.3*   < > 2.2*   ALKPHOS 149  --   --    ALT 26  --   --    AST 80*  --   --    BILITOT 2.5*  --   --     < > = values in this interval not displayed.     Liver Function Test:   Recent Labs   Lab 12/02/24  0355 12/02/24  1802 12/03/24  0300   ALT 23 26 26   AST 79* 82* 80*   ALKPHOS 169* 146 149   BILITOT 2.2* 2.7* 2.5*   PROT 7.0 6.9 6.6   ALBUMIN 1.0* 1.1* 1.1*         Significant Imaging:  Imaging results within the past 24 hours have been reviewed.

## 2024-12-03 NOTE — ASSESSMENT & PLAN NOTE
Reported EF of 15-20% at outside facility.  Hemodynamically stable.  Echo 11/25/24:    Left Ventricle: normal in size. Ventricular mass is normal. Normal wall thickness. There is concentric remodeling. Regional wall motion abnormalities present. Septal motion is abnormal. There is low normal systolic function with a visually estimated ejection fraction of 50 - 55%. There is indeterminate diastolic function.    Right Ventricle: Normal size and function    Left Atrium: Normal left atrial size.    Right Atrium: Normal - upper limits of normal right atrial size.    Aortic Valve: The aortic valve is a trileaflet valve. There is moderate aortic valve sclerosis. Aortic valve peak velocity is 1.4 m/s. Mean gradient is 5.9 mmHg.    Mitral Valve: The mitral valve is structurally normal. There is no significant regurgitation.    Tricuspid Valve: The tricuspid valve is structurally normal.    Pulmonic Valve: The pulmonic valve is structurally normal. There is no significant regurgitation.    Pulmonary Artery: The estimated pulmonary artery systolic pressure is 35 mmHg.    IVC/SVC: Normal venous pressure at 3 mmHg.    - Strict I/Os  - PRN Lasix dosing

## 2024-12-03 NOTE — PROGRESS NOTES
[facility] - Medical ICU  Thoracic Surgery  Progress Note    Subjective:     History of Present Illness:  Mr. Hernández is a 69 yoM with PMH significant for chronic alcohol abuse and NSAID use who suffered a perforated esophageal ulcer and underwent promary repair with bilateral chest tube placement and bilateral intraabdominal drain placement on 11/10 at an [facility]. He was transferred here for concern of persistent leak and potential AES, IR, and CTS intervention. Of note, his EF was found to be 20% on [facility] echo. He was transferred to the MICU.     Post-Op Info:  Procedure(s) (LRB):  EGD (ESOPHAGOGASTRODUODENOSCOPY) (N/A)   8 Days Post-Op     Interval History: Mr. Hernández's levophed requirements continue to wax and wane, now up to 0.04 this AM. Remains on vaso. Stable vent setting CT with continued leak.     Medications:  Continuous Infusions:   D10W   Intravenous Continuous PRN        fentanyl  0-250 mcg/hr Intravenous Continuous 20 mL/hr at 12/03/24 0501 200 mcg/hr at 12/03/24 0501    NORepinephrine bitartrate-D5W  0-3 mcg/kg/min Intravenous Continuous 7.3 mL/hr at 12/03/24 0501 0.05 mcg/kg/min at 12/03/24 0501    propofoL  0-50 mcg/kg/min Intravenous Continuous 7 mL/hr at 12/03/24 0501 15 mcg/kg/min at 12/03/24 0501    vasopressin  0.04 Units/min Intravenous Continuous 12 mL/hr at 12/03/24 0501 0.04 Units/min at 12/03/24 0501     Scheduled Meds:   heparin (porcine)  5,000 Units Subcutaneous Q8H    linezolid  600 mg Intravenous Q12H    meropenem IV (PEDS and ADULTS)  2 g Intravenous Q12H    micafungin  100 mg Intravenous Q24H    mupirocin   Nasal BID    pantoprazole  40 mg Intravenous BID     PRN Meds:  Current Facility-Administered Medications:     0.9%  NaCl infusion (for blood administration), , Intravenous, Q24H PRN    acetaminophen, 650 mg, Rectal, Q6H PRN    D10W, , Intravenous, Continuous PRN    dextrose 10%, 12.5 g, Intravenous, PRN    dextrose 10%, 25 g, Intravenous, PRN    fentanyl, 50 mcg, Intravenous, Q30 Min  PRN    glucagon (human recombinant), 1 mg, Intramuscular, PRN    HYDROmorphone, 0.5 mg, Intravenous, Q6H PRN    insulin aspart U-100, 0-5 Units, Subcutaneous, Q4H PRN    iohexol, 15 mL, Oral, PRN    sodium chloride 0.9%, 10 mL, Intravenous, PRN     Review of patient's allergies indicates:  No Known Allergies  Objective:     Vital Signs (Most Recent):  Temp: 98.5 °F (36.9 °C) (12/03/24 0525)  Pulse: 72 (12/03/24 0739)  Resp: (!) 32 (12/03/24 0739)  BP: 117/65 (12/02/24 1130)  SpO2: 100 % (12/03/24 0739) Vital Signs (24h Range):  Temp:  [94.4 °F (34.7 °C)-98.6 °F (37 °C)] 98.5 °F (36.9 °C)  Pulse:  [57-77] 72  Resp:  [23-32] 32  SpO2:  [88 %-100 %] 100 %  BP: (109-124)/(59-67) 117/65  Arterial Line BP: ()/(48-76) 97/56     Intake/Output - Last 3 Shifts         12/01 0700  12/02 0659 12/02 0700 12/03 0659 12/03 0700 12/04 0659    P.O. 0      I.V. (mL/kg) 1692.3 (21.8) 994.2 (12.8)     Blood  285     NG/GT 85 30     IV Piggyback 991.1 961     TPN 1072.8 320.1     Total Intake(mL/kg) 3841.2 (49.5) 2590.3 (33.4)     Urine (mL/kg/hr) 4100 (2.2) 2395 (1.3)     Drains 10 0     Stool  0     Chest Tube 155 150     Total Output 4265 2545     Net -423.8 +45.3            Stool Occurrence  1 x             SpO2: 100 %        Physical Exam  Vitals reviewed.   Constitutional:       Appearance: He is ill-appearing.   HENT:      Head: Normocephalic.      Mouth/Throat:      Comments: ETT  Pulmonary:      Comments: Mechanically ventilated. Bilateral chest tubes, two on right and one on left with ss output. Continued air leak from right sided large bore chest tube  Abdominal:      General: There is no distension.      Palpations: Abdomen is soft.      Comments: Bilateral KENNY drains with serosanguinous output   Genitourinary:     Comments: douglass  Skin:     General: Skin is warm.      Findings: No rash.            Significant Labs:  All pertinent labs from the last 24 hours have been reviewed.    Significant Diagnostics:  I have  reviewed and interpreted all pertinent imaging results/findings within the past 24 hours.    VTE Risk Mitigation (From admission, onward)           Ordered     heparin (porcine) injection 5,000 Units  Every 8 hours         11/26/24 0851                  Assessment/Plan:     * Esophageal perforation  69 yoM s/p primary [facility] perfor[facility] at [facility]. Transferred for concern of ongoing leak. No access to outside imaging at this time. Overall clinically stable in the MICU. Not on pressors. Minimal respiratory support. Case discussed with Dr. Goldberg and reviewed with family. Patient is quite frail with poor cardiac function and would not tolerate surgical intervention well, therefore minimally invasive procedures would be preferable.    - underwent stent with AES, large defect near GEJ  - unfortunately, there are few surgical options for this patient as he has already failed attempted surgical repair. And with recent decompensation, he would not tolerate an intervention even if one were indicated at this time  - CT shows no enlarging fluid accumulations with drains in appropriate position. And there is no obvious large leak on CT with contrast through NGT 11/30/2024   - Care per MICU  - Thoracic surgery will follow peripherally        Morgan Naranjo MD  Thoracic Surgery  [facility] - Medical ICU

## 2024-12-03 NOTE — PLAN OF CARE
MICU DAILY GOALS     Family/Goals of care/Code Status   Code Status: Full Code    24H Vital Sign Range  Temp:  [97.5 °F (36.4 °C)-98.8 °F (37.1 °C)]   Pulse:  [59-80]   Resp:  [16-35]   BP: (109-124)/(59-67)   SpO2:  [99 %-100 %]   Arterial Line BP: ()/(47-76)      Shift Events (include procedures and significant events)  Palliative saw patient and family with ongoing GOC. Pt's blood pressure labile on pressors. Team is aware. EKG obtained towards end of shift with concern for ST elevation. Troponin sent.     AWAKE RASS: Goal - RASS Goal: -2-->light sedation  Actual - RASS ([place] Agitation-Sedation Scale): deep sedation    Restraint necessity: Not necessary   BREATHE SBT: Not attempted    Coordinate A & B, analgesics/sedatives Pain: managed   SAT: Not attempted   Delirium CAM-ICU: Overall CAM-ICU: Positive   Early(intubated/ Progressive (non-intubated) Mobility MOVE Screen (INTUBATED ONLY): Fail    Activity: Activity Management: Rolling - L1   Feeding/Nutrition Diet order: Diet/Nutrition Received: NPO,     Thrombus DVT prophylaxis: VTE Core Measure: Pharmacological prophylaxis initiated/maintained   HOB Elevation Head of Bed (HOB) Positioning: HOB at 30-45 degrees   Ulcer Prophylaxis GI: yes   Glucose control managed Glycemic Management: blood glucose monitored   Skin Skin assessment:     Sacrum intact/not altered? Yes  Heels intact/not altered? Yes  Surgical wound? Yes    CHECK ONE!   (no altered skin or altered skin) and sub boxes:  [] No Altered Skin Integrity Present    []Prevention Measures Documented    [x] Altered Skin Integrity Present or Discovered   [x] LDA present in EPIC, daily doc completed              [] LDA added if not in EPIC (describe wound).                    When describing wound, do not stage, use descriptive words only.    [] Wound Image Taken (required on admit,                   transfer/discharge and every Tuesday)    Wound Care Consulted? No   Bowel Function no issues     Indwelling Catheter Necessity      Urethral Catheter 11/23/24 1313 Temperature probe-Reason for Continuing Urinary Catheterization: Critically ill in ICU and requiring hourly monitoring of intake/output  [REMOVED]      Urethral Catheter  -Reason for Continuing Urinary Catheterization: Critically ill in ICU and requiring hourly monitoring of intake/output    Percutaneous Central Line - Triple Lumen    Internal Jugular Right-Line Necessity Review: Hemodynamic instability     De-escalation Antibiotics No        Problem: Adult Inpatient Plan of Care  Goal: Plan of Care Review  Outcome: Progressing  Goal: Optimal Comfort and Wellbeing  Outcome: Progressing     Problem: Infection  Goal: Absence of Infection Signs and Symptoms  Outcome: Progressing     Problem: Sepsis/Septic Shock  Goal: Blood Glucose Level Within Targeted Range  Outcome: Progressing

## 2024-12-03 NOTE — CHAPLAIN
"Palliative Care        Patient: Jose Hernández Sr.  MRN: [id number]  : 1955  Age: 69 y.o.  Hospital Length of Stay: 10  Code Status: Code Status Discussion Note  Attending Provider: Eduardo Garber MD  Principal Problem: Esophageal perforation     What prompted this initial visit?:   Follow up visit to [facility] pt; 10 min visit     Who was present during my visit:   Pt alone at time of visit, spoke to bedside nurse for update; family is on their way     Non-clinical observations of patient/family or room:  Pt intubated and sedated/sleeping, appeared to be comfortable as could be.     Feelings/Family (Emotions/Fears/Experiences/Coping):   Unable to assess of pt. Bedside nurse shared his impression of the family dynamics which is in sync with what I learned from one daughter yesterday and from the [facility] team.   Provided compassionate presence and tender touch as I spoke to pt-- as if he hears and understands everything; told pt he is loved and being well taken care of and that we will continue to care for him and support his nate family.     Monserrat (Beliefs/Meaning/Philosophy/Distress):   Pt and family are Alevism. Daughter Whit yesterday spoke much about "God's will", "God is in control", etc.      Future (Spiritual Care Plan of Action):   Palliative  will continue to follow. , in your mercy.        To [facility] Staff:   Educated the patient/family regarding the services offered by the Spiritual Care team in my absence as the specialized Palliative Care  ([id number])  My hours are 7:30a-4:00p M-F, but Spiritual Care Chaplains are available /.  I'm usually the first point of contact for palliative care patients, but any  is able to help.  Evening, overnight and weekends, please dial [phone] which is carried by an in-house Spiritual Care  at all hours.         Rev Jason. Jeannette Rader MDiv, [facility]  Board Certified Palliative Care "   Palliative Medicine Department, 9th Floor Clinic Tower [facility] [place]     My SpectraLink: [email or web address]   Office: [phone]  ** If you call and I don't answer, please leave a voicemail because vmail is forwarded to me  Email: [email or web address]  Work hours: M-F [phone]     The care I provide is shaped by the Code of Ethics of the [employer] Professional Chaplains

## 2024-12-03 NOTE — SUBJECTIVE & OBJECTIVE
Interval History: Pt intubated on sedation    Past Medical History:   Diagnosis Date    Arthritis     Gout     Hyperlipemia     Hypertension     Stroke 2012       Past Surgical History:   Procedure Laterality Date    ESOPHAGOGASTRODUODENOSCOPY N/A 11/25/2024    Procedure: EGD (ESOPHAGOGASTRODUODENOSCOPY);  Surgeon: Som Balbuena MD;  Location: [address]);  Service: Endoscopy;  Laterality: N/A;    HAND SURGERY      SKIN GRAFT         Review of patient's allergies indicates:  No Known Allergies    Medications:  Continuous Infusions:   D10W   Intravenous Continuous PRN 25 mL/hr at 12/03/24 0808 New Bag at 12/03/24 0808    fentanyl  0-250 mcg/hr Intravenous Continuous 20 mL/hr at 12/03/24 0501 200 mcg/hr at 12/03/24 0501    NORepinephrine bitartrate-D5W  0-3 mcg/kg/min Intravenous Continuous 7.3 mL/hr at 12/03/24 0501 0.05 mcg/kg/min at 12/03/24 0501    propofoL  0-50 mcg/kg/min Intravenous Continuous 7 mL/hr at 12/03/24 0501 15 mcg/kg/min at 12/03/24 0501    vasopressin  0.04 Units/min Intravenous Continuous 12 mL/hr at 12/03/24 0501 0.04 Units/min at 12/03/24 0501     Scheduled Meds:   heparin (porcine)  5,000 Units Subcutaneous Q8H    linezolid  600 mg Intravenous Q12H    meropenem IV (PEDS and ADULTS)  2 g Intravenous Q12H    micafungin  100 mg Intravenous Q24H    mupirocin   Nasal BID    pantoprazole  40 mg Intravenous BID     PRN Meds:  Current Facility-Administered Medications:     0.9%  NaCl infusion (for blood administration), , Intravenous, Q24H PRN    acetaminophen, 650 mg, Rectal, Q6H PRN    D10W, , Intravenous, Continuous PRN    dextrose 10%, 12.5 g, Intravenous, PRN    dextrose 10%, 25 g, Intravenous, PRN    fentanyl, 50 mcg, Intravenous, Q30 Min PRN    glucagon (human recombinant), 1 mg, Intramuscular, PRN    HYDROmorphone, 0.5 mg, Intravenous, Q6H PRN    insulin aspart U-100, 0-5 Units, Subcutaneous, Q4H PRN    iohexol, 15 mL, Oral, PRN    sodium chloride 0.9%, 10 mL, Intravenous,  PRN    Family History       Problem Relation (Age of Onset)    Hypertension Father    No Known Problems Mother          Tobacco Use    Smoking status: Never    Smokeless tobacco: Never   Substance and Sexual Activity    Alcohol use: Yes    Drug use: Yes     Types: Marijuana    Sexual activity: Yes       Review of Systems   Unable to perform ROS: Acuity of condition     Objective:     Vital Signs (Most Recent):  Temp: 98.5 °F (36.9 °C) (12/03/24 0525)  Pulse: 72 (12/03/24 0829)  Resp: (!) 32 (12/03/24 0739)  BP: 117/65 (12/02/24 1130)  SpO2: 100 % (12/03/24 0739) Vital Signs (24h Range):  Temp:  [94.4 °F (34.7 °C)-98.5 °F (36.9 °C)] 98.5 °F (36.9 °C)  Pulse:  [57-77] 72  Resp:  [23-32] 32  SpO2:  [88 %-100 %] 100 %  BP: (117)/(65) 117/65  Arterial Line BP: ()/(48-76) 97/56     Weight: 77.6 kg (171 lb 1.2 oz)  Body mass index is 27.61 kg/m².       Physical Exam  Constitutional:       Interventions: He is intubated.      Comments: On sedation, intubated   HENT:      Head: Normocephalic and atraumatic.   Cardiovascular:      Comments: On pressor support  Pulmonary:      Effort: He is intubated.   Skin:     General: Skin is warm and dry.   Neurological:      Comments: On sedation            Review of Symptoms      Symptom Assessment (ESAS 0-10 Scale)  Pain:  0  Dyspnea:  0  Anxiety:  0  Nausea:  0  Depression:  0  Anorexia:  0  Fatigue:  0  Insomnia:  0  Restlessness:  0  Agitation:  0         Performance Status:  10    Psychosocial/Cultural:   See Palliative Psychosocial Note: No  Pt has two daughters and one son. Pt has 16 grandchildren. Pt and son live in [place]. Pt's daughters live in [facility].  Pt was drinking prior to hospitalization. 3 bottles of wine and 4 cans of beer per day.   **Primary  to Follow**  Palliative Care  Consult: Yes    Spiritual:  F - Monserrat and Belief:  Christianity  A - Address in Care:  Will ask chaplain Jeannette to visit.         Advance Care Planning    Advance Directives:   Living Will: No    Do Not Resuscitate Status: No    Medical Power of : No    Agent's Name:  Pt's two daughters and son at bedside.    Decision Making:  Patient answered questions  Goals of Care: The family endorses that what is most important right now is to focus on continuing treatment at this time,     Accordingly, we have decided that the best plan to meet the patient's goals includes continuing with treatment         Significant Labs: All pertinent labs within the past 24 hours have been reviewed.  CBC:   Recent Labs   Lab 12/03/24  0300   WBC 16.30*   HGB 6.8*   HCT 20.4*   MCV 79*        BMP:  Recent Labs   Lab 12/03/24  0300 12/03/24  0559    84  84    140  140   K 3.4* 3.6  3.6    104  104   CO2 20* 21*  21*   BUN 83* 83*  83*   CREATININE 2.3* 2.3*  2.3*   CALCIUM 7.8* 7.8*  7.8*   MG 2.2  --      LFT:  Lab Results   Component Value Date    AST 80 (H) 12/03/2024    ALKPHOS 149 12/03/2024    BILITOT 2.5 (H) 12/03/2024     Albumin:   Albumin   Date Value Ref Range Status   12/03/2024 1.1 (L) 3.5 - 5.2 g/dL Final     Protein:   Total Protein   Date Value Ref Range Status   12/03/2024 6.6 6.0 - 8.4 g/dL Final     Lactic acid:   Lab Results   Component Value Date    LACTATE 0.8 12/01/2024    LACTATE 1.0 11/27/2024       Significant Imaging: I have reviewed all pertinent imaging results/findings within the past 24 hours.

## 2024-12-03 NOTE — PLAN OF CARE
Advance Care Planning   [facility] - Medical ICU  Palliative Care       Patient Name: Jose Hernández Sr.  MRN: [id number]  Admission Date: 11/23/2024  Hospital Length of Stay: 10 days  Code Status: Full Code   Attending Provider: Eduardo Garber MD  Palliative Care Provider:   Primary Care Physician: Tong Cantrell MD  Principal Problem:Esophageal perforation     LCSW, along with ILENE SORIANO, visited bedside. Pt intubated/sedated. Remains on vent and pressor support. Bedside RN relays that pt's GCS is improving as they work on weaning his sedation down. No family at bedside. Family still in conversation about how to address next steps in pt's care, as pt's dtrs and son are currently not in agreement. WCTF.     Joann Cochran LCSW-BACS, [facility]-  Department of Palliative Medicine

## 2024-12-03 NOTE — PROGRESS NOTES
[facility] - Medical ICU  Infectious Disease  Progress Note    Patient Name: Jose Hernández Sr.  MRN: [id number]  Admission Date: 11/23/2024  Length of Stay: 10 days  Attending Physician: Eduardo Garber MD  Primary Care Provider: Tong Cantrell MD    Isolation Status: No active isolations    Assessment/Plan:      * Esophageal perforation  Jose Hernández is a 69 year old man with alcohol use disorder, CVA, cdiff (2021) and hypertension [place] esophageal perforation. Found to have pneumoperitoneum/pneumomediastinum on imaging concerning for perforated viscus and loculated pleural effusion s/p robotic lap on 11/10. Had gangrenous thoracic esophagus/hiatal hernia, esophageal ulcer perforation that was repaired with omental patch (op note significant large intraabdominal/mediastinal pus with food stuffs/pills removed from mediastinum, s/p EGD with reported no evidence of intra-esophageal leak, no OR culture sent. Prior hospital course notable for shock, respiratry failure s/p intubation now extubated. Repeat CT c/w esophageal leak. Labs notable for persistent leukocytosis. Blood cultures [place] [facility] [place] are negative. Had esophageal stent placement 11/25 with plan for esophagram on 11/29. Respiratory status worsened requiring intubation on 11/27. Repeat CT chest 11/27 demonstrated that right chest tube is no longer in the R loculated fluid collection and persistence of perigastric fluid collection. Perigastric fluid collection not accessible for drainage, but went for IR drainage of R sided pleural fluid collection. New fevers and worsening respiratory status 12/1, repeat cultures obtained. Overall fever curve seems improved. CXR with slight improvement. Patient is more alert.     Tracheal aspirate growing a GNB (previously Steno) while pleural fluid is growing Candida parapsilosis and Stenotrophomonas.    Recommendations:  - continue meropenem 2g q12 (renal dosing),  micafungin 100 mg q24 hours, and Zyvox  - if no improvement, can consider adding eravacycline for Stenotrophomonas (or Fetroja-Levaquin combo)  - f/u cultures    Ellis Villegas MD  Infectious Disease  [facility] - Medical ICU    Subjective:     Principal Problem:Esophageal perforation    HPI: 68 yo male with alcohol use, CVA, prior cdiff 2021 and HTN admitted for HLOC for esophageal perforation. At OSH, pt was found to have pneumoperitoneum/pneumomediastinum on imaging c/f perforated viscus and loculated pleural effusion s/p robotic lap on 11/10, found to have gangrenous thoracic esophagus/hiatal hernia, esophageal ulcer perforation that was repaired with omental patch (op note significant large intraabdominal/mediastinal pus with food stuff/pills removed from mediastinum, s/p EGD with reported no evidence of intra-esophageal leak. Prior hospital course notable for shock, respiratry failure s/p intubation now extubated. Pt is currently on vancomycin, meropenem, and micafungin. CTS and GI consulted.          Interval History: Notes reviewed. Patient resting comfortably. Opens eyes on command.    Review of Systems   Unable to perform ROS: Intubated     Objective:     Vital Signs (Most Recent):  Temp: 99 °F (37.2 °C) (12/03/24 0701)  Pulse: 72 (12/03/24 1217)  Resp: (!) 43 (12/03/24 1217)  BP: 117/65 (12/02/24 1130)  SpO2: 97 % (12/03/24 1217) Vital Signs (24h Range):  Temp:  [94.4 °F (34.7 °C)-99 °F (37.2 °C)] 99 °F (37.2 °C)  Pulse:  [57-77] 72  Resp:  [23-43] 43  SpO2:  [88 %-100 %] 97 %  Arterial Line BP: ()/(48-76) 128/72     Weight: 77.6 kg (171 lb 1.2 oz)  Body mass index is 27.61 kg/m².    Estimated Creatinine Clearance: 31.1 mL/min (A) (based on SCr of 2.2 mg/dL (H)).     Physical Exam  Vitals and nursing note reviewed.   Constitutional:       General: He is not in acute distress.     Appearance: Normal appearance. He is not toxic-appearing.   HENT:      Head: Normocephalic.   Eyes:      Pupils:  Pupils are equal, round, and reactive to light.   Cardiovascular:      Rate and Rhythm: Normal rate.      Heart sounds: No murmur heard.  Pulmonary:      Breath sounds: No wheezing or rhonchi.   Abdominal:      Tenderness: There is no guarding or rebound.   Skin:     Findings: No erythema.   Neurological:      Mental Status: He is alert.     Chest tubes, drains present.     Significant Labs: BMP:   Recent Labs   Lab 12/03/24  0300 12/03/24  0559 12/03/24  1305      < > 81      < > 139   K 3.4*   < > 3.2*      < > 106   CO2 20*   < > 24   BUN 83*   < > 78*   CREATININE 2.3*   < > 2.2*   CALCIUM 7.8*   < > 7.7*   MG 2.2  --   --     < > = values in this interval not displayed.     CBC:   Recent Labs   Lab 12/02/24  0355 12/02/24  0406 12/02/24  1534 12/03/24  0300 12/03/24  1147   WBC 21.91*  --  20.26* 16.30*  --    HGB 6.3*  --  7.0* 6.8*  --    HCT 19.7*   < > 21.4* 20.4* 23*     --  218 223  --     < > = values in this interval not displayed.     Microbiology Results (last 7 days)       Procedure Component Value Units Date/Time    Culture, Anaerobic [[id number]] Collected: 11/29/24 1539    Order Status: Completed Specimen: Pleural Fluid Updated: 12/03/24 1055     Anaerobic Culture No anaerobes isolated    Culture, Respiratory with Gram Stain [[id number]]  (Abnormal) Collected: 12/01/24 0825    Order Status: Completed Specimen: Respiratory from Tracheal Aspirate Updated: 12/03/24 1050     Respiratory Culture GRAM NEGATIVE BAO  Many  Identification and susceptibility pending       Gram Stain (Respiratory) <10 epithelial cells per low power field.     Gram Stain (Respiratory) No WBC's     Gram Stain (Respiratory) Rare  Gram positive rods    Aerobic culture [[id number]]  (Abnormal) Collected: 11/29/24 1539    Order Status: Completed Specimen: Pleural Fluid Updated: 12/03/24 1003     Aerobic Bacterial Culture PEPPER PARAPSILOSIS  Few        STENOTROPHOMONAS (X.)  MALTOPHILIA  Rare  Susceptibility pending      Blood culture [[id number]] Collected: 12/01/24 1517    Order Status: Completed Specimen: Blood from Peripheral, Upper Arm, Right Updated: 12/02/24 1612     Blood Culture, Routine No Growth to date      No Growth to date    Blood culture [[id number]] Collected: 12/01/24 1518    Order Status: Completed Specimen: Blood from Peripheral, Antecubital, Right Updated: 12/02/24 1612     Blood Culture, Routine No Growth to date      No Growth to date    Fungus culture [[id number]]  (Abnormal) Collected: 11/29/24 1539    Order Status: Completed Specimen: Pleural Fluid Updated: 12/02/24 1403     Fungus (Mycology) Culture CANDIDA PARAPSILOSIS  Few      Gram stain [[id number]] Collected: 11/29/24 1539    Order Status: Completed Specimen: Pleural Fluid Updated: 11/29/24 2310     Gram Stain Result Many WBC's      No organisms seen    Blood culture [[id number]] Collected: 11/24/24 1635    Order Status: Completed Specimen: Blood from Peripheral, Upper Arm, Left Updated: 11/29/24 1812     Blood Culture, Routine No growth after 5 days.    Blood culture [[id number]] Collected: 11/24/24 0840    Order Status: Completed Specimen: Blood Updated: 11/29/24 1012     Blood Culture, Routine No growth after 5 days.    Culture, Respiratory with Gram Stain [[id number]]     Order Status: No result Specimen: Respiratory             Significant Imaging: I have reviewed all pertinent imaging results/findings within the past 24 hours.

## 2024-12-03 NOTE — SUBJECTIVE & OBJECTIVE
Interval History/Significant Events: Patient with two episodes of sudden hypotension (70/40s) and bradycardia (lowest HR 28) what were slow to respond to uptitration of vasopressors. Family notified and at bedside. Shared concerns for continued decline and worsening of clinical status. Family unsure of how they want to proceed with Mr. Hernández's care, remains full code.     Review of Systems   Unable to perform ROS: Intubated     Objective:     Vital Signs (Most Recent):  Temp: 98.6 °F (37 °C) (12/03/24 1101)  Pulse: 76 (12/03/24 1517)  Resp: (!) 36 (12/03/24 1555)  BP: 117/65 (12/02/24 1130)  SpO2: 100 % (12/03/24 1517) Vital Signs (24h Range):  Temp:  [94.4 °F (34.7 °C)-99 °F (37.2 °C)] 98.6 °F (37 °C)  Pulse:  [57-80] 76  Resp:  [23-43] 36  SpO2:  [97 %-100 %] 100 %  Arterial Line BP: ()/(48-72) 122/67   Weight: 77.6 kg (171 lb 1.2 oz)  Body mass index is 27.61 kg/m².      Intake/Output Summary (Last 24 hours) at 12/3/2024 1604  Last data filed at 12/3/2024 1400  Gross per 24 hour   Intake 1947.4 ml   Output 1250 ml   Net 697.4 ml          Physical Exam  Constitutional:       Appearance: He is ill-appearing and toxic-appearing.      Interventions: He is sedated and intubated.   HENT:      Mouth/Throat:      Mouth: Mucous membranes are dry.   Eyes:      Pupils: Pupils are equal, round, and reactive to light.   Cardiovascular:      Rate and Rhythm: Normal rate and regular rhythm.      Pulses: Normal pulses.      Heart sounds: Normal heart sounds.   Pulmonary:      Effort: He is intubated.      Comments: Intubated on mechanical ventilation  Chest tubes x3 to suction.  Abdominal:      General: Bowel sounds are normal.      Palpations: Abdomen is soft.      Comments: KENNY drain x 2   Genitourinary:     Comments: Johansen catheter  Musculoskeletal:         General: Normal range of motion.   Skin:     General: Skin is warm and dry.      Coloration: Skin is pale.            Vents:  Vent Mode: A/C (12/03/24  1517)  Ventilator Initiated: Yes (11/27/24 1522)  Set Rate: 32 BPM (12/03/24 1517)  Vt Set: 385 mL (11/27/24 1522)  PEEP/CPAP: 10 cmH20 (12/03/24 1517)  Oxygen Concentration (%): 70 (12/03/24 1517)  Peak Airway Pressure: 37 cmH20 (12/03/24 1517)  Plateau Pressure: 27 cmH20 (12/03/24 1517)  Total Ve: 10.2 L/m (12/03/24 1517)  F/VT Ratio<105 (RSBI): (!) 96.68 (12/03/24 1517)  Lines/Drains/Airways       Central Venous Catheter Line  Duration             Percutaneous Central Line - Triple Lumen    Internal Jugular Right -- days              Drain  Duration                  Chest Tube   Left Midaxillary -- days         Chest Tube   Right Midaxillary -- days         Closed/Suction Drain   Right Abdomen Bulb -- days         Closed/Suction Drain  Left Abdomen Bulb -- days         Urethral Catheter 11/23/24 1313 Temperature probe 10 days         Chest Tube 11/29/24 1534 Tube - 3 Right Midaxillary;Pleural 14 Fr. 4 days         NG/OG Tube 11/30/24 1100 14 Fr. Left nostril 3 days              Airway  Duration                  Airway - Non-Surgical 11/27/24 1520 Endotracheal Tube 6 days              Arterial Line  Duration             Arterial Line 11/27/24 1810 Left Radial 5 days              Peripheral Intravenous Line  Duration                  Peripheral IV - Single Lumen 12/01/24 0400 20 G Anterior;Left Upper Arm 2 days         Peripheral IV - Single Lumen 12/01/24 1603 18 G 1 3/4 in No Anterior;Right Forearm 2 days                  Significant Labs:    CBC/Anemia Profile:  Recent Labs   Lab 12/02/24  0355 12/02/24  0406 12/02/24  1534 12/03/24  0300 12/03/24  1147   WBC 21.91*  --  20.26* 16.30*  --    HGB 6.3*  --  7.0* 6.8*  --    HCT 19.7*   < > 21.4* 20.4* 23*     --  218 223  --    MCV 82  --  81* 79*  --    RDW 22.7*  --  21.2* 21.0*  --     < > = values in this interval not displayed.        Chemistries:  Recent Labs   Lab 12/02/24  0355 12/02/24  1314 12/02/24  1802 12/02/24  2248 12/03/24  0300  12/03/24  0559 12/03/24  1305   *   < > 136  136 137 140 140  140 139   K 4.2   < > 3.8  3.8 3.7 3.4* 3.6  3.6 3.2*      < > 101  101 103 105 104  104 106   CO2 22*   < > 25  25 22* 20* 21*  21* 24   BUN 76*   < > 80*  80* 82* 83* 83*  83* 78*   CREATININE 2.5*   < > 2.2*  2.2* 2.2* 2.3* 2.3*  2.3* 2.2*   CALCIUM 8.4*   < > 8.1*  8.1* 7.9* 7.8* 7.8*  7.8* 7.7*   ALBUMIN 1.0*  --  1.1*  --  1.1*  --   --    PROT 7.0  --  6.9  --  6.6  --   --    BILITOT 2.2*  --  2.7*  --  2.5*  --   --    ALKPHOS 169*  --  146  --  149  --   --    ALT 23  --  26  --  26  --   --    AST 79*  --  82*  --  80*  --   --    MG 2.2  --  2.3 2.2 2.2  --   --    PHOS 7.5*  --   --  6.8* 6.7*  --   --     < > = values in this interval not displayed.       All pertinent labs within the past 24 hours have been reviewed.    Significant Imaging:  I have reviewed all pertinent imaging results/findings within the past 24 hours.

## 2024-12-03 NOTE — ASSESSMENT & PLAN NOTE
11/10/24: CT abdomen showed evidence of pneumomediastinum, pneumoperitoneum all concerning for perforated viscus.   - Robotic assisted laparoscopy performed. 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 Sicard).   - General surgery performed EGD immediately following laparoscopy. Report no evidence of intra-esophageal leakage. Bilateral KENNY drains placed.    11/23/24: Pt. Transferred to [facility] for higher level of care and AES/CTS consults.    - CTS consulted; appreciate recs. They feel he is too frail to undergo surgical management  - AES consulted; appreciate recs. Esophageal stent placed successfully on 11/25  - Repeat Esophogram completed 11/30 with no obvious large leak  - Continue protonix BID  - Monitor for bleeding; trend CBCs  - Transfuse for hgb <7  - Monitor KENNY drains

## 2024-12-03 NOTE — ASSESSMENT & PLAN NOTE
CT concerning for for multifocal PNA. Covered with Kade/Apple/Vanc.    - ID consulted; appreciate recs  - Continue Kade, Apple, and Linezolid   - Daily CBC  - Wean pressors to maintain MAP > 65  -- GNR in respiratory culture, candida parapsilosis and stenotrophomonas maltophilia in pleural fluid cx  -- Repeat BCx pending

## 2024-12-03 NOTE — ASSESSMENT & PLAN NOTE
Admit creatinine 1.5. Baseline Cr: 0.9.    - Strict I/Os  - Daily CMP  - Avoid nephrotoxic agents  - Renally dose medications  - MAP > 65 and Hgb > 7 goal

## 2024-12-04 LAB
BACTERIA SPEC AEROBE CULT: ABNORMAL
GRAM STN SPEC: ABNORMAL

## 2024-12-04 NOTE — PLAN OF CARE
MICU DAILY GOALS     Family/Goals of care/Code Status   Code Status: DNR    24H Vital Sign Range  Temp:  [94.4 °F (34.7 °C)-99 °F (37.2 °C)]   Pulse:  [57-87]   Resp:  [12-43]   SpO2:  [38 %-100 %]   Arterial Line BP: ()/(48-72)      Shift Events (include procedures and significant events)   Patient transitioned to comfort care    AWAKE RASS: Goal - RASS Goal: -2-->light sedation  Actual - RASS ([place] Agitation-Sedation Scale): restless    Restraint necessity: Not necessary   BREATHE SBT: Not intubated    Coordinate A & B, analgesics/sedatives Pain: uncontrolled   SAT: Not intubated   Delirium CAM-ICU: Overall CAM-ICU: Positive   Early(intubated/ Progressive (non-intubated) Mobility MOVE Screen (INTUBATED ONLY): Not intubated    Activity: Activity Management: Rolling - L1   Feeding/Nutrition Diet order: Diet/Nutrition Received: NPO,     Thrombus DVT prophylaxis: VTE Core Measure: Pharmacological prophylaxis initiated/maintained   HOB Elevation Head of Bed (HOB) Positioning: HOB at 30-45 degrees   Ulcer Prophylaxis GI: no   Glucose control managed Glycemic Management: blood glucose monitored   Skin Skin assessment:     Sacrum intact/not altered? Yes  Heels intact/not altered? Yes  Surgical wound? Yes    CHECK ONE!   (no altered skin or altered skin) and sub boxes:  [] No Altered Skin Integrity Present    []Prevention Measures Documented    [x] Altered Skin Integrity Present or Discovered   [x] LDA present in EPIC, daily doc completed              [] LDA added if not in EPIC (describe wound).                    When describing wound, do not stage, use descriptive words only.    [] Wound Image Taken (required on admit,                   transfer/discharge and every Tuesday)    Wound Care Consulted? No   Bowel Function no issues    Indwelling Catheter Necessity      Urethral Catheter 11/23/24 1313 Temperature probe-Reason for Continuing Urinary Catheterization: Critically ill in ICU and requiring hourly  monitoring of intake/output  [REMOVED]      Urethral Catheter  -Reason for Continuing Urinary Catheterization: Critically ill in ICU and requiring hourly monitoring of intake/output    Percutaneous Central Line - Triple Lumen    Internal Jugular Right-Line Necessity Review: Medication caustic to vasculature     De-escalation Antibiotics No

## 2024-12-04 NOTE — DISCHARGE SUMMARY
Death Note  Critical Care Medicine      Admit Date: 2024    Date of Death: 2024    Time of Death:     Attending Physician: Eduardo Garber MD    Principal Diagnoses: Esophageal perforation    Preliminary Cause of Death: Esophageal perforation    Secondary Diagnoses:   Active Hospital Problems    Diagnosis  POA    *Esophageal perforation [K22.3]  Yes    Palliative care encounter [Z51.5]  Not Applicable    Pain [R52]  Unknown    Goals of care, counseling/discussion [Z71.89]  Not Applicable    Advance care planning [Z71.89]  Not Applicable    Hyperkalemia [E87.5]  No    Poor prognosis [Z78.9]  No    Acute hypoxemic respiratory failure [J96.01]  Yes    Acute systolic heart failure [I50.21]  Yes     Reported EF of 15% at outside facility.  Hemodynamically stable.  Will repeat echo to ensure that this was not sepsis induced.  Sepsis now resolved.       ALFREDO (acute kidney injury) [N17.9]  Yes     Creat 1.5, baseline 0.9.    Hypernatremia:  likely volume depletion with dehydration.  Patient NPO.  Begin fluids.     Adequate UOP, will continue to monitor.        Alcohol use disorder [F10.90]  Yes    Loculated pleural effusion [J90]  Yes    Septic shock [A41.9, R65.21]  Yes    History of CVA (cerebrovascular accident) [Z86.73]  Not Applicable    Essential hypertension [I10]  Yes    Hyperlipidemia [E78.5]  Yes      Resolved Hospital Problems    Diagnosis Date Resolved POA    History of hernia repair [Z98.890, Z87.19] 2024 Not Applicable        Discharged Condition:     HPI:  Mr. Hernández is a 69yoM with a PMHx of alcohol use disorder, chronic back pain 2/2 to spinal stenosis, HLD, HTN, and CVA (on plavix) with no residual deficits who presented to OSH on 11/10/24 with epigastric pain and melena; found to have an esophageal perforation. The patient was initally hypotensive, anemic to 5.7, hyperbilirubinemia, lactic acidosis to 9.9. CTAP showed a pneumoperitoneum and pneumomediastinum consistent  with a perforated viscus, hiatal hernia with surrounding external gas, and loculated pleural effusions. He was transfused and started on octreotide gtt, protonix, ativan, doxy, zosyn, hannah, vanc, precedex, with AC held. For the loculated pleural effusions, bilateral chest tubes were placed. Diagnostic laparoscopy showed hiatal hernia with gangrene in the lower thoracic esophagus and a perforated esophageal ulcer in the lower thoracic area, and mediastinal fluid pus, food, and pills. He underwent surgical repair on 11/10/24. Additionally, he was found to have HFrEF (EF = 20%).The patient was later extubated but then developed AMS. They repeated imaging with extraluminal air adjacent to the GE junction with increased fluid in the lower posterior mediastium concerning for recurrent esophageal perforation.   On 11/23/24, the patient was transferred to [facility] for AES and CTS consults s/t concern for perforated esophagus and loculated pleural effusions    Per chart review from OSH the patient stopped taking his medications a month before presentation because he thought they were making him weak. Other ROS positive for paleness for 3 weeks prior to presentation. The patient provided supplemental history and reported that the patient was not eating or drinking except EtOH, drinks 2-3 bottles of wine and 2-4 beers per day.    Hospital/ICU Course:  Mr. Hernández is a 69yoM with PMHx significant for alcohol use disorder, HLD, HTN, and CVA (on plavix) who presented as a transfer for perforated esophageal ulcer. Bilateral chest tubes and bilateral KENNY drains. CT Chest/Abd/Pelvis: Esophageal leak into the paraesophageal posterior mediastinum and likely bilateral pleural spaces. Multifocal ground-glass opacities concerning for acute infectious or inflammatory process.  Small basilar pleural effusions and mild right lower lobe consolidation. ID, Thoracic Surgery, AES consulted. On 11/25, AES successfully placed an esophageal stent.  Long-term nutrition will need to be addressed. Overnight with increased WOB and intermittent hypoxia. Chest xray with worsening patchy areas of alveolar consolidation; BNP 1484 - responsive to lasix.Intubated 11/27. CT concerning for ARDS. On vasopressors. CT chest and abdomen with contrast completed without evidence of active esophageal leak. Patient now on two pressors with high ventilator requirements. Palliative care consulted for assistance with ongoing C discussions with family.         Consultations were held with the family regarding the patient's expected poor prognosis. At the direction of the family, the patient was extubated and measures to ensure the comfort of the patient including, but not limited to, morphine as needed for pain and air hunger as well as benzodiazepines as needed for agitation. The patient was subsequently declared dead.  Condolences offered.     Ambreen Spear DNP, [facility]-  Critical Care Medicine  12/03/2024 7:45 PM

## 2024-12-04 NOTE — CARE UPDATE
Called to bedside for asystole/respiratory arrest after patient transitioned to comfort care due to poor prognosis.    Death Note:  - GCS 3, patient does not respond to external stimuli  - Heart sounds absent  - Cartoid pulse absent  - Pupils fixed and dilated  - Spontaneous respirations absent    Family at bedside    TOD: 1946 on 12/3/2024  Cause of death: hypoxemic respiratory failure    Bob email: [email or web address]

## 2024-12-05 LAB
BACTERIA SPEC AEROBE CULT: ABNORMAL
BACTERIA SPEC AEROBE CULT: ABNORMAL

## 2024-12-06 LAB
BACTERIA BLD CULT: NORMAL
BACTERIA BLD CULT: NORMAL